# Patient Record
Sex: FEMALE | Race: WHITE | NOT HISPANIC OR LATINO | Employment: FULL TIME | URBAN - METROPOLITAN AREA
[De-identification: names, ages, dates, MRNs, and addresses within clinical notes are randomized per-mention and may not be internally consistent; named-entity substitution may affect disease eponyms.]

---

## 2017-01-26 LAB
C TRACH DNA SPEC QL PROBE+SIG AMP: NORMAL
N GONORRHOEA DNA SPEC QL PROBE+SIG AMP: NORMAL
SPECIMEN DESCRIP: NORMAL
SPECIMEN DESCRIPTION: NORMAL

## 2017-04-13 ENCOUNTER — TRANSFERRED RECORDS (OUTPATIENT)
Dept: HEALTH INFORMATION MANAGEMENT | Facility: CLINIC | Age: 22
End: 2017-04-13

## 2017-04-13 LAB — PAP SMEAR - HIM PATIENT REPORTED: NORMAL

## 2018-09-12 ENCOUNTER — OFFICE VISIT (OUTPATIENT)
Dept: FAMILY MEDICINE | Facility: CLINIC | Age: 23
End: 2018-09-12
Payer: COMMERCIAL

## 2018-09-12 VITALS
WEIGHT: 183 LBS | SYSTOLIC BLOOD PRESSURE: 112 MMHG | BODY MASS INDEX: 30.49 KG/M2 | HEIGHT: 65 IN | TEMPERATURE: 98.4 F | HEART RATE: 72 BPM | OXYGEN SATURATION: 99 % | DIASTOLIC BLOOD PRESSURE: 79 MMHG

## 2018-09-12 DIAGNOSIS — Z30.41 ENCOUNTER FOR SURVEILLANCE OF CONTRACEPTIVE PILLS: Primary | ICD-10-CM

## 2018-09-12 PROCEDURE — 99203 OFFICE O/P NEW LOW 30 MIN: CPT | Performed by: PHYSICIAN ASSISTANT

## 2018-09-12 RX ORDER — NORGESTIMATE AND ETHINYL ESTRADIOL 0.25-0.035
KIT ORAL
COMMUNITY
Start: 2018-01-10 | End: 2018-09-12

## 2018-09-12 RX ORDER — NORGESTIMATE AND ETHINYL ESTRADIOL 0.25-0.035
1 KIT ORAL DAILY
Qty: 84 TABLET | Refills: 1 | Status: SHIPPED | OUTPATIENT
Start: 2018-09-12 | End: 2018-12-11

## 2018-09-12 NOTE — PROGRESS NOTES
"  SUBJECTIVE:                                                    Kendra Clark is a 22 year old female who presents to clinic today for the following health issues:      Medication Followup of Birth Control    Taking Medication as prescribed: yes    Side Effects:  None    Medication Helping Symptoms:  yes    Patient is here for a renewal of her birth control pill.  She states that she takes sprintec and tolerates it well.  She does not have any concerns today regarding the sprintec.     Patient denies any headaches or migraines with auras.  Patient is also a non-smoker.      Problem list and histories reviewed & adjusted, as indicated.  Additional history: as documented      ROS:  Constitutional, HEENT, cardiovascular, pulmonary, GI, , musculoskeletal, neuro, skin, endocrine and psych systems are negative, except as otherwise noted.    OBJECTIVE:                                                    /79 (BP Location: Left arm, Patient Position: Chair, Cuff Size: Adult Large)  Pulse 72  Temp 98.4  F (36.9  C) (Oral)  Ht 5' 4.9\" (1.648 m)  Wt 183 lb (83 kg)  LMP 08/27/2018 (Exact Date)  SpO2 99%  Breastfeeding? No  BMI 30.55 kg/m2  Body mass index is 30.55 kg/(m^2).  GENERAL: healthy, alert and no distress  EYES: Eyes grossly normal to inspection, PERRL and conjunctivae and sclerae normal  RESP: lungs clear to auscultation - no rales, rhonchi or wheezes  BREAST: normal without masses, tenderness or nipple discharge and no palpable axillary masses or adenopathy  CV: regular rate and rhythm, normal S1 S2, no S3 or S4, no murmur, click or rub, no peripheral edema and peripheral pulses strong  MS: no gross musculoskeletal defects noted, no edema  NEURO: Normal strength and tone, mentation intact and speech normal  PSYCH: mentation appears normal, affect normal/bright    Diagnostic Test Results:  none      ASSESSMENT/PLAN:                                                      Kendra was seen today for " "establish care and recheck medication.    Diagnoses and all orders for this visit:    Encounter for surveillance of contraceptive pills  -     norgestimate-ethinyl estradiol (ORTHO-CYCLEN, SPRINTEC) 0.25-35 MG-MCG per tablet; Take 1 tablet by mouth daily    - Patient has had a PAP done at an outside facility.  She reports that it was positive for \"some kind of HPV.\"  She states that on followup PAP, the cells were abnormal, but HPV clear, and therefore she was advised of 3 year followup.   - Patient has been advised to have records faxed to us for review.   - Patient advised to followup in about 3 months for a recheck, and a PAP if needed based on review of medical chart.    - Patient to be seen sooner if needed.     Followup: Return in about 3 months (around 12/12/2018) for Re-check of symptoms, please be seen sooner if needed.    -- I have discussed the patient's diagnosis, and my plan of treatment with the patient and/or family. Patient is aware to followup if symptoms do not improve.  Patient has been advised to be seen sooner or seek more immediate care if symptoms change or worsen.  Patient agrees with and understands the plan today.     See Patient Instructions        Rosalie Chambers PA-C    [facility] [place]    "

## 2018-09-12 NOTE — MR AVS SNAPSHOT
"              After Visit Summary   2018    Kendra Clark    MRN: [id number]           Patient Information     Date Of Birth          1995        Visit Information        Provider Department      2018 9:20 AM Rosalie Chambers PA-C [facility]     Encounter for surveillance of contraceptive pills    -  1       Follow-ups after your visit        Follow-up notes from your care team     Return in about 3 months (around 2018) for Re-check of symptoms, please be seen sooner if needed.      Who to contact     If you have questions or need follow up information about today's clinic visit or your schedule please contact [facility] directly at [phone].  Normal or non-critical lab and imaging results will be communicated to you by MyChart, letter or phone within 4 business days after the clinic has received the results. If you do not hear from us within 7 days, please contact the clinic through PeopleJamhart or phone. If you have a critical or abnormal lab result, we will notify you by phone as soon as possible.  Submit refill requests through TRUSTe or call your pharmacy and they will forward the refill request to us. Please allow 3 business days for your refill to be completed.          Additional Information About Your Visit        MyChart Information     TRUSTe lets you send messages to your doctor, view your test results, renew your prescriptions, schedule appointments and more. To sign up, go to [email or web address] . Click on \"Log in\" on the left side of the screen, which will take you to the Welcome page. Then click on \"Sign up Now\" on the right side of the page.     You will be asked to enter the access code listed below, as well as some personal information. Please follow the directions to create your username and password.     Your access code is: [email or web address]  Expires: 2018 10:04 AM     Your access code will  in 90 " "days. If you need help or a new code, please call your [facility] clinic or [phone].        Care EveryWhere ID     This is your Care EveryWhere ID. This could be used by other organizations to access your [facility] medical records  [id number]        Your Vitals Were     Pulse Temperature Height Last Period Pulse Oximetry Breastfeeding?    72 98.4  F (36.9  C) (Oral) 5' 4.9\" (1.648 m) 08/27/2018 (Exact Date) 99% No    BMI (Body Mass Index)                   30.55 kg/m2            Blood Pressure from Last 3 Encounters:   09/12/18 112/79    Weight from Last 3 Encounters:   09/12/18 183 lb (83 kg)              Today, you had the following     No orders found for display         Today's Medication Changes          These changes are accurate as of 9/12/18 10:04 AM.  If you have any questions, ask your nurse or doctor.               These medicines have changed or have updated prescriptions.        Dose/Directions    norgestimate-ethinyl estradiol 0.25-35 MG-MCG per tablet   Commonly known as:  ORTHO-CYCLEN, SPRINTEC   This may have changed:    - how much to take  - when to take this   Used for:  Encounter for surveillance of contraceptive pills   Changed by:  Rosalie Chambers PA-C        Dose:  1 tablet   Take 1 tablet by mouth daily   Quantity:  84 tablet   Refills:  1            Where to get your medicines      These medications were sent to [facility]  MILENA BURKS  [address] [facility]  [address] [place] [address]     Phone:  [phone]     norgestimate-ethinyl estradiol 0.25-35 MG-MCG per tablet                Primary Care Provider Office Phone # Fax #    Rosalie Chambers PA-C [phone]       [address] [place] [address]        Equal Access to Services     [facility] BOBY AH: Irma malhotra Sokalyn, waaxda luqadaha, qaybta kaalmadewey frank, erin abbott. [facility] [phone].    ATENCIÓN: " Si habla gregg, tiene a serra disposición servicios gratuitos de asistencia lingüística. Josefina peña [phone].    We comply with applicable federal civil rights laws and [place] laws. We do not discriminate on the basis of race, color, national origin, age, disability, sex, sexual orientation, or gender identity.            Thank you!     Thank you for choosing [facility]  for your care. Our goal is always to provide you with excellent care. Hearing back from our patients is one way we can continue to improve our services. Please take a few minutes to complete the written survey that you may receive in the mail after your visit with us. Thank you!             Your Updated Medication List - Protect others around you: Learn how to safely use, store and throw away your medicines at www.disposemymeds.org.          This list is accurate as of 9/12/18 10:04 AM.  Always use your most recent med list.                   Brand Name Dispense Instructions for use Diagnosis    norgestimate-ethinyl estradiol 0.25-35 MG-MCG per tablet    ORTHO-CYCLEN, SPRINTEC    84 tablet    Take 1 tablet by mouth daily    Encounter for surveillance of contraceptive pills

## 2018-09-26 ENCOUNTER — HEALTH MAINTENANCE LETTER (OUTPATIENT)
Age: 23
End: 2018-09-26

## 2018-12-11 ENCOUNTER — OFFICE VISIT (OUTPATIENT)
Dept: FAMILY MEDICINE | Facility: CLINIC | Age: 23
End: 2018-12-11
Payer: COMMERCIAL

## 2018-12-11 VITALS
OXYGEN SATURATION: 97 % | HEART RATE: 110 BPM | SYSTOLIC BLOOD PRESSURE: 122 MMHG | WEIGHT: 186 LBS | TEMPERATURE: 99 F | DIASTOLIC BLOOD PRESSURE: 70 MMHG | BODY MASS INDEX: 30.99 KG/M2 | HEIGHT: 65 IN

## 2018-12-11 DIAGNOSIS — Z11.3 SCREENING EXAMINATION FOR VENEREAL DISEASE: ICD-10-CM

## 2018-12-11 DIAGNOSIS — Z30.41 ENCOUNTER FOR SURVEILLANCE OF CONTRACEPTIVE PILLS: Primary | ICD-10-CM

## 2018-12-11 DIAGNOSIS — Z11.51 SCREENING FOR HUMAN PAPILLOMAVIRUS: ICD-10-CM

## 2018-12-11 DIAGNOSIS — D22.9 MULTIPLE PIGMENTED NEVI: ICD-10-CM

## 2018-12-11 PROCEDURE — 99214 OFFICE O/P EST MOD 30 MIN: CPT | Performed by: PHYSICIAN ASSISTANT

## 2018-12-11 PROCEDURE — 87491 CHLMYD TRACH DNA AMP PROBE: CPT | Performed by: PHYSICIAN ASSISTANT

## 2018-12-11 PROCEDURE — 87591 N.GONORRHOEAE DNA AMP PROB: CPT | Performed by: PHYSICIAN ASSISTANT

## 2018-12-11 PROCEDURE — G0145 SCR C/V CYTO,THINLAYER,RESCR: HCPCS | Performed by: PHYSICIAN ASSISTANT

## 2018-12-11 RX ORDER — NORGESTIMATE AND ETHINYL ESTRADIOL 0.25-0.035
1 KIT ORAL DAILY
Qty: 84 TABLET | Refills: 2 | Status: SHIPPED | OUTPATIENT
Start: 2018-12-11 | End: 2019-08-25

## 2018-12-11 ASSESSMENT — ANXIETY QUESTIONNAIRES
5. BEING SO RESTLESS THAT IT IS HARD TO SIT STILL: NOT AT ALL
GAD7 TOTAL SCORE: 0
2. NOT BEING ABLE TO STOP OR CONTROL WORRYING: NOT AT ALL
6. BECOMING EASILY ANNOYED OR IRRITABLE: NOT AT ALL
3. WORRYING TOO MUCH ABOUT DIFFERENT THINGS: NOT AT ALL
1. FEELING NERVOUS, ANXIOUS, OR ON EDGE: NOT AT ALL
7. FEELING AFRAID AS IF SOMETHING AWFUL MIGHT HAPPEN: NOT AT ALL

## 2018-12-11 ASSESSMENT — MIFFLIN-ST. JEOR: SCORE: 1597.98

## 2018-12-11 ASSESSMENT — PATIENT HEALTH QUESTIONNAIRE - PHQ9
5. POOR APPETITE OR OVEREATING: NOT AT ALL
SUM OF ALL RESPONSES TO PHQ QUESTIONS 1-9: 4

## 2018-12-11 NOTE — PROGRESS NOTES
"  SUBJECTIVE:                                                    Kendra Clark is a 22 year old female who presents to clinic today for the following health issues:      Last pap 01/2016 - did not have pap done at physical done at outside clinic.   Ok with swabbing for G and C -     Patient is here today for a PAP, as she did not have one done at her prior clinic as she thought she did.    Patient does not have concerns today.  She needs a refill of the birth control.  She states that she is doing well on the birth control and denies any concerns or side effects/symptoms.     Also, she has a mole on her left shoulder that has become more itchy and it is raised.  She wants this evaluated at dermatology.  Patient is looking today for a referral.       Problem list and histories reviewed & adjusted, as indicated.  Additional history: as documented      ROS:  Constitutional, HEENT, cardiovascular, pulmonary, GI, , musculoskeletal, neuro, skin, endocrine and psych systems are negative, except as otherwise noted.    OBJECTIVE:                                                    /70 (BP Location: Right arm, Patient Position: Chair, Cuff Size: Adult Large)   Pulse 110   Temp 99  F (37.2  C) (Oral)   Ht 1.648 m (5' 4.9\")   Wt 84.4 kg (186 lb)   LMP 11/19/2018 (Exact Date)   SpO2 97%   Breastfeeding? No   BMI 31.05 kg/m    Body mass index is 31.05 kg/m .  GENERAL: healthy, alert and no distress  EYES: Eyes grossly normal to inspection, PERRL and conjunctivae and sclerae normal   (female): normal female external genitalia, normal urethral meatus, vaginal mucosa, normal cervix/adnexa/uterus without masses or discharge  MS: no gross musculoskeletal defects noted, no edema  NEURO: Normal strength and tone, mentation intact and speech normal  PSYCH: mentation appears normal, affect normal/bright  SKIN:  Multiple pigment nevi on arms bilaterally.      Diagnostic Test Results:  G/C - pending     ASSESSMENT/PLAN:   "                                                    Kendra was seen today for repeat pap smear.    Diagnoses and all orders for this visit:    Encounter for surveillance of contraceptive pills  -     norgestimate-ethinyl estradiol (ORTHO-CYCLEN/SPRINTEC) 0.25-35 MG-MCG tablet; Take 1 tablet by mouth daily    Screening for human papillomavirus  -     Pap imaged thin layer screen only - recommended age 21 - 24 years    Screening examination for venereal disease  -     Neisseria gonorrhoeae PCR  -     Chlamydia trachomatis PCR    Multiple pigmented nevi  -     SKIN CARE REFERRAL      Followup: Return in about 1 year (around 12/11/2019) for Physical Exam, Routine Visit, please be seen sooner if needed.     -- I have discussed the patient's diagnosis, and my plan of treatment with the patient and/or family. Patient is aware to followup if symptoms do not improve.  Patient has been advised to be seen sooner or seek more immediate care if symptoms change or worsen.  Patient agrees with and understands the plan today.     See Patient Instructions        Rosalie Chambers PA-C    [facility] [place]

## 2018-12-11 NOTE — LETTER
[facility]  [address] [place] [address], [place] [address]                  [phone]   December 13, 2018    Kendra Clark  [address] [place] [address] [place] [address]      Dear Kendra,    Here is a summary of your recent test results:    The results from your recent lab work are within normal limits.     -Chlamydia and gonnohrea tests are normal.       Thank you for choosing [facility] for your health care needs,     Your test results are enclosed.      Please contact me if you have any questions.    In addition, here is a list of due or overdue Health Maintenance reminders.    Health Maintenance Due   Topic Date Due     HIV SCREEN (SYSTEM ASSIGNED)  12/13/2012     Chlamydia Screening Lab - yearly  01/26/2018       Please call us at [phone] (or use Tok3n) to address the above recommendations.            Thank you very much for trusting [facility]..     Healthy regards,      Rosalie Chambers PA-C        Results for orders placed or performed in visit on 12/11/18   Neisseria gonorrhoeae PCR   Result Value Ref Range    Specimen Descrip Vagina     N Gonorrhea PCR Negative NEG^Negative   Chlamydia trachomatis PCR   Result Value Ref Range    Specimen Description Vagina     Chlamydia Trachomatis PCR Negative NEG^Negative

## 2018-12-11 NOTE — LETTER
December 19, 2018      Kendra Clark  [address] [facility] [address]    Dear ,      I am happy to inform you that your recent cervical cancer screening test (PAP smear) was normal.      Preventative screenings such as this help to ensure your health for years to come. You should repeat a pap smear in 3 years, unless otherwise directed.      You will still need to return to the clinic every year for your annual exam and other preventive tests.     If you have additional questions regarding this result, please call our registered nurse, Lori at [phone].      Sincerely,      Rosalie Chambers PA-C/ulysses

## 2018-12-12 LAB
C TRACH DNA SPEC QL NAA+PROBE: NEGATIVE
N GONORRHOEA DNA SPEC QL NAA+PROBE: NEGATIVE
SPECIMEN SOURCE: NORMAL
SPECIMEN SOURCE: NORMAL

## 2018-12-12 ASSESSMENT — ASTHMA QUESTIONNAIRES: ACT_TOTALSCORE: 21

## 2018-12-12 ASSESSMENT — ANXIETY QUESTIONNAIRES: GAD7 TOTAL SCORE: 0

## 2018-12-13 LAB
COPATH REPORT: NORMAL
PAP: NORMAL

## 2018-12-13 NOTE — RESULT ENCOUNTER NOTE
Note to staff: Please send a result letter    The results from your recent lab work are within normal limits.    -Chlamydia and gonnohrea tests are normal.      Thank you for choosing [facility] for your health care needs,      Rosalie Chambers PA-C

## 2019-08-25 DIAGNOSIS — Z30.41 ENCOUNTER FOR SURVEILLANCE OF CONTRACEPTIVE PILLS: ICD-10-CM

## 2019-08-26 NOTE — TELEPHONE ENCOUNTER
"Requested Prescriptions   Pending Prescriptions Disp Refills     ESTARYLLA 0.25-35 MG-MCG tablet [Pharmacy Med Name: ESTARYLLA TABLETS 28S]              Last Written Prescription Date:  12.11.18  Last Fill Quantity: 84 tablet,  # refills: 2   Last office visit: 12/11/2018 with prescribing provider:     Future Office Visit:               84 tablet 0     Sig: TAKE 1 TABLET BY MOUTH DAILY       Contraceptives Protocol Passed - 8/25/2019  2:00 PM        Passed - Patient is not a current smoker if age is 35 or older        Passed - Recent (12 mo) or future (30 days) visit within the authorizing provider's specialty     Patient had office visit in the last 12 months or has a visit in the next 30 days with authorizing provider or within the authorizing provider's specialty.  See \"Patient Info\" tab in inbasket, or \"Choose Columns\" in Meds & Orders section of the refill encounter.              Passed - Medication is active on med list        Passed - No active pregnancy on record        Passed - No positive pregnancy test in past 12 months        "

## 2019-08-27 RX ORDER — NORGESTIMATE AND ETHINYL ESTRADIOL 0.25-0.035
KIT ORAL
Qty: 84 TABLET | Refills: 0 | Status: SHIPPED | OUTPATIENT
Start: 2019-08-27 | End: 2019-11-15

## 2019-08-27 NOTE — TELEPHONE ENCOUNTER
Prescription approved per [facility] Refill Protocol.  GASTON GrissomN, RN  [facility]

## 2019-11-15 DIAGNOSIS — Z30.41 ENCOUNTER FOR SURVEILLANCE OF CONTRACEPTIVE PILLS: ICD-10-CM

## 2019-11-15 NOTE — TELEPHONE ENCOUNTER
"Requested Prescriptions   Pending Prescriptions Disp Refills     ESTARYLLA 0.25-35 MG-MCG tablet [Pharmacy Med Name: ESTARYLLA TABLETS 28S]          Last Written Prescription Date:  8.27.19  Last Fill Quantity: 84 tablet,  # refills: 0   Last office visit: 12/11/2018 with prescribing provider:  Navdeep Shahid MD             Future Office Visit:   Next 5 appointments (look out 90 days)    Nov 18, 2019  6:10 PM CST  PHYSICAL with ADALID Prado CNP  [facility] [address] [place] [id number] [phone]            84 tablet 0     Sig: TAKE 1 TABLET BY MOUTH DAILY       Contraceptives Protocol Passed - 11/15/2019  3:05 PM        Passed - Patient is not a current smoker if age is 35 or older        Passed - Recent (12 mo) or future (30 days) visit within the authorizing provider's specialty     Patient has had an office visit with the authorizing provider or a provider within the authorizing providers department within the previous 12 mos or has a future within next 30 days. See \"Patient Info\" tab in inbasket, or \"Choose Columns\" in Meds & Orders section of the refill encounter.              Passed - Medication is active on med list        Passed - No active pregnancy on record        Passed - No positive pregnancy test in past 12 months        "

## 2019-11-18 ENCOUNTER — OFFICE VISIT (OUTPATIENT)
Dept: FAMILY MEDICINE | Facility: CLINIC | Age: 24
End: 2019-11-18
Payer: COMMERCIAL

## 2019-11-18 VITALS
WEIGHT: 203 LBS | TEMPERATURE: 98.3 F | OXYGEN SATURATION: 97 % | BODY MASS INDEX: 33.82 KG/M2 | DIASTOLIC BLOOD PRESSURE: 74 MMHG | HEIGHT: 65 IN | SYSTOLIC BLOOD PRESSURE: 126 MMHG | HEART RATE: 106 BPM

## 2019-11-18 DIAGNOSIS — Z30.41 ENCOUNTER FOR SURVEILLANCE OF CONTRACEPTIVE PILLS: ICD-10-CM

## 2019-11-18 DIAGNOSIS — R53.83 FATIGUE, UNSPECIFIED TYPE: ICD-10-CM

## 2019-11-18 DIAGNOSIS — Z00.00 ROUTINE GENERAL MEDICAL EXAMINATION AT A HEALTH CARE FACILITY: Primary | ICD-10-CM

## 2019-11-18 DIAGNOSIS — Z13.220 SCREENING FOR HYPERLIPIDEMIA: ICD-10-CM

## 2019-11-18 PROCEDURE — 99213 OFFICE O/P EST LOW 20 MIN: CPT | Mod: 25 | Performed by: NURSE PRACTITIONER

## 2019-11-18 PROCEDURE — 99395 PREV VISIT EST AGE 18-39: CPT | Performed by: NURSE PRACTITIONER

## 2019-11-18 RX ORDER — NORGESTIMATE AND ETHINYL ESTRADIOL 0.25-0.035
1 KIT ORAL DAILY
Qty: 84 TABLET | Refills: 3 | Status: SHIPPED | OUTPATIENT
Start: 2019-11-18 | End: 2020-12-03

## 2019-11-18 RX ORDER — NORGESTIMATE AND ETHINYL ESTRADIOL 0.25-0.035
KIT ORAL
Qty: 84 TABLET | Refills: 0 | Status: SHIPPED | OUTPATIENT
Start: 2019-11-18 | End: 2019-11-18

## 2019-11-18 ASSESSMENT — MIFFLIN-ST. JEOR: SCORE: 1670.09

## 2019-11-19 NOTE — PROGRESS NOTES
SUBJECTIVE:   CC: Kendra Clark is an 23 year old woman who presents for preventive health visit.     Healthy Habits:    Do you get at least three servings of calcium containing foods daily (dairy, green leafy vegetables, etc.)? 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 no    Do you see a dentist twice per year? yes    Do you have sleep apnea, excessive snoring or daytime drowsiness?no    Reports fatigue and difficulty losing weight despite extensive exercise.     Today's PHQ-2 Score: 0-0  PHQ-2 ( 1999 [employer]) 11/18/2019 12/11/2018   Q1: Little interest or pleasure in doing things 0 1   Q2: Feeling down, depressed or hopeless 0 0   PHQ-2 Score 0 1       Abuse: Current or Past(Physical, Sexual or Emotional)- No  Do you feel safe in your environment? Yes      Social History     Tobacco Use     Smoking status: Never Smoker     Smokeless tobacco: Never Used   Substance Use Topics     Alcohol use: Yes     Comment: 0-1 drink every 3-4 months     If you drink alcohol do you typically have >3 drinks per day or >7 drinks per week? No                     Reviewed orders with patient.  Reviewed health maintenance and updated orders accordingly - Yes  Lab work is in process  Labs reviewed in EPIC  BP Readings from Last 3 Encounters:   11/18/19 126/74   12/11/18 122/70   09/12/18 112/79    Wt Readings from Last 3 Encounters:   11/18/19 92.1 kg (203 lb)   12/11/18 84.4 kg (186 lb)   09/12/18 83 kg (183 lb)             There is no problem list on file for this patient.    Past Surgical History:   Procedure  "Laterality Date     TONSILLECTOMY & ADENOIDECTOMY Bilateral 01/01/1998       Social History     Tobacco Use     Smoking status: Never Smoker     Smokeless tobacco: Never Used   Substance Use Topics     Alcohol use: Yes     Comment: 0-1 drink every 3-4 months     Family History   Problem Relation Age of Onset     Coronary Artery Disease Early Onset Father      Hypertension Father          Current Outpatient Medications   Medication Sig Dispense Refill     norgestimate-ethinyl estradiol (ESTARYLLA) 0.25-35 MG-MCG tablet Take 1 tablet by mouth daily 84 tablet 3     Allergies   Allergen Reactions     Clindamycin Hives     Mammogram not appropriate for this patient based on age.    Pertinent mammograms are reviewed under the imaging tab.  History of abnormal Pap smear: NO - age 21-29 PAP every 3 years recommended  PAP / HPV 12/11/2018   PAP NIL     Reviewed and updated as needed this visit by clinical staff  Tobacco  Allergies  Meds  Problems  Med Hx  Surg Hx  Fam Hx  Soc Hx          Reviewed and updated as needed this visit by Provider  Tobacco  Allergies  Meds  Problems  Med Hx  Surg Hx  Fam Hx          ROS:  Constitutional, HEENT, cardiovascular, pulmonary, GI, , musculoskeletal, neuro, skin, endocrine and psych systems are negative, except as otherwise noted in the HPI.      OBJECTIVE:   /74 (BP Location: Left arm, Patient Position: Chair, Cuff Size: Adult Large)   Pulse 106   Temp 98.3  F (36.8  C) (Oral)   Ht 1.648 m (5' 4.9\")   Wt 92.1 kg (203 lb)   LMP 10/28/2019 (Exact Date)   SpO2 97%   Breastfeeding No   BMI 33.89 kg/m    EXAM:  GENERAL: healthy, alert and no distress  EYES: Eyes grossly normal to inspection, PERRL and conjunctivae and sclerae normal  HENT: ear canals and TM's normal, nose and mouth without ulcers or lesions-absent tonsils bilateral.   NECK: no adenopathy, no asymmetry, masses, or scars and thyroid normal to palpation  RESP: lungs clear to auscultation - no rales, " rhonchi or wheezes  BREAST: normal without masses, tenderness or nipple discharge and no palpable axillary masses or adenopathy  CV: regular rate and rhythm, normal S1 S2, no S3 or S4, no murmur, click or rub, no peripheral edema and peripheral pulses strong  ABDOMEN: soft, nontender, no hepatosplenomegaly, no masses and bowel sounds normal   (female):declines   MS: no gross musculoskeletal defects noted, no edema  SKIN: no suspicious lesions or rashes  NEURO: Normal strength and tone, mentation intact and speech normal  PSYCH: mentation appears normal, affect normal/bright  LYMPH: no cervical, supraclavicular, axillary, or inguinal adenopathy    Diagnostic Test Results:  Labs reviewed in Epic    ASSESSMENT/PLAN:   Kendra was seen today for physical.    Diagnoses and all orders for this visit:    Routine general medical examination at a [facility] facility  Well woman exam with breast exam completed today.  Fasting labs ordered.  Will notify of lab results.  -     Lipid Profile (Chol, Trig, HDL, LDL calc); Future  -     CBC with platelets and differential; Future  -     Comprehensive metabolic panel (BMP + Alb, Alk Phos, ALT, AST, Total. Bili, TP); Future  -     TSH with free T4 reflex; Future    Fatigue, unspecified type  Will complete labs and notify patient of results.  Continue to monitor.   -     CBC with platelets and differential; Future  -     TSH with free T4 reflex; Future  -     Vitamin D Deficiency; Future  -     OFFICE/OUTPT VISIT,JACQUELINE CASTELLANO III    Encounter for surveillance of contraceptive pills  Stable on current birth control without side effect.   Continue same dose refilled for a year.  -     norgestimate-ethinyl estradiol (ESTARYLLA) 0.25-35 MG-MCG tablet; Take 1 tablet by mouth daily    Screening for hyperlipidemia  Fasting labs pending.  -     Lipid Profile (Chol, Trig, HDL, LDL calc); Future    COUNSELING:   Reviewed preventive health counseling, as reflected in patient instructions        "Regular exercise       Healthy diet/nutrition    Estimated body mass index is 33.89 kg/m  as calculated from the following:    Height as of this encounter: 1.648 m (5' 4.9\").    Weight as of this encounter: 92.1 kg (203 lb).    Weight management plan: Discussed healthy diet and exercise guidelines     reports that she has never smoked. She has never used smokeless tobacco.      Counseling Resources:  ATP IV Guidelines  Pooled Cohorts Equation Calculator  Breast Cancer Risk Calculator  FRAX Risk Assessment  ICSI Preventive Guidelines  Dietary Guidelines for Americans, 2010  USDA's MyPlate  ASA Prophylaxis  Lung CA Screening    ADALID Moreira [facility]  "

## 2019-12-01 ENCOUNTER — OFFICE VISIT (OUTPATIENT)
Dept: URGENT CARE | Facility: URGENT CARE | Age: 24
End: 2019-12-01
Payer: COMMERCIAL

## 2019-12-01 VITALS
BODY MASS INDEX: 34.42 KG/M2 | OXYGEN SATURATION: 100 % | SYSTOLIC BLOOD PRESSURE: 110 MMHG | WEIGHT: 206.2 LBS | TEMPERATURE: 99.1 F | DIASTOLIC BLOOD PRESSURE: 62 MMHG | HEART RATE: 103 BPM

## 2019-12-01 DIAGNOSIS — I88.9 LYMPHADENITIS: ICD-10-CM

## 2019-12-01 DIAGNOSIS — R50.9 FEVER AND CHILLS: ICD-10-CM

## 2019-12-01 DIAGNOSIS — R05.9 COUGH: ICD-10-CM

## 2019-12-01 DIAGNOSIS — R07.0 THROAT PAIN: Primary | ICD-10-CM

## 2019-12-01 LAB
DEPRECATED S PYO AG THROAT QL EIA: NORMAL
FLUAV+FLUBV AG SPEC QL: NEGATIVE
FLUAV+FLUBV AG SPEC QL: NEGATIVE
SPECIMEN SOURCE: NORMAL
SPECIMEN SOURCE: NORMAL

## 2019-12-01 PROCEDURE — 87081 CULTURE SCREEN ONLY: CPT | Performed by: PHYSICIAN ASSISTANT

## 2019-12-01 PROCEDURE — 87880 STREP A ASSAY W/OPTIC: CPT | Performed by: PHYSICIAN ASSISTANT

## 2019-12-01 PROCEDURE — 99214 OFFICE O/P EST MOD 30 MIN: CPT | Performed by: PHYSICIAN ASSISTANT

## 2019-12-01 PROCEDURE — 87804 INFLUENZA ASSAY W/OPTIC: CPT | Performed by: PHYSICIAN ASSISTANT

## 2019-12-01 RX ORDER — AMOXICILLIN 875 MG
875 TABLET ORAL 2 TIMES DAILY
Qty: 20 TABLET | Refills: 0 | Status: SHIPPED | OUTPATIENT
Start: 2019-12-01 | End: 2020-02-18

## 2019-12-01 RX ORDER — IBUPROFEN 600 MG/1
600 TABLET, FILM COATED ORAL EVERY 6 HOURS PRN
Qty: 30 TABLET | Refills: 0 | Status: SHIPPED | OUTPATIENT
Start: 2019-12-01 | End: 2020-02-18

## 2019-12-01 NOTE — PROGRESS NOTES
SUBJECTIVE:  Kendra Clark is a 23 year old female with a chief complaint of sore throat.  Onset of symptoms was 6 day(s) ago.    Course of illness: still present.  Severity moderate  Current and Associated symptoms: sore throat and swollen glands  Treatment measures tried include otc.  Predisposing factors include none.    PMH  Allergies  Obesity    Allergies   Allergen Reactions     Clindamycin Hives     Social History     Tobacco Use     Smoking status: Never Smoker     Smokeless tobacco: Never Used   Substance Use Topics     Alcohol use: Yes     Comment: 0-1 drink every 3-4 months     Family History   Problem Relation Age of Onset     Coronary Artery Disease Early Onset Father      Hypertension Father        ROS:  CONSTITUTIONAL:POSITIVE  for chills and fever   INTEGUMENTARY/SKIN: NEGATIVE for worrisome rashes, moles or lesions  EYES: NEGATIVE for vision changes or irritation  ENT/MOUTH: POSITIVE for throat pain and throat swelling  RESP:NEGATIVE for significant cough or SOB  CV: NEGATIVE for chest pain, palpitations or peripheral edema  GI: NEGATIVE for nausea, abdominal pain, heartburn, or change in bowel habits  : negative for and dysuria  MUSCULOSKELETAL: NEGATIVE for significant arthralgias or myalgia  NEURO: NEGATIVE for weakness, dizziness or paresthesias    OBJECTIVE:   /62   Pulse 103   Temp 99.1  F (37.3  C) (Tympanic)   Wt 206 lb 3.2 oz (93.5 kg)   SpO2 100%   BMI 34.42 kg/m    GENERAL APPEARANCE: healthy, alert and no distress  EYES: EOMI,  PERRL, conjunctiva clear  HENT: TM's normal bilaterally, tonsillar hypertrophy and tonsillar erythema  NECK: cervical adenopathy anterior  RESP: lungs clear to auscultation - no rales, rhonchi or wheezes  CV: regular rates and rhythm, normal S1 S2, no murmur noted  ABDOMEN:  soft, nontender, no HSM or masses and bowel sounds normal  SKIN: no suspicious lesions or rashes    Rapid Strep test is negative; await throat culture  results.    ASSESSMENT/PLAN      ICD-10-CM    1. Throat pain R07.0 Influenza A/B antigen     Rapid strep screen     Beta strep group A culture     ibuprofen (ADVIL/MOTRIN) 600 MG tablet     magic mouthwash (ENTER INGREDIENTS IN COMMENTS) suspension   2. Fever and chills R50.9 Influenza A/B antigen     Rapid strep screen     ibuprofen (ADVIL/MOTRIN) 600 MG tablet   3. Cough R05 Influenza A/B antigen   4. Lymphadenitis I88.9 amoxicillin (AMOXIL) 875 MG tablet        Symptomatic treat with gargles, lozenges, and OTC analgesic as needed. Follow-up with primary clinic if not improving.  Orders Placed This Encounter     ibuprofen (ADVIL/MOTRIN) 600 MG tablet     amoxicillin (AMOXIL) 875 MG tablet     magic mouthwash (ENTER INGREDIENTS IN COMMENTS) suspension     Strep culture pending  Advisement given that patient will be contagious for the next 24-48 hours after antibiotics initiated

## 2019-12-02 LAB
BACTERIA SPEC CULT: NORMAL
SPECIMEN SOURCE: NORMAL

## 2019-12-07 DIAGNOSIS — Z13.220 SCREENING FOR HYPERLIPIDEMIA: ICD-10-CM

## 2019-12-07 DIAGNOSIS — R53.83 FATIGUE, UNSPECIFIED TYPE: ICD-10-CM

## 2019-12-07 DIAGNOSIS — Z00.00 ROUTINE GENERAL MEDICAL EXAMINATION AT A HEALTH CARE FACILITY: ICD-10-CM

## 2019-12-07 LAB
ALBUMIN SERPL-MCNC: 3 G/DL (ref 3.4–5)
ALP SERPL-CCNC: 99 U/L (ref 40–150)
ALT SERPL W P-5'-P-CCNC: 30 U/L (ref 0–50)
ANION GAP SERPL CALCULATED.3IONS-SCNC: 5 MMOL/L (ref 3–14)
AST SERPL W P-5'-P-CCNC: 11 U/L (ref 0–45)
BASOPHILS # BLD AUTO: 0 10E9/L (ref 0–0.2)
BASOPHILS NFR BLD AUTO: 0.3 %
BILIRUB SERPL-MCNC: 0.5 MG/DL (ref 0.2–1.3)
BUN SERPL-MCNC: 11 MG/DL (ref 7–30)
CALCIUM SERPL-MCNC: 8.6 MG/DL (ref 8.5–10.1)
CHLORIDE SERPL-SCNC: 106 MMOL/L (ref 94–109)
CHOLEST SERPL-MCNC: 174 MG/DL
CO2 SERPL-SCNC: 26 MMOL/L (ref 20–32)
CREAT SERPL-MCNC: 0.68 MG/DL (ref 0.52–1.04)
DIFFERENTIAL METHOD BLD: NORMAL
EOSINOPHIL # BLD AUTO: 0.2 10E9/L (ref 0–0.7)
EOSINOPHIL NFR BLD AUTO: 3.1 %
ERYTHROCYTE [DISTWIDTH] IN BLOOD BY AUTOMATED COUNT: 12.1 % (ref 10–15)
GFR SERPL CREATININE-BSD FRML MDRD: >90 ML/MIN/{1.73_M2}
GLUCOSE SERPL-MCNC: 87 MG/DL (ref 70–99)
HCT VFR BLD AUTO: 41.7 % (ref 35–47)
HDLC SERPL-MCNC: 64 MG/DL
HGB BLD-MCNC: 13.8 G/DL (ref 11.7–15.7)
LDLC SERPL CALC-MCNC: 91 MG/DL
LYMPHOCYTES # BLD AUTO: 2.5 10E9/L (ref 0.8–5.3)
LYMPHOCYTES NFR BLD AUTO: 43.3 %
MCH RBC QN AUTO: 28.6 PG (ref 26.5–33)
MCHC RBC AUTO-ENTMCNC: 33.1 G/DL (ref 31.5–36.5)
MCV RBC AUTO: 87 FL (ref 78–100)
MONOCYTES # BLD AUTO: 0.5 10E9/L (ref 0–1.3)
MONOCYTES NFR BLD AUTO: 8.6 %
NEUTROPHILS # BLD AUTO: 2.6 10E9/L (ref 1.6–8.3)
NEUTROPHILS NFR BLD AUTO: 44.7 %
NONHDLC SERPL-MCNC: 110 MG/DL
PLATELET # BLD AUTO: 354 10E9/L (ref 150–450)
POTASSIUM SERPL-SCNC: 3.8 MMOL/L (ref 3.4–5.3)
PROT SERPL-MCNC: 6.8 G/DL (ref 6.8–8.8)
RBC # BLD AUTO: 4.82 10E12/L (ref 3.8–5.2)
SODIUM SERPL-SCNC: 137 MMOL/L (ref 133–144)
TRIGL SERPL-MCNC: 93 MG/DL
TSH SERPL DL<=0.005 MIU/L-ACNC: 2.25 MU/L (ref 0.4–4)
WBC # BLD AUTO: 5.8 10E9/L (ref 4–11)

## 2019-12-07 PROCEDURE — 82306 VITAMIN D 25 HYDROXY: CPT | Performed by: NURSE PRACTITIONER

## 2019-12-07 PROCEDURE — 80061 LIPID PANEL: CPT | Performed by: NURSE PRACTITIONER

## 2019-12-07 PROCEDURE — 36415 COLL VENOUS BLD VENIPUNCTURE: CPT | Performed by: NURSE PRACTITIONER

## 2019-12-07 PROCEDURE — 80050 GENERAL HEALTH PANEL: CPT | Performed by: NURSE PRACTITIONER

## 2019-12-08 LAB — DEPRECATED CALCIDIOL+CALCIFEROL SERPL-MC: 33 UG/L (ref 20–75)

## 2019-12-08 NOTE — RESULT ENCOUNTER NOTE
Dear Kendra,    Here is a summary of your recent test results:    -Normal red blood cell (hgb) levels, normal white blood cell count and normal platelet levels.  -Cholesterol levels (LDL,HDL, Triglycerides) are normal.  ADVISE: rechecking in 1 year.   -Liver and gallbladder tests are normal (ALT,AST, Alk phos, bilirubin), kidney function is normal (Cr, GFR), sodium is normal, potassium is normal, calcium is normal, glucose is normal.  -TSH (thyroid stimulating hormone) level is normal which indicates normal thyroid function.  -Vitamin D level is normal and getting 1000 IU daily in your diet or supplements is recommended.     For additional lab test information, labtestsonline.org is an excellent reference.    In addition, here is a list of due or overdue Health Maintenance reminders:    HPV Vaccine(3 - Female 3-dose series) due on 09/12/2015    Please call us at [phone] (or use [facility]) to address the above recommendations if needed.    Thank you for choosing  [facility] [place].  It was an honor and a privilege to participate in your care.       Healthy regards,    Michaelle Arita, TALON  [facility]

## 2020-02-18 ENCOUNTER — OFFICE VISIT (OUTPATIENT)
Dept: FAMILY MEDICINE | Facility: CLINIC | Age: 25
End: 2020-02-18
Payer: COMMERCIAL

## 2020-02-18 ENCOUNTER — NURSE TRIAGE (OUTPATIENT)
Dept: FAMILY MEDICINE | Facility: CLINIC | Age: 25
End: 2020-02-18

## 2020-02-18 VITALS
BODY MASS INDEX: 36.19 KG/M2 | WEIGHT: 212 LBS | SYSTOLIC BLOOD PRESSURE: 134 MMHG | DIASTOLIC BLOOD PRESSURE: 76 MMHG | HEART RATE: 103 BPM | OXYGEN SATURATION: 98 % | TEMPERATURE: 99 F | HEIGHT: 64 IN

## 2020-02-18 DIAGNOSIS — R03.0 ELEVATED BLOOD PRESSURE READING WITHOUT DIAGNOSIS OF HYPERTENSION: Primary | ICD-10-CM

## 2020-02-18 PROCEDURE — 99212 OFFICE O/P EST SF 10 MIN: CPT | Performed by: FAMILY MEDICINE

## 2020-02-18 ASSESSMENT — MIFFLIN-ST. JEOR: SCORE: 1691.63

## 2020-02-18 NOTE — PROGRESS NOTES
"Subjective     Kendra Clark is a 24 year old female who presents to clinic today for the following health issues:    HPI   Concern - high blood pressure  Onset: x one day    Description:   Blood pressure has been 156/94 is stressed and did one episode of dizziness    Intensity: mild    Progression of Symptoms:  worsening    Accompanying Signs & Symptoms:  See above    Previous history of similar problem:   no    Precipitating factors:   Worsened by:     Alleviating factors:  Improved by:     Therapies Tried and outcome:       There is no problem list on file for this patient.    Past Surgical History:   Procedure Laterality Date     TONSILLECTOMY & ADENOIDECTOMY Bilateral 01/01/1998       Social History     Tobacco Use     Smoking status: Never Smoker     Smokeless tobacco: Never Used   Substance Use Topics     Alcohol use: Yes     Comment: 0-1 drink every 3-4 months     Family History   Problem Relation Age of Onset     Coronary Artery Disease Early Onset Father      Hypertension Father          Current Outpatient Medications   Medication Sig Dispense Refill     norgestimate-ethinyl estradiol (ESTARYLLA) 0.25-35 MG-MCG tablet Take 1 tablet by mouth daily 84 tablet 3     Allergies   Allergen Reactions     Clindamycin Hives         Reviewed and updated as needed this visit by Provider  Allergies         Review of Systems   ROS COMP: CONSTITUTIONAL: NEGATIVE for fever, chills, change in weight  ENT/MOUTH: NEGATIVE for ear, mouth and throat problems  RESP: NEGATIVE for significant cough or SOB  CV: NEGATIVE for chest pain, palpitations or peripheral edema      Objective    /76   Pulse 103   Temp 99  F (37.2  C) (Tympanic)   Ht 1.626 m (5' 4\")   Wt 96.2 kg (212 lb)   LMP 02/03/2020   SpO2 98%   BMI 36.39 kg/m    Body mass index is 36.39 kg/m .  Physical Exam   GENERAL: healthy, alert and no distress  NECK: no adenopathy, no asymmetry, masses, or scars and thyroid normal to palpation  RESP: lungs clear " to auscultation - no rales, rhonchi or wheezes  CV: regular rate and rhythm, normal S1 S2, no S3 or S4, no murmur, click or rub, no peripheral edema and peripheral pulses strong  ABDOMEN: soft, nontender, no hepatosplenomegaly, no masses and bowel sounds normal  MS: no gross musculoskeletal defects noted, no edema            Assessment & Plan     1. Elevated blood pressure reading without diagnosis of hypertension  Normal blood pressure with elevated pulse noted today.  Patient is currently not symptomatic.  Recommending to monitor blood pressure regularly.  Increase hydration.  Eat a low-salt diet.  Sleep hygiene discussed.      Instructed to follow-up with blood pressure is noted to be high again or she has recurrence of symptoms of dizziness.  Patient verbalized understanding and agreement      Liban Givens MD  [facility]

## 2020-08-19 ENCOUNTER — E-VISIT (OUTPATIENT)
Dept: FAMILY MEDICINE | Facility: CLINIC | Age: 25
End: 2020-08-19
Payer: COMMERCIAL

## 2020-08-19 DIAGNOSIS — R10.2 VAGINAL PAIN: Primary | ICD-10-CM

## 2020-08-19 PROCEDURE — 99207 ZZC NON-BILLABLE SERV PER CHARTING: CPT | Performed by: NURSE PRACTITIONER

## 2020-08-20 DIAGNOSIS — R10.2 VAGINAL PAIN: ICD-10-CM

## 2020-08-20 LAB
ALBUMIN UR-MCNC: NEGATIVE MG/DL
APPEARANCE UR: ABNORMAL
BACTERIA #/AREA URNS HPF: ABNORMAL /HPF
BILIRUB UR QL STRIP: NEGATIVE
COLOR UR AUTO: YELLOW
GLUCOSE UR STRIP-MCNC: NEGATIVE MG/DL
HGB UR QL STRIP: ABNORMAL
KETONES UR STRIP-MCNC: NEGATIVE MG/DL
LEUKOCYTE ESTERASE UR QL STRIP: NEGATIVE
NITRATE UR QL: NEGATIVE
NON-SQ EPI CELLS #/AREA URNS LPF: ABNORMAL /LPF
PH UR STRIP: 6 PH (ref 5–7)
RBC #/AREA URNS AUTO: ABNORMAL /HPF
SOURCE: ABNORMAL
SP GR UR STRIP: 1.01 (ref 1–1.03)
SPECIMEN SOURCE: NORMAL
UROBILINOGEN UR STRIP-ACNC: 0.2 EU/DL (ref 0.2–1)
WBC #/AREA URNS AUTO: ABNORMAL /HPF
WET PREP SPEC: NORMAL

## 2020-08-20 PROCEDURE — 81001 URINALYSIS AUTO W/SCOPE: CPT | Performed by: NURSE PRACTITIONER

## 2020-08-20 PROCEDURE — 87210 SMEAR WET MOUNT SALINE/INK: CPT | Performed by: NURSE PRACTITIONER

## 2020-08-20 NOTE — TELEPHONE ENCOUNTER
Provider E-Visit time total (minutes): 5 minutes.      Please call and triage patient. She reports vaginal pain no specific discharge. Does she have any new sexual partners? Need GC/Chlamydia checked?    I will send an order for UACC and wet prep. She can come in and do those today if able please set up a lab only appt.     If labs normal; she will need an in office visit to assess.       Michaelle Arita, ULIP-BC

## 2020-08-20 NOTE — TELEPHONE ENCOUNTER
Called patient @ [phone] -     Patient noted she does NOT have any new sexual partners - is  x 4 years.           Nena Mccabe RN  [facility]

## 2020-08-21 NOTE — RESULT ENCOUNTER NOTE
Dear Kendra,    Here is a summary of your recent test results:    -Urine is normal.  -No signs of bacteria or yeast vaginal infections.    If symptoms continue I encourage you to be seen in clinic for exam so we can accurately diagnose what is going on. For now I would advise loose fitting underwear, ice pack to the area and ibuprofen if you are able to use this.     For additional lab test information, labtestsonline.org is an excellent reference.    In addition, here is a list of due or overdue Health Maintenance reminders:    Diptheria Tetanus Pertussis (DTAP/TDAP/TD) Vaccine(7 - Td) due on 06/11/2020    Please call us at [phone] (or use [facility]) to address the above recommendations if needed.    Thank you for choosing [facility].  It was an honor and a privilege to participate in your care.       Healthy regards,    Michaelle Arita, TALON  [facility]

## 2020-11-30 DIAGNOSIS — Z30.41 ENCOUNTER FOR SURVEILLANCE OF CONTRACEPTIVE PILLS: ICD-10-CM

## 2020-12-01 RX ORDER — NORGESTIMATE AND ETHINYL ESTRADIOL 0.25-0.035
1 KIT ORAL DAILY
Qty: 84 TABLET | Refills: 3 | Status: CANCELLED | OUTPATIENT
Start: 2020-12-01

## 2020-12-01 NOTE — TELEPHONE ENCOUNTER
Pt due for wellness, LOV11/18/2019    Attempt#1  [employer] message sent.    Rich LINDER RN   [facility]

## 2020-12-30 ENCOUNTER — MYC MEDICAL ADVICE (OUTPATIENT)
Dept: FAMILY MEDICINE | Facility: CLINIC | Age: 25
End: 2020-12-30

## 2020-12-30 NOTE — TELEPHONE ENCOUNTER
Health Maintenance Due   Topic Date Due     ANNUAL REVIEW OF HM ORDERS  1994     HEPATITIS C SCREENING  12/13/2012     DTAP/TDAP/TD IMMUNIZATION (7 - Td) 06/11/2020     INFLUENZA VACCINE (1) 09/01/2020     PREVENTIVE CARE VISIT  11/18/2020     Current Outpatient Medications   Medication     norgestimate-ethinyl estradiol (ESTARYLLA) 0.25-35 MG-MCG tablet     No current facility-administered medications for this visit.      LOV: 2/18/2020    Last PAP: 12/11/2018  Please send patient normal pap letter ([id number]). Pap due in 3 years.    Patient is UTD    Routing to PCP for further review/recommendations/orders.  Anything else you recommend?      Nena Mccabe RN  [facility]

## 2020-12-30 NOTE — TELEPHONE ENCOUNTER
Please call patient.     Routine wellness preventative care recommended yearly even if on no medications.     She can schedule this at her convenience if she would like.      Michaelle Arita, FNP-BC

## 2021-01-03 ENCOUNTER — HEALTH MAINTENANCE LETTER (OUTPATIENT)
Age: 26
End: 2021-01-03

## 2021-01-26 ENCOUNTER — PRENATAL OFFICE VISIT (OUTPATIENT)
Dept: NURSING | Facility: CLINIC | Age: 26
End: 2021-01-26
Payer: COMMERCIAL

## 2021-01-26 DIAGNOSIS — O36.80X0 PREGNANCY WITH INCONCLUSIVE FETAL VIABILITY: Primary | ICD-10-CM

## 2021-01-26 DIAGNOSIS — Z34.01 ENCOUNTER FOR SUPERVISION OF NORMAL FIRST PREGNANCY IN FIRST TRIMESTER: ICD-10-CM

## 2021-01-26 PROBLEM — Z34.00 SUPERVISION OF NORMAL IUP (INTRAUTERINE PREGNANCY) IN PRIMIGRAVIDA: Status: ACTIVE | Noted: 2021-01-26

## 2021-01-26 PROCEDURE — 99207 PR NO CHARGE NURSE ONLY: CPT

## 2021-01-26 NOTE — PROGRESS NOTES
SUBJECTIVE:     HPI:    This is a 25 year old female patient,  who presents for her first obstetrical visit.    TAHMINA: 2021, by Last Menstrual Period.  She is 8w1d weeks at time of nurse visit 21.  Her cycles are regular.  Her last menstrual period was normal.   Since her LMP, she has experienced  nausea, headache, constipation and breast tenderness).   She denies emesis, abdominal pain, fatigue, loss of appetite, vaginal discharge, dysuria, pelvic pain, urinary urgency, lightheadedness, urinary frequency, vaginal bleeding and hemorrhoids.    Additional History: -BMI 35    Have you travelled during the pregnancy?No  Have your sexual partner(s) travelled during the pregnancy?No      HISTORY:   Planned Pregnancy: Yes  Marital Status:  - Juan J  Occupation: RN at [employer]  Living in Household: Spouse and fur babies    Past History:  Her past medical history History reviewed. No pertinent past medical history..      She has a history of  First Pregnancy    Since her last LMP she denies use of alcohol, tobacco and street drugs.    Past medical, surgical, social and family history were reviewed and updated in [facility].      Current Outpatient Medications   Medication     Prenatal Vit-Fe Fumarate-FA (PRENATAL VITAMIN PO)     No current facility-administered medications for this visit.        ROS:   12 point review of systems negative other than symptoms noted below or in the HPI.  Breast: Tenderness  Gastrointestinal: Constipation and Nausea  Neurologic: Headaches    Nurse phone visit completed. Prenatal book and folder (containing standard educational hand-outs and brochures) will be given at first visit to patient. Information in folder reviewed over the phone. Questions answered. Brochure given on optional screening available to assess chromosomal anomalies. Pt will discuss at first visit NIPT. Pt advised to call the clinic if she has any questions or concerns related to her pregnancy.  Prenatal labs future ordered. New prenatal visit scheduled on 2/10/21 with MARCIA Ybarra CNM.      Lab Results   Component Value Date    PAP NIL 12/11/2018     PHQ-9 score:    PHQ 1/25/2021   PHQ-9 Total Score 1   Q9: Thoughts of better off dead/self-harm past 2 weeks Not at all       KRYS-7 SCORE 12/11/2018 1/25/2021   Total Score - 4 (minimal anxiety)   Total Score 0 4

## 2021-02-10 ENCOUNTER — ANCILLARY PROCEDURE (OUTPATIENT)
Dept: ULTRASOUND IMAGING | Facility: CLINIC | Age: 26
End: 2021-02-10
Payer: COMMERCIAL

## 2021-02-10 DIAGNOSIS — O36.80X0 PREGNANCY WITH INCONCLUSIVE FETAL VIABILITY: ICD-10-CM

## 2021-02-10 PROCEDURE — 76817 TRANSVAGINAL US OBSTETRIC: CPT | Performed by: OBSTETRICS & GYNECOLOGY

## 2021-02-11 NOTE — RESULT ENCOUNTER NOTE
Yury Gardner,    Your ultrasound results show that everything is consistent in your pregnancy. Your due date is 9/6/21! No extra follow up is needed. We can't wait to see you for your new ob appointment. Please contact us with any questions or concerns.     Thank you,  Garrett Loza, KATHY, APRN, CNM

## 2021-02-13 PROBLEM — O99.210 OBESITY IN PREGNANCY: Status: ACTIVE | Noted: 2021-02-13

## 2021-02-13 NOTE — PATIENT INSTRUCTIONS
Thank you for coming to see the Midwives at the   [facility]!      Please take prenatal vitamin with DHA and vitamin D3 4,000-5,000 international unit(s) once daily during the entire pregnancy.      We will notify you about your labs that were drawn today once we get the results back.  If you have MyChart your lab results will be posted there.      Someone from the clinic will call you personally or send you a [employer] message with your results.      If you need any refills of medications please call your pharmacy and they will contact us.      If you have a medical emergency please call 911.      If you have any concerns about today's visit, wish to schedule another appointment, or have an urgent medical concern please call our office at [phone]. You can also make appointments through [employer].      After hours you may also call the clinic number above to be connected with [facility]'s after hours triage nurse.  The nurse can page the midwife on call if needed. There is always a midwife on call 24 hours a day.    Prenatal Care Recommendations:    Before 14 weeks: Dating ultrasound, genetic testing       This ultrasound helps us determine your dates accurately. Innatal (genetic screening test) can be drawn anytime after 10 weeks of gestation.    16 weeks: Optional genetic testing single AFP       This testing helps understand your baby's risk for some genetic abnormalities.    18-22 weeks:  Screening anatomy ultrasound       This testing will look for early growth abnormalities, placenta location, and may tell the baby's gender if you wish to find out.    24-27 weeks: One hour diabetes test (GCT) and complete blood count       This test helps identify diabetes of pregnancy or gestational diabetes.  We also look   at the iron in your blood and how well your blood clots.    28 - 36 weeks: Tetanus shot (Tdap)       This shot helps protect you and your baby from whooping cough.    36 weeks and  later: Group B Strep test (GBS)       This test helps predict if you need antibiotics in labor to prevent infection for your baby.    Anytime September to April:  Flu shot       This shot helps protect you and your family from the flu.  This is especially important during pregnancy.        The typical schedule after your first visit today you can expect:     Visit 2 - 12-16 weeks  Visit 3 - 20 weeks  Visit 4 - 24 weeks  Visit 5 - 28 weeks  Visit 6 - 30 weeks  Visit 7 - 32 weeks  Visit 8 - 34 weeks  Visit 9 - 36 weeks  Weekly after 36 weeks until delivery.        Any time during or after your pregnancy you may experience increased depression and/or mood changes.    We are here to support you. Please contact us if you are:    Feeling anxious    Overwhelmed or sad     Trouble sleeping    Crying uncontrollably    Trouble caring for yourself or baby.    Any thoughts of hurting yourself, your baby, or anyone else    If anything comes up between your visits or you have concerns please don't hesitate to contact us.    Secure access to your medical record:  Use [employer] (secure email communication and access to your chart) to send your primary care provider a message or make an appointment. Ask someone on your Team how to sign up for [employer]. To log on to [employer] or for more information in [employer] please visit the website at [email or web address].       Certified Nurse Midwife (CNM) Team    BARB Palmer CNM Melissa Kitzman APRN, CNM, [facility]-BC  ADALID Condon DNP, ADALID Gonsalves DNP, BARB      Again, thank you for choosing the midwives at [facility].  We are excited to be a part of your pregnancy. Please let us know how we can best partner with you to improve your and your family's health.    Remedies for nausea and vomiting with pregnancy      Eat small frequent meals every 2-3 hours if possible.       Avoid food at extremes of temperature and  drinks with carbonation.      Eat foods that appeal to you, avoiding fats and spicy foods.      Avoid liquids with foods.  Drink liquids 30-60 minutes before or after eating and sip slowly.      Bread, pasta, crackers, potatoes, and rice tend to be tolerated the best.      Don't worry about what you eat in the first 3 months, it is more important that you can eat and keep it down.       Try flat ginger ale or ginger tea      Before rising in the morning, eat a small amount of crackers or dry toast.      Peppermint tea      Ginger is a herbal remedy for nausea and you can use it in any form.  There are ginger tablets you can purchase.  The dose 1000 mg a day in divided doses.       You may also try doxylamine (Unisom) 12.5 mg three times a day which is a sleeping medication along with Vitamin B6 25 mg three times a day.  This combination takes up to a week to work so give it some time.       Benadryl (diphenhydramine) 25-50 mg every 8 hour or Dramamine (dimenhydrinate)  mg by mouth every 4-6 hours. Both of these medication may cause some drowsiness      Other things that may help include an Accupressure band and acupuncture      If these methods fail there are many prescriptions that we can try    If you begin to vomit more than 5 or 6 times a day and feel that you are unable to keep anything down, call the [facility] at [phone]    Recommendations for total and rate of weight gain during pregnancy, by prepregnancy BMI    Total weight gain Rates of weight gain*  2nd and 3rd trimester   Prepregnancy BMI Range in kg Range in lbs Mean (range) in kg/week Mean (range) in lbs/week   Underweight (<18.5 kg/m2) 12.5-18 28-40 0.51 (0.44-0.58) 1 (1-1.3)   Normal weight (18.5-24.9 kg/m2) 11.5-16 25-35 0.42 (0.35-0.50) 1 (0.8-1)   Overweight (25.0-29.9 kg/m2) 7-11.5 15-25 0.28 (0.23-0.33) 0.6 (0.5-0.7)   Obese (?30.0 kg/m2) 5-9 11-20 0.22 (0.17-0.27) 0.5 (0.4-0.6)   BMI: body mass index.  *  Calculations assume a 0.5-2 kg (1.1-4.4 lbs) weight gain in the first trimester.  * To calculate BMI go to [email or web address]/[email or web address]/        Constipation    Constipation can be caused by many factors such as poor diet, lack of activity, and medications. Often times women experience more constipation during pregnancy due to decreased intestinal motility.    DRINK TONS OF WATER! 2.5 liters (4 pints) of water per day      Activity is very important. Increase your daily activity to at least 20-60 minutes. This increases blood flow to your gut and improves bowel function    Limit caffeine and alcohol intake    Avoid foods you've identified as constipating    Increase fiber intake: there are ways to increase you fiber through your diet but there are also OTC agents such as Metamucil or Benfiber that you can supplement your diet with    Use probiotics such as Florastor and/or prebiotics such as oligosaccharides    Consider using an Omega 3 supplement, flaxseed and melva seeds are great sources    Plan adequate time for elimination, it is most effective right after activity, and it may be beneficial to plan a consistent time daily    Food Suggestions      Eat beans, nuts, whole grain breads and cereals, oats, barley, figs, apples with skin, raisins, green leafy vegetables, fresh and dried fruits (especially grapes), prunes or prune juice    Eat popcorn nightly    Eat 2-3 salads per day    Add a pinch of cayenne pepper to food    1-2 tbsp of olive oil per day    Lemon juice and/or honey in warm water every morning before breakfast    Decrease red meat, refined white flour products like white rice, white bread and pasta    Tea infusions of: rosemary, chamomile, lemon balm, senna leaf, alfalfa, fennel seeds, lavender, cascara, dandelion, licorice root tea, psyllium seeds, marshmallow root    Other remedies      Increase Vitamin B and E (800 IU if not pregnant, 400 IU if pregnant per day) and potassium, calcium, and magnesium  supplements      If these remedies fail, medications are an option as well. Please talk with your midwife if you continue to struggle with constipation. If you are pregnant please double check with us before starting any medications!

## 2021-02-15 ENCOUNTER — PRENATAL OFFICE VISIT (OUTPATIENT)
Dept: MIDWIFE SERVICES | Facility: CLINIC | Age: 26
End: 2021-02-15
Payer: COMMERCIAL

## 2021-02-15 VITALS — WEIGHT: 214 LBS | BODY MASS INDEX: 36.73 KG/M2 | SYSTOLIC BLOOD PRESSURE: 102 MMHG | DIASTOLIC BLOOD PRESSURE: 66 MMHG

## 2021-02-15 DIAGNOSIS — O09.91 HIGH-RISK PREGNANCY IN FIRST TRIMESTER: Primary | ICD-10-CM

## 2021-02-15 DIAGNOSIS — O99.210 OBESITY IN PREGNANCY: ICD-10-CM

## 2021-02-15 DIAGNOSIS — Z3A.11 11 WEEKS GESTATION OF PREGNANCY: ICD-10-CM

## 2021-02-15 PROBLEM — O09.90 PREGNANCY, SUPERVISION, HIGH-RISK: Status: ACTIVE | Noted: 2021-01-26

## 2021-02-15 LAB
ALBUMIN UR-MCNC: NEGATIVE MG/DL
APPEARANCE UR: CLEAR
BILIRUB UR QL STRIP: NEGATIVE
COLOR UR AUTO: YELLOW
ERYTHROCYTE [DISTWIDTH] IN BLOOD BY AUTOMATED COUNT: 11.7 % (ref 10–15)
GLUCOSE UR STRIP-MCNC: NEGATIVE MG/DL
HBA1C MFR BLD: 4.7 % (ref 0–5.6)
HCT VFR BLD AUTO: 39.2 % (ref 35–47)
HGB BLD-MCNC: 13.3 G/DL (ref 11.7–15.7)
HGB UR QL STRIP: NEGATIVE
KETONES UR STRIP-MCNC: NEGATIVE MG/DL
LEUKOCYTE ESTERASE UR QL STRIP: NEGATIVE
MCH RBC QN AUTO: 30 PG (ref 26.5–33)
MCHC RBC AUTO-ENTMCNC: 33.9 G/DL (ref 31.5–36.5)
MCV RBC AUTO: 88 FL (ref 78–100)
NITRATE UR QL: NEGATIVE
PH UR STRIP: 5.5 PH (ref 5–7)
PLATELET # BLD AUTO: 288 10E9/L (ref 150–450)
RBC # BLD AUTO: 4.44 10E12/L (ref 3.8–5.2)
SOURCE: NORMAL
SP GR UR STRIP: 1.02 (ref 1–1.03)
UROBILINOGEN UR STRIP-ACNC: 0.2 EU/DL (ref 0.2–1)
WBC # BLD AUTO: 9.8 10E9/L (ref 4–11)

## 2021-02-15 PROCEDURE — 86762 RUBELLA ANTIBODY: CPT | Performed by: ADVANCED PRACTICE MIDWIFE

## 2021-02-15 PROCEDURE — 87086 URINE CULTURE/COLONY COUNT: CPT | Performed by: ADVANCED PRACTICE MIDWIFE

## 2021-02-15 PROCEDURE — 85027 COMPLETE CBC AUTOMATED: CPT | Performed by: ADVANCED PRACTICE MIDWIFE

## 2021-02-15 PROCEDURE — 86901 BLOOD TYPING SEROLOGIC RH(D): CPT | Performed by: ADVANCED PRACTICE MIDWIFE

## 2021-02-15 PROCEDURE — 83036 HEMOGLOBIN GLYCOSYLATED A1C: CPT | Performed by: ADVANCED PRACTICE MIDWIFE

## 2021-02-15 PROCEDURE — 87389 HIV-1 AG W/HIV-1&-2 AB AG IA: CPT | Performed by: ADVANCED PRACTICE MIDWIFE

## 2021-02-15 PROCEDURE — 99000 SPECIMEN HANDLING OFFICE-LAB: CPT | Performed by: ADVANCED PRACTICE MIDWIFE

## 2021-02-15 PROCEDURE — 86780 TREPONEMA PALLIDUM: CPT | Mod: 90 | Performed by: ADVANCED PRACTICE MIDWIFE

## 2021-02-15 PROCEDURE — 99207 PR FIRST OB VISIT: CPT | Performed by: ADVANCED PRACTICE MIDWIFE

## 2021-02-15 PROCEDURE — 81003 URINALYSIS AUTO W/O SCOPE: CPT | Performed by: ADVANCED PRACTICE MIDWIFE

## 2021-02-15 PROCEDURE — 86850 RBC ANTIBODY SCREEN: CPT | Performed by: ADVANCED PRACTICE MIDWIFE

## 2021-02-15 PROCEDURE — 87340 HEPATITIS B SURFACE AG IA: CPT | Performed by: ADVANCED PRACTICE MIDWIFE

## 2021-02-15 PROCEDURE — 86900 BLOOD TYPING SEROLOGIC ABO: CPT | Performed by: ADVANCED PRACTICE MIDWIFE

## 2021-02-15 PROCEDURE — 36415 COLL VENOUS BLD VENIPUNCTURE: CPT | Performed by: ADVANCED PRACTICE MIDWIFE

## 2021-02-16 ENCOUNTER — TRANSCRIBE ORDERS (OUTPATIENT)
Dept: MATERNAL FETAL MEDICINE | Facility: CLINIC | Age: 26
End: 2021-02-16

## 2021-02-16 DIAGNOSIS — O26.90 PREGNANCY RELATED CONDITION, ANTEPARTUM: Primary | ICD-10-CM

## 2021-02-16 LAB
ABO + RH BLD: NORMAL
ABO + RH BLD: NORMAL
BLD GP AB SCN SERPL QL: NORMAL
BLOOD BANK CMNT PATIENT-IMP: NORMAL
SPECIMEN EXP DATE BLD: NORMAL
T PALLIDUM AB SER QL: NONREACTIVE

## 2021-02-17 LAB
BACTERIA SPEC CULT: NORMAL
HBV SURFACE AG SERPL QL IA: NONREACTIVE
HIV 1+2 AB+HIV1 P24 AG SERPL QL IA: NONREACTIVE
Lab: NORMAL
RUBV IGG SERPL IA-ACNC: 19 IU/ML
SPECIMEN SOURCE: NORMAL

## 2021-03-10 NOTE — PROGRESS NOTES
Here with Juan J today, both anxious and excited to hear baby's heart beat since they did not get to at the last visit. Feeling well and not nauseous. Noting some R sided pain with abrupt movements or when repositioning in bed at night. Discussed that this is most likely round ligament pain and recommended heat/pressure for relief.  Fetal movement: unsure, reports round ligament pain but no clear movements, lots of fetal movement during auscultation today that Kendra was not able to feel directly.  Discussed that it is normal to feel movement between 18-22 weeks.  Denies loss of fluid/vb/contractions/pelvic pain.  Requested AFP and NIPT today, Juan J is hoping to learn sex ASAP to start thinking of names.  Declined GC/Chlamydia urine collection today.  Level II scheduled at [facility] 4/13  Return to clinic 4 weeks    JUAN Houser    [facility]    I, Isabel Ramos, am serving as a scribe; to document services personally performed by Georgette CORDOBA CNM- based on data collection and the provider's statements to me.    Provider Disclosure:  I agree with above History, Review of Systems, Physical exam and Plan. I have reviewed the content of the documentation and have edited it as needed. I have personally performed the services documented here and the documentation accurately represents those services and the decisions I have made.    ADALID Alvarez CNM

## 2021-03-15 ENCOUNTER — PRENATAL OFFICE VISIT (OUTPATIENT)
Dept: MIDWIFE SERVICES | Facility: CLINIC | Age: 26
End: 2021-03-15
Payer: COMMERCIAL

## 2021-03-15 VITALS — SYSTOLIC BLOOD PRESSURE: 132 MMHG | DIASTOLIC BLOOD PRESSURE: 76 MMHG | BODY MASS INDEX: 36.9 KG/M2 | WEIGHT: 215 LBS

## 2021-03-15 DIAGNOSIS — O09.92 SUPERVISION OF HIGH RISK PREGNANCY IN SECOND TRIMESTER: Primary | ICD-10-CM

## 2021-03-15 DIAGNOSIS — Z3A.15 15 WEEKS GESTATION OF PREGNANCY: ICD-10-CM

## 2021-03-15 DIAGNOSIS — Z13.79 GENETIC SCREENING: ICD-10-CM

## 2021-03-15 DIAGNOSIS — O99.210 OBESITY IN PREGNANCY: ICD-10-CM

## 2021-03-15 PROCEDURE — 99207 PR PRENATAL VISIT: CPT | Performed by: ADVANCED PRACTICE MIDWIFE

## 2021-03-15 PROCEDURE — 99000 SPECIMEN HANDLING OFFICE-LAB: CPT | Performed by: ADVANCED PRACTICE MIDWIFE

## 2021-03-15 PROCEDURE — 99N1100 PR STATISTIC VERIFI PRENATAL TRISOMY 21,18,13: Mod: 90 | Performed by: ADVANCED PRACTICE MIDWIFE

## 2021-03-15 PROCEDURE — 82105 ALPHA-FETOPROTEIN SERUM: CPT | Mod: 90 | Performed by: ADVANCED PRACTICE MIDWIFE

## 2021-03-15 PROCEDURE — 36415 COLL VENOUS BLD VENIPUNCTURE: CPT | Performed by: ADVANCED PRACTICE MIDWIFE

## 2021-03-18 LAB
# FETUSES US: NORMAL
# FETUSES: 1
AFP ADJ MOM AMN: 1.47
AFP SERPL-MCNC: 32 NG/ML
AGE - REPORTED: 26.7 YR
CURRENT SMOKER: NO
CURRENT SMOKER: NO
DIABETES STATUS PATIENT: NO
EGG DONOR AGE: NORMAL
FAMILY MEMBER DISEASES HX: NO
FAMILY MEMBER DISEASES HX: NO
GA METHOD: NORMAL
GA METHOD: NORMAL
GA: NORMAL WK
IDDM PATIENT QL: NO
INTEGRATED SCN PATIENT-IMP: NORMAL
IVF PREGNANCY: NO
LMP START DATE: NORMAL
MONOCHORIONIC TWINS: NO
SERVICE CMNT-IMP: NO
SPECIMEN DRAWN SERPL: NORMAL
VALPROIC/CARBAMAZEPINE STATUS: NO
WEIGHT UNITS: NORMAL

## 2021-03-22 ENCOUNTER — TELEPHONE (OUTPATIENT)
Dept: MIDWIFE SERVICES | Facility: CLINIC | Age: 26
End: 2021-03-22

## 2021-03-22 LAB — LAB SCANNED RESULT: NORMAL

## 2021-03-22 NOTE — TELEPHONE ENCOUNTER
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    Testing done:  Innatal Prenatal Screen    Action:  Spoke to patient and gave NORMAL results and routed to provider.     Gender:  Gender NOT revealed per patient's request.     Pt verbalized understanding, in agreement with plan, and voiced no further questions.    Mely Cazares RN

## 2021-04-06 ENCOUNTER — PRE VISIT (OUTPATIENT)
Dept: MATERNAL FETAL MEDICINE | Facility: CLINIC | Age: 26
End: 2021-04-06

## 2021-04-12 NOTE — PATIENT INSTRUCTIONS
Round Ligament Pain    Pregnancy can entail many normal discomforts. One of those discomforts may be round ligament pain. Round ligament pain occurs as your uterus and your baby grow and the muscles begin to stretch more in your abdomen. Round ligament pain is normal and not dangerous but can be very uncomfortable      Round ligament pain is typically described as an unpleasant sensation that ranges from a sharp knifelike pain to dull intermittent pain in the lower abdominal/suprapubic area of a pregnant woman      Virtually all pregnant women will experience this pain at some point during their pregnancies      It typically manifests between 16 and 20 weeks of pregnancy and can be incredibly bothersome especially for women who remain very active during their pregnancies       Please discuss with your midwife if you are having this type of pain; sometimes the pain can be associated with other medical conditions so it is important for us to assess you just to make sure    Comfort measures    There are certain things you can do to cope with round ligament pain and ease the discomfort you are having      Pregnancy support belt      Tylenol      Hot/ice packs      Baths       Exercise such as yoga and swimming that help stretch muscles      Prenatal massage      Reflexology to waist and pelvic joints       Positioning such as side-lying and hands and knees, make sure your abdomen is  well supported with pillows while doing different positions      Calcium Rich Foods    All premenopausal or women of child-bearing age need to get at least 1000 mg of calcium per day from diet and/or supplementation. Post menopausal women should get at least 1200 mg from diet and/or supplementation.    Food     Amount   Calcium (mg)  Dairy  Yogurt     1 cup   400   Ice Cream/Frozen yogurt 1/2 cup  100  Milk 1% or 2%   1 cup   300  Cheese   1 oz    195-335   Cottage cheese 2%  1 cup   155  Fruits  Orange   1 medium  60  Pear    1  medium  19  Raisins    1/4 cup  18  Vegetables-fresh and/or cooked  Broccoli   1/2 cup  36  Bok Kalpana   1/2 cup  79  Rojas greens   1/2 cup  15  Carrots    1 medium  19  Iceberg lettuce  4 leaves  16  Nuts, Beans, Seeds  Canned baked beans   1/2 cup  100  Canned red kidney beans 1/2 cup  25-80  Canned navy beans  1/2 cup  64  Tofu with calcium sulfate  1/2 cup  150  Soybeans   1 cup   175  Soy milk    1 cup   10  Almonds    1/4 cup  74  Protein  [place]   3 oz   181  Tuna, canned   3 oz   10  Turkey breast   3.5 oz   10  Egg (large)   1 egg   27  Peanut Butter   2 tbsp   11  Beef     3 oz   9  Chicken   1 leg   15  Grain  Amaranth (uncooked)  1 cup   298  Corn tortilla    1 medium  42  Rice (cooked)   1/2 cup  20  Waffle (frozen ~7in)  1   179  Bagel    1   23  Calcium fortified foods/drinks  Orange juice   1 cup   300  Cereal + milk   3/4 cup + 1/2 cup 400  Rice milk   1 cup   300  Lactaid milk    1 cup         GESTATIONAL DIABETES SCREENING    All pregnant women are screened at least once for diabetes as part of their prenatal care. A woman has gestational diabetes if she has high blood sugars for the first time during pregnancy.      Diabetes can harm your health and the health of your baby.  But if we find the diabetes early in pregnancy we will watch your health closely and prevent further problems.       We will check for gestational diabetes during your visit between 24-28 weeks visit. Please note you can not do this prior to 24 weeks of gestation.      Plan to spend about an hour at the clinic.  When you check in let us know that you will be having your diabetes screening that day.       We will give you a 50 gram glucose drink that you have 5 minutes to consume.  Exactly one hour later you will have draw blood from your arm to check your blood sugar level.      We will call you to let you know if your results are normal.  If the results are normal no more testing will be needed.  If your results are not  normal we will discuss follow up testing with you.        You may eat prior to the testing but it is not recommended to eat or drink very sweet things such as pop, juice, candy or dessert type foods.  Eat a high protein, low carb meals prior to testing.    If you have any questions please call:    [facility]    [phone]

## 2021-04-12 NOTE — PROGRESS NOTES
"Feels well overall  Fetal movement: positive \"sometimes\"   Denies loss of fluid/vb/contractions  Anatomy ultrasound at [facility] today.  Baby found to have a heart arrhythmia. Will have a F/U US next week.   FHR 140s with a \"skipped beat.\"  Was told to discontinue caffeine for the week.    GCT visit between 24-28 weeks, handout provided, reminded of longer appointment  Round ligament pain and comfort measures reviewed    Return to clinic 5 weeks    Sita CORDOBA CNM    "

## 2021-04-13 ENCOUNTER — HOSPITAL ENCOUNTER (OUTPATIENT)
Dept: ULTRASOUND IMAGING | Facility: CLINIC | Age: 26
End: 2021-04-13
Attending: ADVANCED PRACTICE MIDWIFE
Payer: COMMERCIAL

## 2021-04-13 ENCOUNTER — OFFICE VISIT (OUTPATIENT)
Dept: MATERNAL FETAL MEDICINE | Facility: CLINIC | Age: 26
End: 2021-04-13
Attending: ADVANCED PRACTICE MIDWIFE
Payer: COMMERCIAL

## 2021-04-13 ENCOUNTER — PRENATAL OFFICE VISIT (OUTPATIENT)
Dept: MIDWIFE SERVICES | Facility: CLINIC | Age: 26
End: 2021-04-13
Payer: COMMERCIAL

## 2021-04-13 VITALS — WEIGHT: 219 LBS | DIASTOLIC BLOOD PRESSURE: 80 MMHG | BODY MASS INDEX: 37.59 KG/M2 | SYSTOLIC BLOOD PRESSURE: 136 MMHG

## 2021-04-13 DIAGNOSIS — O09.92 SUPERVISION OF HIGH RISK PREGNANCY IN SECOND TRIMESTER: Primary | ICD-10-CM

## 2021-04-13 DIAGNOSIS — O36.8390 FETAL HEART RATE/RHYTHM ABNORMALITY AFFECTING MANAGEMENT OF MOTHER: Primary | ICD-10-CM

## 2021-04-13 DIAGNOSIS — O26.90 PREGNANCY RELATED CONDITION, ANTEPARTUM: ICD-10-CM

## 2021-04-13 DIAGNOSIS — Z3A.20 20 WEEKS GESTATION OF PREGNANCY: ICD-10-CM

## 2021-04-13 PROCEDURE — 76811 OB US DETAILED SNGL FETUS: CPT | Mod: 26 | Performed by: OBSTETRICS & GYNECOLOGY

## 2021-04-13 PROCEDURE — 99207 PR PRENATAL VISIT: CPT | Performed by: ADVANCED PRACTICE MIDWIFE

## 2021-04-13 PROCEDURE — 76811 OB US DETAILED SNGL FETUS: CPT

## 2021-04-13 NOTE — PROGRESS NOTES
Anesthesia Transfer of Care Note    Patient: Jordan Altman    Procedure(s) Performed: Procedure(s) (LRB):  TRANSPLANT, LIVER (N/A)    Patient location: ICU    Anesthesia Type: general    Transport from OR: Transported from OR intubated on 100% O2 by AMBU with adequate controlled ventilation. Upon arrival to PACU/ICU, patient attached to ventilator and auscultated to confirm bilateral breath sounds and adequate TV. Continuous ECG monitoring in transport. Continuous SpO2 monitoring in transport. Continuos invasive BP monitoring in transport    Post pain: adequate analgesia    Post assessment: no apparent anesthetic complications    Post vital signs: stable    Level of consciousness: sedated    Nausea/Vomiting: no nausea/vomiting    Complications: none    Transfer of care protocol was followed      Last vitals:   Visit Vitals  /49   Pulse 89   Temp 37   Resp , RR 16, FiO2 60%, PEEP 5, PS 10   Ht 6' (1.829 m)   Wt 114.6 kg (252 lb 10.4 oz)   SpO2 100%   BMI 34.27 kg/m²      Please see ultrasound report under Imaging tab for details of today's ultrasound.    Kailyn Romero MD  Maternal-Fetal Medicine

## 2021-04-20 ENCOUNTER — HOSPITAL ENCOUNTER (OUTPATIENT)
Dept: ULTRASOUND IMAGING | Facility: CLINIC | Age: 26
End: 2021-04-20
Attending: OBSTETRICS & GYNECOLOGY
Payer: COMMERCIAL

## 2021-04-20 ENCOUNTER — OFFICE VISIT (OUTPATIENT)
Dept: MATERNAL FETAL MEDICINE | Facility: CLINIC | Age: 26
End: 2021-04-20
Attending: OBSTETRICS & GYNECOLOGY
Payer: COMMERCIAL

## 2021-04-20 DIAGNOSIS — O36.8390 FETAL HEART RATE/RHYTHM ABNORMALITY AFFECTING MANAGEMENT OF MOTHER: ICD-10-CM

## 2021-04-20 DIAGNOSIS — O36.8390 FETAL HEART RATE/RHYTHM ABNORMALITY AFFECTING MANAGEMENT OF MOTHER: Primary | ICD-10-CM

## 2021-04-20 PROCEDURE — 76815 OB US LIMITED FETUS(S): CPT | Mod: 26 | Performed by: OBSTETRICS & GYNECOLOGY

## 2021-04-20 PROCEDURE — 76815 OB US LIMITED FETUS(S): CPT

## 2021-04-20 NOTE — PROGRESS NOTES
The patient was seen for an ultrasound in the Maternal[facility]Fetal Medicine Center at the [facility] today.  For a detailed report of the ultrasound examination, please see the ultrasound report which can be found under the imaging tab.    Aurea Campbell MD  , OB/GYN  Maternal-Fetal Medicine  [phone] (Pager)

## 2021-05-10 DIAGNOSIS — Z36.9 ENCOUNTER FOR ANTENATAL SCREENING OF MOTHER: Primary | ICD-10-CM

## 2021-05-13 ENCOUNTER — OFFICE VISIT (OUTPATIENT)
Dept: MATERNAL FETAL MEDICINE | Facility: CLINIC | Age: 26
End: 2021-05-13
Attending: OBSTETRICS & GYNECOLOGY
Payer: COMMERCIAL

## 2021-05-13 ENCOUNTER — HOSPITAL ENCOUNTER (OUTPATIENT)
Dept: ULTRASOUND IMAGING | Facility: CLINIC | Age: 26
End: 2021-05-13
Attending: OBSTETRICS & GYNECOLOGY
Payer: COMMERCIAL

## 2021-05-13 PROCEDURE — 76816 OB US FOLLOW-UP PER FETUS: CPT

## 2021-05-13 PROCEDURE — 76816 OB US FOLLOW-UP PER FETUS: CPT | Mod: 26 | Performed by: OBSTETRICS & GYNECOLOGY

## 2021-05-13 NOTE — PROGRESS NOTES
"Please see \"Imaging\" tab under \"Chart Review\" for details of today's US.    Ivania Caro, DO    "

## 2021-05-18 ENCOUNTER — PRENATAL OFFICE VISIT (OUTPATIENT)
Dept: MIDWIFE SERVICES | Facility: CLINIC | Age: 26
End: 2021-05-18
Payer: COMMERCIAL

## 2021-05-18 VITALS — WEIGHT: 229 LBS | BODY MASS INDEX: 39.31 KG/M2 | SYSTOLIC BLOOD PRESSURE: 124 MMHG | DIASTOLIC BLOOD PRESSURE: 70 MMHG

## 2021-05-18 DIAGNOSIS — O09.92 SUPERVISION OF HIGH RISK PREGNANCY IN SECOND TRIMESTER: ICD-10-CM

## 2021-05-18 DIAGNOSIS — Z23 NEED FOR TDAP VACCINATION: ICD-10-CM

## 2021-05-18 DIAGNOSIS — Z3A.24 24 WEEKS GESTATION OF PREGNANCY: Primary | ICD-10-CM

## 2021-05-18 DIAGNOSIS — Z36.9 ENCOUNTER FOR ANTENATAL SCREENING OF MOTHER: ICD-10-CM

## 2021-05-18 DIAGNOSIS — Z23 NEED FOR TDAP VACCINATION: Primary | ICD-10-CM

## 2021-05-18 LAB
ERYTHROCYTE [DISTWIDTH] IN BLOOD BY AUTOMATED COUNT: 11.9 % (ref 10–15)
GLUCOSE 1H P 50 G GLC PO SERPL-MCNC: 110 MG/DL (ref 60–129)
HCT VFR BLD AUTO: 35.7 % (ref 35–47)
HGB BLD-MCNC: 12 G/DL (ref 11.7–15.7)
MCH RBC QN AUTO: 30.6 PG (ref 26.5–33)
MCHC RBC AUTO-ENTMCNC: 33.6 G/DL (ref 31.5–36.5)
MCV RBC AUTO: 91 FL (ref 78–100)
PLATELET # BLD AUTO: 270 10E9/L (ref 150–450)
RBC # BLD AUTO: 3.92 10E12/L (ref 3.8–5.2)
WBC # BLD AUTO: 12.8 10E9/L (ref 4–11)

## 2021-05-18 PROCEDURE — 90471 IMMUNIZATION ADMIN: CPT

## 2021-05-18 PROCEDURE — 86780 TREPONEMA PALLIDUM: CPT | Mod: 90 | Performed by: ADVANCED PRACTICE MIDWIFE

## 2021-05-18 PROCEDURE — 82950 GLUCOSE TEST: CPT | Performed by: ADVANCED PRACTICE MIDWIFE

## 2021-05-18 PROCEDURE — 85027 COMPLETE CBC AUTOMATED: CPT | Performed by: ADVANCED PRACTICE MIDWIFE

## 2021-05-18 PROCEDURE — 99207 PR PRENATAL VISIT: CPT | Performed by: ADVANCED PRACTICE MIDWIFE

## 2021-05-18 PROCEDURE — 36415 COLL VENOUS BLD VENIPUNCTURE: CPT | Performed by: ADVANCED PRACTICE MIDWIFE

## 2021-05-18 PROCEDURE — 90715 TDAP VACCINE 7 YRS/> IM: CPT

## 2021-05-18 PROCEDURE — 99000 SPECIMEN HANDLING OFFICE-LAB: CPT | Performed by: ADVANCED PRACTICE MIDWIFE

## 2021-05-18 NOTE — PROGRESS NOTES
"Feels well  Fetal movement: positive.  got to feel the first kick on Friday. \"That was exciting.\"  Denies loss of fluid/vb/contractions  GCT & CBC today, handout provided, reminded of longer appointment  Tdap today; reviewed CDC recommendations and partner/family vaccination recommended as well  Syphilis screening today  Water birth discussed, patient is not interested  Need for Rhogam? No, O+    Return to clinic 4 weeks    Sita CORDOBA CNM            "

## 2021-05-18 NOTE — PROGRESS NOTES
Prior to immunization administration, verified patients identity using patient s name and date of birth. Please see Immunization Activity for additional information.     Screening Questionnaire for Adult Immunization    Are you sick today?   No   Do you have allergies to medications, food, a vaccine component or latex?   No   Have you ever had a serious reaction after receiving a vaccination?   No   Do you have a long-term health problem with heart, lung, kidney, or metabolic disease (e.g., diabetes), asthma, a blood disorder, no spleen, complement component deficiency, a cochlear implant, or a spinal fluid leak?  Are you on long-term aspirin therapy?   No   Do you have cancer, leukemia, HIV/AIDS, or any other immune system problem?   No   Do you have a parent, brother, or sister with an immune system problem?   No   In the past 3 months, have you taken medications that affect  your immune system, such as prednisone, other steroids, or anticancer drugs; drugs for the treatment of rheumatoid arthritis, Crohn s disease, or psoriasis; or have you had radiation treatments?   No   Have you had a seizure, or a brain or other nervous system problem?   No   During the past year, have you received a transfusion of blood or blood    products, or been given immune (gamma) globulin or antiviral drug?   No   For women: Are you pregnant or is there a chance you could become       pregnant during the next month?   Yes   Have you received any vaccinations in the past 4 weeks?   No     Immunization questionnaire was positive for at least one answer.  Notified GEOVANNA CESPEDES.        Per orders of GEOVANNA CESPEDES, injection of TDAP given by Meena Carlos CMA. Patient instructed to remain in clinic for 15 minutes afterwards, and to report any adverse reaction to me immediately.       Screening performed by Meena Carlos CMA on 5/18/2021 at 3:31 PM.

## 2021-05-19 LAB — T PALLIDUM AB SER QL: NONREACTIVE

## 2021-06-13 NOTE — PATIENT INSTRUCTIONS
SIGNS OF  LABOR    Labor is  if it happens more than three weeks before your due date.    It can be hard to know if you are in labor, since the symptoms can be like the normal feelings of pregnancy.  Often, the only difference is the symptoms increase or they don't go away.     Signs of  labor can include:      Contractions which can feel like period cramps or gas pain.  You may feel it in the lower part of your abdomen, in your back, or as a pressure feeling in your bottom.  It is often regular, coming every 5 or 10 minutes, and  lasting about 30-60 seconds. Some contractions are normal during pregnancy (Boyd kenyon contractions) but if you are feeling more than 5-6 in one hour that is NOT normal    If this occurs empty your bladder, then drink 2-3 glasses of water, eat a snack, and lay down on your left side. Put your hand on your abdomen to count the contractions.  If after one hour of resting you have still had 5-6 contractions call your clinic right away.      If you feel a pop, gush, or trickle of fluid it may mean that your bag of water has broken and you should contact the clinic       You may also experience loose stools and/or rectal pressure       Listen to your body, if something doesn't seem right please call us at the clinic    Risk Factors      Previous  delivery    Bacterial Vaginosis- if you notice a fishy smell to your discharge or experience vaginal itching/discomfort you should be evaluated for infection    Smoking    Drug abuse    Adolescent (teen) pregnancy or advanced maternal age (AMA) age 35 and over    Dehydration (this may not cause  labor but it can cause contractions)    If you think you are in  labor we may do some lab testing in the clinic or send you to the hospital for evaluation    Please call us if you are concerned you are in  labor.    [facility]  [phone]      PREECLAMPSIA SIGNS AND  "SYMPTOMS    Preeclampsia is a dangerous condition that some women develop in the second half of pregnancy. It can also begin after the baby is born.  Preeclampsia causes high blood pressure and can cause problems with many organ systems in your body.  It can also affect the growth of your baby. The exact cause of preeclampsia is unknown, however, there are signs and symptoms to watch for:    -A bad headache that doesn't improve with Tylenol  -Visual changes such as spots, flashes of light, blurry vision  -Pain in the upper right part of your abdomen, especially under the ribs that doesn't go away  -Nausea and/or vomiting  -Feeling extremely tired  -Yellowing of the skin and/or eyes  -Feeling \"not quite right\" or that something is wrong  -An extreme amount of swelling (some swelling in pregnancy is very normal)    If your midwife feels that you are developing preeclampsia, you will have lab tests drawn and will be monitored very closely.     If you are experiencing anyof these symptoms, call the [facility] immediately at [phone].    "

## 2021-06-13 NOTE — PROGRESS NOTES
Feels great!   Fetal movement: positive, denies loss of fluid/vb/contractions  GCT, CBC, tdap already done.    Rhogam: No, O+  Not interested in waterbirth  Reviewed PTL precautions and S&S of PIH, patient verbalizes understanding and what to report  Hospital Registration reminder-online  Measuring S>D, TWG 30# thus far. Will continue to monitor fundal height, discussed considering growth US at 32 weeks if continuing to measure large for dates.   Return to clinic 2 weeks    ADALID Alvarez, CNM

## 2021-06-15 ENCOUNTER — PRENATAL OFFICE VISIT (OUTPATIENT)
Dept: MIDWIFE SERVICES | Facility: CLINIC | Age: 26
End: 2021-06-15
Payer: COMMERCIAL

## 2021-06-15 VITALS — DIASTOLIC BLOOD PRESSURE: 62 MMHG | SYSTOLIC BLOOD PRESSURE: 110 MMHG | WEIGHT: 235 LBS | BODY MASS INDEX: 40.34 KG/M2

## 2021-06-15 DIAGNOSIS — O99.210 OBESITY IN PREGNANCY: Primary | ICD-10-CM

## 2021-06-15 DIAGNOSIS — Z3A.28 28 WEEKS GESTATION OF PREGNANCY: ICD-10-CM

## 2021-06-15 DIAGNOSIS — O09.93 SUPERVISION OF HIGH RISK PREGNANCY IN THIRD TRIMESTER: ICD-10-CM

## 2021-06-15 PROCEDURE — 99207 PR PRENATAL VISIT: CPT | Performed by: ADVANCED PRACTICE MIDWIFE

## 2021-06-28 ENCOUNTER — PRENATAL OFFICE VISIT (OUTPATIENT)
Dept: MIDWIFE SERVICES | Facility: CLINIC | Age: 26
End: 2021-06-28
Payer: COMMERCIAL

## 2021-06-28 VITALS — DIASTOLIC BLOOD PRESSURE: 72 MMHG | SYSTOLIC BLOOD PRESSURE: 102 MMHG | WEIGHT: 239 LBS | BODY MASS INDEX: 41.02 KG/M2

## 2021-06-28 DIAGNOSIS — O26.843 UTERINE SIZE-DATE DISCREPANCY IN THIRD TRIMESTER: ICD-10-CM

## 2021-06-28 DIAGNOSIS — O09.93 SUPERVISION OF HIGH RISK PREGNANCY IN THIRD TRIMESTER: Primary | ICD-10-CM

## 2021-06-28 PROCEDURE — 99207 PR PRENATAL VISIT: CPT | Performed by: ADVANCED PRACTICE MIDWIFE

## 2021-06-28 NOTE — PROGRESS NOTES
Feels well no complaints  Fetal Movement: positive, denies loss of fluid/vb, no contractions  Fetal kick counts reviewed and handout given  Reviewed PTL precautions and s/sx of preeclampsia; denies any S&S and aware of what to report    Growth U/S ordered yes    Return to clinic in 2 weeks    Brandi Blount, APRN, CNM

## 2021-06-28 NOTE — PATIENT INSTRUCTIONS
Fetal Kick Counts    It is important to know when your baby's movements occur. We often get busy with work and life and do not pay close attention to their movements.        Women typically begin feeling movement between 18-22 weeks of gestation, sometimes it can be earlier or later depending on where your placenta is       Movements usually begin feeling like popping or fluttering and as the baby grows they become more pronounced    Toward the end of pregnancy as the baby gets larger they may not move as much or make as big of movements. Babies have maturing sleep cycles as well as not as much room to move and flip. If you are ever concerned about your baby's movements or have not felt the baby move for a while, we recommend you do a fetal kick count. Prior to starting your count drink a glass of water or juice and eat a snack. Then lay down on your side and begin to count movements.     How to do a Fetal Kick Counts    There are many different ways to monitor your baby's movements. Movements can range from large jabs to small kicks, or wiggles.  Hiccups count!      Count 10 movements in 2 hours when resting and focusing    Count 10 movements in 12 hours when doing normal activity    We recommend that if movements occur but seem decreased that you should be seen in the clinic or hospital for evaluation within 12 hours. If fetal movement is absent or fetal kick counts are low please contact us right away.    If you ever have any concerns about your baby's movements DO NOT HESITATE to call us, we are here for you!    [facility]  [phone]    PREECLAMPSIA SIGNS AND SYMPTOMS    Preeclampsia is a dangerous condition that some women develop in the second half of pregnancy. It can also begin after the baby is born.  Preeclampsia causes high blood pressure and can cause problems with many organ systems in your body.  It can also affect the growth of your baby. The exact cause of preeclampsia is  "unknown, however, there are signs and symptoms to watch for:    -A bad headache that doesn't improve with Tylenol  -Visual changes such as spots, flashes of light, blurry vision  -Pain in the upper right part of your abdomen, especially under the ribs that doesn't go away  -Nausea and/or vomiting  -Feeling extremely tired  -Yellowing of the skin and/or eyes  -Feeling \"not quite right\" or that something is wrong  -An extreme amount of swelling (some swelling in pregnancy is very normal)    If your midwife feels that you are developing preeclampsia, you will have lab tests drawn and will be monitored very closely.     If you are experiencing anyof these symptoms, call the [facility] immediately at [phone].SIGNS OF  LABOR    Labor is  if it happens more than three weeks before your due date.    It can be hard to know if you are in labor, since the symptoms can be like the normal feelings of pregnancy.  Often, the only difference is the symptoms increase or they don't go away.     Signs of  labor can include:    Change in your vaginal discharge:  You will have more vaginal discharge when you are pregnant and it should be creamy white.  Call the clinic right away if your discharge has a foul odor, pink or bloody,  or if it becomes watery or is more than is normal for you during your pregnancy.    More than 5-6 contractions or tightenings per hour.  Contractions can feel like period cramps or bowel (gas or diarrhea) pain.  You will feel it in the lower part of your abdomen, in your back or as a pressure feeling in your bottom.  It is often regular, coming for 30 seconds or a minute and then going away, only to come back 5 or 10 minutes later. Some contractions are normal during pregnancy, but if you are feeling more than 5-6 in one hour, empty your bladder, then drink 16-24 ounces of water, eat a snack and lay down on your left side. Put your hand on your abdomen to count " the contractions.  If after one hour of resting you have still had 5-6 contractions call your clinic.

## 2021-07-15 NOTE — PROGRESS NOTES
Feels well other than having a lower constant back ache after getting out of the shower.   Unsure if she stretched weird when sleeping or changed positions too rapidly.  Back ache worse when trying to bend over. Achy, tried warm blankets, lumbar support behind chair and took Tylenol, none of which helped much.  Comforts measures discussed.   Growth US today for pre-pregnancy BMI >35. Will release report to [facility] after MD reviews  Fetal Movement: positive, denies loss of fluid/vb, no contractions  Fetal kick counts/movement reviewed  Reviewed PTL precautions and s/sx of preeclampsia; denies any S&S and aware of what to report     Peds chosen: [facility]. Does not have a specific provider in mind.   Pediatrician: Hep B, Vit K, Erythromycin and circumcision; advised to check insurance for hospital vs clinic    Plans to breastfeed Yes  Breastfeeding class planned: Hasn't looked into anything yet. Discussed LC in the hospital, peds offices and suggested looking into RIK. [employer] handout provided. Will give rx for pump at 34 or 36 week visit    Return to clinic 2 weeks      ADALID Hart CNM

## 2021-07-16 ENCOUNTER — PRENATAL OFFICE VISIT (OUTPATIENT)
Dept: MIDWIFE SERVICES | Facility: CLINIC | Age: 26
End: 2021-07-16
Attending: ADVANCED PRACTICE MIDWIFE
Payer: COMMERCIAL

## 2021-07-16 ENCOUNTER — ANCILLARY PROCEDURE (OUTPATIENT)
Dept: ULTRASOUND IMAGING | Facility: CLINIC | Age: 26
End: 2021-07-16
Attending: ADVANCED PRACTICE MIDWIFE
Payer: COMMERCIAL

## 2021-07-16 VITALS — SYSTOLIC BLOOD PRESSURE: 110 MMHG | BODY MASS INDEX: 40.72 KG/M2 | WEIGHT: 237.2 LBS | DIASTOLIC BLOOD PRESSURE: 60 MMHG

## 2021-07-16 DIAGNOSIS — Z34.03 ENCOUNTER FOR SUPERVISION OF NORMAL FIRST PREGNANCY IN THIRD TRIMESTER: Primary | ICD-10-CM

## 2021-07-16 DIAGNOSIS — O09.93 SUPERVISION OF HIGH RISK PREGNANCY IN THIRD TRIMESTER: ICD-10-CM

## 2021-07-16 DIAGNOSIS — Z3A.32 32 WEEKS GESTATION OF PREGNANCY: ICD-10-CM

## 2021-07-16 DIAGNOSIS — O26.843 UTERINE SIZE-DATE DISCREPANCY IN THIRD TRIMESTER: ICD-10-CM

## 2021-07-16 DIAGNOSIS — O99.210 OBESITY IN PREGNANCY: ICD-10-CM

## 2021-07-16 PROCEDURE — 76816 OB US FOLLOW-UP PER FETUS: CPT | Performed by: OBSTETRICS & GYNECOLOGY

## 2021-07-16 PROCEDURE — 99207 PR PRENATAL VISIT: CPT | Performed by: ADVANCED PRACTICE MIDWIFE

## 2021-07-16 NOTE — PATIENT INSTRUCTIONS

## 2021-07-29 NOTE — PROGRESS NOTES
Kendra states that overall she is doing OK.  Has had some constipation.    Fetal Movement: positive, denies loss of fluid/vb, no  contractions  Fetal kick counts/movement reviewed    Reviewed PTL precautions and s/sx of preeclampsia; denies any S&S and aware of what to report  Have car seat Yes  Discussed GBS/cx check at next visit  Reviewed OTC measures for constipation, tried fiber in the past and didn't like the taste.  Will likely try colace PRN.   Return to clinic 2 weeks    Aylin CORDOBA, BARB, NP-BC  [phone]

## 2021-07-30 ENCOUNTER — PRENATAL OFFICE VISIT (OUTPATIENT)
Dept: MIDWIFE SERVICES | Facility: CLINIC | Age: 26
End: 2021-07-30
Payer: COMMERCIAL

## 2021-07-30 VITALS — SYSTOLIC BLOOD PRESSURE: 122 MMHG | BODY MASS INDEX: 41.32 KG/M2 | DIASTOLIC BLOOD PRESSURE: 60 MMHG | WEIGHT: 240.7 LBS

## 2021-07-30 DIAGNOSIS — O99.210 OBESITY IN PREGNANCY: ICD-10-CM

## 2021-07-30 DIAGNOSIS — O09.93 SUPERVISION OF HIGH RISK PREGNANCY IN THIRD TRIMESTER: Primary | ICD-10-CM

## 2021-07-30 DIAGNOSIS — Z3A.34 34 WEEKS GESTATION OF PREGNANCY: ICD-10-CM

## 2021-07-30 PROCEDURE — 99207 PR PRENATAL VISIT: CPT | Performed by: NURSE PRACTITIONER

## 2021-07-30 NOTE — PATIENT INSTRUCTIONS
"PREECLAMPSIA SIGNS AND SYMPTOMS    Preeclampsia is a dangerous condition that some women develop in the second half of pregnancy. It can also begin after the baby is born.  Preeclampsia causes high blood pressure and can cause problems with many organ systems in your body.  It can also affect the growth of your baby. The exact cause of preeclampsia is unknown, however, there are signs and symptoms to watch for:    -A bad headache that doesn't improve with Tylenol  -Visual changes such as spots, flashes of light, blurry vision  -Pain in the upper right part of your abdomen, especially under the ribs that doesn't go away  -Nausea and/or vomiting  -Feeling extremely tired  -Yellowing of the skin and/or eyes  -Feeling \"not quite right\" or that something is wrong  -An extreme amount of swelling (some swelling in pregnancy is very normal)    If your midwife feels that you are developing preeclampsia, you will have lab tests drawn and will be monitored very closely.     If you are experiencing anyof these symptoms, call the [facility] immediately at [phone].    SIGNS OF  LABOR    Labor is  if it happens more than three weeks before your due date.    It can be hard to know if you are in labor, since the symptoms can be like the normal feelings of pregnancy.  Often, the only difference is the symptoms increase or they don't go away.     Signs of  labor can include:      Contractions which can feel like period cramps or gas pain.  You may feel it in the lower part of your abdomen, in your back, or as a pressure feeling in your bottom.  It is often regular, coming every 5 or 10 minutes, and  lasting about 30-60 seconds. Some contractions are normal during pregnancy ([place] kenyon contractions) but if you are feeling more than 5-6 in one hour that is NOT normal    If this occurs empty your bladder, then drink 2-3 glasses of water, eat a snack, and lay down on your left side. Put " your hand on your abdomen to count the contractions.  If after one hour of resting you have still had 5-6 contractions call your clinic right away.      If you feel a pop, gush, or trickle of fluid it may mean that your bag of water has broken and you should contact the clinic       You may also experience loose stools and/or rectal pressure       Listen to your body, if something doesn't seem right please call us at the clinic    Risk Factors      Previous  delivery    Bacterial Vaginosis- if you notice a fishy smell to your discharge or experience vaginal itching/discomfort you should be evaluated for infection    Smoking    Drug abuse    Adolescent (teen) pregnancy or advanced maternal age (AMA) age 35 and over    Dehydration (this may not cause  labor but it can cause contractions)    If you think you are in  labor we may do some lab testing in the clinic or send you to the hospital for evaluation    Please call us if you are concerned you are in  labor.    [facility]  [phone]    Fetal Kick Counts    It is important to know when your baby's movements occur. We often get busy with work and life and do not pay close attention to their movements.        Women typically begin feeling movement between 18-22 weeks of gestation, sometimes it can be earlier or later depending on where your placenta is       Movements usually begin feeling like popping or fluttering and as the baby grows they become more pronounced    Toward the end of pregnancy as the baby gets larger they may not move as much or make as big of movements. Babies have maturing sleep cycles as well as not as much room to move and flip. If you are ever concerned about your baby's movements or have not felt the baby move for a while, we recommend you do a fetal kick count. Prior to starting your count drink a glass of water or juice and eat a snack. Then lay down on your side and begin to count  movements.     How to do a Fetal Kick Counts    There are many different ways to monitor your baby's movements. Movements can range from large jabs to small kicks, or wiggles.  Hiccups count!      Count 10 movements in 2 hours when resting and focusing    Count 10 movements in 12 hours when doing normal activity    We recommend that if movements occur but seem decreased that you should be seen in the clinic or hospital for evaluation within 12 hours. If fetal movement is absent or fetal kick counts are low please contact us right away.    If you ever have any concerns about your baby's movements DO NOT HESITATE to call us, we are here for you!    [facility] for [facility]  [phone]        GROUP B STREP    Group B Strep (GBS) is a common bacteria that is sometimes found in the vagina, urinary tract or rectum.  It is not harmful typically to adults but can cause serious illness in newborns.  It occasionally is passed from mother to baby during birth.   It is important to test in pregnancy.  When a woman is found to be positive for GBS, either at the first prenatal visit or by taking a culture at 36 weeks, treatment will be offered to reduce the chance of spreading the bacteria to the baby.       Treatment consists of either oral antibiotics early in pregnancy or antibiotics given by IV during labor if testing is positive at 36 weeks.      Even without treatment the baby rarely (1-2% of the time) gets infected.  With treatment the baby almost never gets infected.       There really isn't anything you can do to keep from getting or being positive for GBS.  It isn't sexually transmitted and there are no symptoms if you are positive.       Your midwife will discuss your results with you and make recommendations for treatment.    Constipation    Constipation can be caused by many factors such as poor diet, lack of activity, and medications. Often times women experience more constipation during pregnancy due to  decreased intestinal motility.    DRINK TONS OF WATER! 2.5 liters (4 pints) of water per day      Activity is very important. Increase your daily activity to at least 20-60 minutes. This increases blood flow to your gut and improves bowel function    Limit caffeine and alcohol intake    Avoid foods you've identified as constipating    Increase fiber intake: there are ways to increase you fiber through your diet but there are also OTC agents such as Metamucil or Benfiber that you can supplement your diet with    Use probiotics such as Florastor and/or prebiotics such as oligosaccharides    Consider using an Omega 3 supplement, flaxseed and melva seeds are great sources    Plan adequate time for elimination, it is most effective right after activity, and it may be beneficial to plan a consistent time daily    Food Suggestions      Eat beans, nuts, whole grain breads and cereals, oats, barley, figs, apples with skin, raisins, green leafy vegetables, fresh and dried fruits (especially grapes), prunes or prune juice    Eat popcorn nightly    Eat 2-3 salads per day    Add a pinch of cayenne pepper to food    1-2 tbsp of olive oil per day    Lemon juice and/or honey in warm water every morning before breakfast    Decrease red meat, refined white flour products like white rice, white bread and pasta    Tea infusions of: rosemary, chamomile, lemon balm, senna leaf, alfalfa, fennel seeds, lavender, cascara, dandelion, licorice root tea, psyllium seeds, marshmallow root    Other remedies      Increase Vitamin B and E (800 IU if not pregnant, 400 IU if pregnant per day) and potassium, calcium, and magnesium supplements      If these remedies fail, medications are an option as well. Please talk with your midwife if you continue to struggle with constipation. If you are pregnant please double check with us before starting any medications!

## 2021-08-02 ENCOUNTER — TELEPHONE (OUTPATIENT)
Dept: OBGYN | Facility: CLINIC | Age: 26
End: 2021-08-02

## 2021-08-02 NOTE — TELEPHONE ENCOUNTER
Called pt with response from the midwife below. Pt has felt 6 movements in 25-30 minutes so far. Instructed to monitor - 10 kicks in 2 hours.  Pt will continue to monitor and call with ANY Concerns.    Pt verbalized understanding, in agreement with plan, and voiced no further questions.  Mely Cazares RN on 8/2/2021 at 4:11 PM

## 2021-08-02 NOTE — TELEPHONE ENCOUNTER
Agree, spotting can be common in last weeks of pregnancy (justine if recent intercourse, straining with bowel movement). If continues to have bright red spotting/bleeding she should call back. If decreased FM will need to present to [facility] for assessment.     ADALID Alvarez, CNM

## 2021-08-02 NOTE — TELEPHONE ENCOUNTER
Came home from work and found a pea size blood on pad  No cramping  Last felt baby move around 1200 today  Feels good otherwise  No additional discharge, no fluid leaking.    No s/s of preeclampsia (headache)    Reassured pt that may be r/t cervical changes late in pregnancy.  Recommended pt do kick counts over next 2 hours for reassurance.  If pt does not get required 20 movements in 2 hours to call clinic and ask for provider (will be after 5).    Will route to Midwife for additional recommendations or to call pt.    Galilea Brooks RN on 8/2/2021 at 3:50 PM

## 2021-08-09 ENCOUNTER — PRENATAL OFFICE VISIT (OUTPATIENT)
Dept: MIDWIFE SERVICES | Facility: CLINIC | Age: 26
End: 2021-08-09
Payer: COMMERCIAL

## 2021-08-09 VITALS — DIASTOLIC BLOOD PRESSURE: 60 MMHG | WEIGHT: 241.7 LBS | SYSTOLIC BLOOD PRESSURE: 124 MMHG | BODY MASS INDEX: 41.49 KG/M2

## 2021-08-09 DIAGNOSIS — O09.93 SUPERVISION OF HIGH RISK PREGNANCY IN THIRD TRIMESTER: ICD-10-CM

## 2021-08-09 DIAGNOSIS — Z36.85 SCREENING, ANTENATAL, FOR STREPTOCOCCUS B: Primary | ICD-10-CM

## 2021-08-09 PROCEDURE — 87653 STREP B DNA AMP PROBE: CPT | Performed by: ADVANCED PRACTICE MIDWIFE

## 2021-08-09 PROCEDURE — 99207 PR PRENATAL VISIT: CPT | Performed by: ADVANCED PRACTICE MIDWIFE

## 2021-08-09 NOTE — PATIENT INSTRUCTIONS
"PREECLAMPSIA SIGNS AND SYMPTOMS    Preeclampsia is a dangerous condition that some women develop in the second half of pregnancy. It can also begin after the baby is born.  Preeclampsia causes high blood pressure and can cause problems with many organ systems in your body.  It can also affect the growth of your baby. The exact cause of preeclampsia is unknown, however, there are signs and symptoms to watch for:    -A bad headache that doesn't improve with Tylenol  -Visual changes such as spots, flashes of light, blurry vision  -Pain in the upper right part of your abdomen, especially under the ribs that doesn't go away  -Nausea and/or vomiting  -Feeling extremely tired  -Yellowing of the skin and/or eyes  -Feeling \"not quite right\" or that something is wrong  -An extreme amount of swelling (some swelling in pregnancy is very normal)    If your midwife feels that you are developing preeclampsia, you will have lab tests drawn and will be monitored very closely.     If you are experiencing anyof these symptoms, call the [facility] immediately at [phone].  Fetal Kick Counts    It is important to know when your baby's movements occur. We often get busy with work and life and do not pay close attention to their movements.        Women typically begin feeling movement between 18-22 weeks of gestation, sometimes it can be earlier or later depending on where your placenta is       Movements usually begin feeling like popping or fluttering and as the baby grows they become more pronounced    Toward the end of pregnancy as the baby gets larger they may not move as much or make as big of movements. Babies have maturing sleep cycles as well as not as much room to move and flip. If you are ever concerned about your baby's movements or have not felt the baby move for a while, we recommend you do a fetal kick count. Prior to starting your count drink a glass of water or juice and eat a snack. Then lay " "down on your side and begin to count movements.     How to do a Fetal Kick Counts    There are many different ways to monitor your baby's movements. Movements can range from large jabs to small kicks, or wiggles.  Hiccups count!      Count 10 movements in 2 hours when resting and focusing    Count 10 movements in 12 hours when doing normal activity    We recommend that if movements occur but seem decreased that you should be seen in the clinic or hospital for evaluation within 12 hours. If fetal movement is absent or fetal kick counts are low please contact us right away.    If you ever have any concerns about your baby's movements DO NOT HESITATE to call us, we are here for you!    [facility]  [phone]      Labor Instructions for Midwife Patients    When to call:  Both during and after office hours call  [phone]. There is a triage RN to take your calls and answer your questions 24 hours a day.  If they cannot answer your question they will page the midwife on call for you.    When to call:  Call anytime you have important concerns about you or your baby.     Call if:    You are having contractions at regular intervals about 5-6 minutes apart lasting 30-60 seconds and becoming increasingly more intense     You have an uncontrollable gush of fluid from your vagina or feel a pop and gush like your water has broken    You have HEAVY bleeding, like heavy period, blood running down your legs, or  soaking a pad.     Some bleeding after a pelvic exam, after intercourse, or in labor when your cervix is dilating is normal and is referred to as \"bloody show\"    You have severe, continuous back or abdominal pain    You feel it is time to go to the hospital    If this is your first labor, call when contractions are very intense and have been about every 3-4 minutes for about an hour    If it is your second labor or more, call when contractions are strong and about every 3-5 minutes or sooner " depending on your level of discomfort.     Keep in mind we are always here for you! If you have questions, concerns please don't hesitate to call us.     What to eat/drink in labor: Drink plenty of fluid (water most importantly, juice, soda or tea without caffeine). Eat rice, pasta, soup, cereal, bread/toast, and fruit. Avoid dairy and greasy food as they are difficult to digest and you may experience some nausea during labor.    Comfort measures:    Baths and showers (ok even with ruptured membranes, it may temporarily slow contractions if you are still in the early stage of labor)    Warm/hot packs for back pain or discomfort    Back, belly, or thigh massages    Standing, rocking, walking, leaning over bed or tables, side-lying and sleeping    Miscellaneous:     Contractions are timed from the beginning of one to the beginning of the next    Try hard to sleep during the early stage of labor when you are not that uncomfortable. Timing of contractions at this point is not important    Even if you cannot sleep, resting in bed or on the couch can help you maintain your energy for labor    When you arrive at the hospital the nurse will check your baby's heartbeat, check your cervix, and will call us. The midwife on call will come in and be with you when you are in active labor    After hours you need to enter the hospital through the emergency room

## 2021-08-09 NOTE — PROGRESS NOTES
Feels tired of being pregnant.  Looking forward to being done.   She feels the baby will be large like 9 lbs  Fetal movement: positive  Denies lof, no contractions.   Had a couple spots of blood about a week ago.  None since.  Nothing that she can think of was related to it like intercourse or BM, though she is constipated.   GBS swab done today  Vaginal exam done, confirmed vertex      Labor instructions given   Unhappy that she received a bill for her last ultrasound.    Discussed follow up .     Warning signs reviewed  Patient denies S&S of pre-eclampsia and aware of what to report   Return to clinic 1 week    ADALID Nguyen, JOLENEM

## 2021-08-10 LAB
GP B STREP DNA SPEC QL NAA+PROBE: NEGATIVE
PATIENT PENICILLIN, AMOXICILLIN, CEPHALOSPORINS ALLERGY: NO

## 2021-08-15 NOTE — PATIENT INSTRUCTIONS
"Labor Instructions for Midwife Patients    When to call:  Both during and after office hours call  [phone]. There is a triage RN to take your calls and answer your questions 24 hours a day.  If she cannot answer your question she will page the midwife on call for you.    When to call:  Call anytime you have important concerns about you or your baby.     Call if:    You are having contractions at regular intervals about 5-6 minutes apart lasting 30-60 seconds and becoming increasingly more intense     You have an uncontrollable gush of fluid from your vagina or feel a pop and gush like your water has broken    You have HEAVY bleeding, like heavy period, blood running down your legs, or  soaking a pad.     Some bleeding after a pelvic exam, after intercourse, or in labor when your cervix is dilating is normal and is referred to as \"bloody show\"    You have severe, continuous back or abdominal pain    You feel it is time to go to the hospital    If this is your first labor, call when contractions are very intense and have been about every 3-4 minutes for about an hour    If it is your second labor or more, call when contractions are strong and about every 3-5 minutes or sooner depending on your level of discomfort.     Keep in mind we are always here for you! If you have questions, concerns please don't hesitate to call us.     What to eat/drink in labor: Drink plenty of fluid (water most importantly, juice, soda or tea without caffeine). Eat rice, pasta, soup, cereal, bread/toast, and fruit. Avoid dairy and greasy food as they are difficult to digest and you may experience some nausea during labor.    Comfort measures:    Baths and showers (ok even with ruptured membranes, it may temporarily slow contractions if you are still in the early stage of labor)    Warm/hot packs for back pain or discomfort    Back, belly, or thigh massages    Standing, rocking, walking, leaning over bed or tables, side-lying and " "sleeping    Miscellaneous:     Contractions are timed from the beginning of one to the beginning of the next    Try hard to sleep during the early stage of labor when you are not that uncomfortable. Timing of contractions at this point is not important    Even if you cannot sleep, resting in bed or on the couch can help you maintain your energy for labor    When you arrive at the hospital the nurse will check your baby's heartbeat, check your cervix, and will call us. The midwife on call will come in and be with you when you are in active labor    After hours you need to enter the hospital through the emergency room          PREECLAMPSIA SIGNS AND SYMPTOMS    Preeclampsia is a dangerous condition that some women develop in the second half of pregnancy. It can also begin after the baby is born.  Preeclampsia causes high blood pressure and can cause problems with many organ systems in your body.  It can also affect the growth of your baby. The exact cause of preeclampsia is unknown, however, there are signs and symptoms to watch for:    -A bad headache that doesn't improve with Tylenol  -Visual changes such as spots, flashes of light, blurry vision  -Pain in the upper right part of your abdomen, especially under the ribs that doesn't go away  -Nausea and/or vomiting  -Feeling extremely tired  -Yellowing of the skin and/or eyes  -Feeling \"not quite right\" or that something is wrong  -An extreme amount of swelling (some swelling in pregnancy is very normal)    If your midwife feels that you are developing preeclampsia, you will have lab tests drawn and will be monitored very closely.     If you are experiencing anyof these symptoms, call the [facility] immediately at [phone].              NATURAL LABOR PREPARATION    So, you're getting closer and closer to your due date and wondering what you can do to help get your body ready for labor.   Here are some natural methods you can try:    NOTE: DO NOT " begin any of the following prior to 36 completed weeks of pregnancy!    Evening Primrose Oil: Take 1000mg by mouth three times per day. This encourages the cervix to ripen. Cervical ripening is when your cervix becomes more soft and stretchy to get ready for dilation and effacement.     Red Raspberry Leaf Tea: Red raspberry tea (get it at a Co[employer]op or in the grocery store). Drink three cups per day (hot or cold). This can help to prepare the uterine muscles for labor.              Fetal Kick Counts    It is important to know when your baby's movements occur. We often get busy with work and life and do not pay close attention to their movements.        Women typically begin feeling movement between 18-22 weeks of gestation, sometimes it can be earlier or later depending on where your placenta is       Movements usually begin feeling like popping or fluttering and as the baby grows they become more pronounced    Toward the end of pregnancy as the baby gets larger they may not move as much or make as big of movements. Babies have maturing sleep cycles as well as not as much room to move and flip. If you are ever concerned about your baby's movements or have not felt the baby move for a while, we recommend you do a fetal kick count. Prior to starting your count drink a glass of water or juice and eat a snack. Then lay down on your side and begin to count movements.     How to do a Fetal Kick Counts    There are many different ways to monitor your baby's movements. Movements can range from large jabs to small kicks, or wiggles.  Hiccups count!      Count 10 movements in 2 hours when resting and focusing    Count 10 movements in 12 hours when doing normal activity    We recommend that if movements occur but seem decreased that you should be seen in the clinic or hospital for evaluation within 12 hours. If fetal movement is absent or fetal kick counts are low please contact us right away.    If you ever have any concerns  about your baby's movements DO NOT HESITATE to call us, we are here for you!    [facility] for [facility]  [phone]

## 2021-08-15 NOTE — PROGRESS NOTES
Feels well overall. Here alone today. Desperately wants to be done.   Fetal movement: positive  Denies vaginal bleeding/lof, random contractions  Discussed that IOL can be offered at 39 weeks due to BMI of 35. This is a low priority IOL and she may get differed if other higher priority clients need an IOL. Discussed +/- of IOL (especially long labors for 39w IOLs). She is considering it and will talk about it at her next visit.   Warning signs reviewed  Labor signs and symptoms discussed, aware of numbers to call  Denies s/sx of preeclampsia and aware of what to report  Discussed MyChart check-in, 2-week and 6-week postpartum visits  Plans for birth control after baby: Slynd POP  Return to clinic 1 week    Garrett Loza, DNP, APRN, CNM

## 2021-08-18 ENCOUNTER — PRENATAL OFFICE VISIT (OUTPATIENT)
Dept: MIDWIFE SERVICES | Facility: CLINIC | Age: 26
End: 2021-08-18
Payer: COMMERCIAL

## 2021-08-18 VITALS — BODY MASS INDEX: 41.88 KG/M2 | SYSTOLIC BLOOD PRESSURE: 110 MMHG | DIASTOLIC BLOOD PRESSURE: 74 MMHG | WEIGHT: 244 LBS

## 2021-08-18 DIAGNOSIS — O36.8390 FETAL ARRHYTHMIA AFFECTING PREGNANCY, ANTEPARTUM: ICD-10-CM

## 2021-08-18 DIAGNOSIS — Z3A.37 37 WEEKS GESTATION OF PREGNANCY: ICD-10-CM

## 2021-08-18 DIAGNOSIS — O09.93 SUPERVISION OF HIGH RISK PREGNANCY IN THIRD TRIMESTER: Primary | ICD-10-CM

## 2021-08-18 PROCEDURE — 99207 PR PRENATAL VISIT: CPT | Performed by: ADVANCED PRACTICE MIDWIFE

## 2021-08-23 ENCOUNTER — PRENATAL OFFICE VISIT (OUTPATIENT)
Dept: MIDWIFE SERVICES | Facility: CLINIC | Age: 26
End: 2021-08-23
Payer: COMMERCIAL

## 2021-08-23 VITALS — WEIGHT: 243.7 LBS | BODY MASS INDEX: 41.83 KG/M2 | DIASTOLIC BLOOD PRESSURE: 66 MMHG | SYSTOLIC BLOOD PRESSURE: 112 MMHG

## 2021-08-23 DIAGNOSIS — O09.93 SUPERVISION OF HIGH RISK PREGNANCY IN THIRD TRIMESTER: ICD-10-CM

## 2021-08-23 PROCEDURE — 99207 PR PRENATAL VISIT: CPT | Performed by: ADVANCED PRACTICE MIDWIFE

## 2021-08-23 NOTE — PROGRESS NOTES
Feels well.  No complaints.   Fetal movement: positive  Denies vaginal bleeding/lof, some  Contractions on Saturday.   Warning signs reviewed   Labor signs and symptoms discussed, aware of numbers to call  Denies s/sx of pre-eclampsia and aware of what to report  Return to clinic 1 week    Brandi Blount, ADALID, CNM

## 2021-09-01 ENCOUNTER — PRENATAL OFFICE VISIT (OUTPATIENT)
Dept: MIDWIFE SERVICES | Facility: CLINIC | Age: 26
End: 2021-09-01
Payer: COMMERCIAL

## 2021-09-01 VITALS — BODY MASS INDEX: 42.4 KG/M2 | SYSTOLIC BLOOD PRESSURE: 118 MMHG | WEIGHT: 247 LBS | DIASTOLIC BLOOD PRESSURE: 66 MMHG

## 2021-09-01 DIAGNOSIS — O09.93 SUPERVISION OF HIGH RISK PREGNANCY IN THIRD TRIMESTER: Primary | ICD-10-CM

## 2021-09-01 DIAGNOSIS — O26.03 EXCESSIVE WEIGHT GAIN DURING PREGNANCY IN THIRD TRIMESTER: ICD-10-CM

## 2021-09-01 PROCEDURE — 99207 PR PRENATAL VISIT: CPT | Performed by: ADVANCED PRACTICE MIDWIFE

## 2021-09-01 NOTE — PROGRESS NOTES
Kendra Haynes presents to clinic alone at 39w2d for a routine prenatal appointment. She reports she is feeling well and has no concerns. Normal fetal movement. Denies bleeding, LOF, or regular, painful contractions. Denies headache / vision changes / RUQ pain.    BMI 35: accepts recommendation for repeat growth ultrasound + BPP early next week. Worried about cost. Encouraged to check with her insurance in advance.    Excessive weight gain: total weight gain of 19.1 kg (42 lb). Significantly above range for expected total weight gain of 5 kg (11 lb)-9 kg (19 lb). Growth ultrasound planned for next week.    COVID-19 Mitigation: Declines vaccination in pregnancy. Reviewed updated [employer] policies, COVID vaccine recommended for all pregnant people. Strongly encouraged Kendra to consider vaccination now; if not now, then revisit postpartum.  VE for 1/50/-2/post/soft, aguirre 4. Vertex confirmed by BSUS. Pregnancy checklist updated. Warning signs reviewed. RTC in 1 week, sooner if problems.

## 2021-09-06 ENCOUNTER — NURSE TRIAGE (OUTPATIENT)
Dept: NURSING | Facility: CLINIC | Age: 26
End: 2021-09-06

## 2021-09-06 ENCOUNTER — HOSPITAL ENCOUNTER (INPATIENT)
Facility: CLINIC | Age: 26
LOS: 3 days | Discharge: HOME | End: 2021-09-09
Attending: ADVANCED PRACTICE MIDWIFE | Admitting: ADVANCED PRACTICE MIDWIFE
Payer: COMMERCIAL

## 2021-09-06 ENCOUNTER — TELEPHONE (OUTPATIENT)
Dept: MIDWIFE SERVICES | Facility: CLINIC | Age: 26
End: 2021-09-06

## 2021-09-06 ENCOUNTER — ANESTHESIA (OUTPATIENT)
Dept: OBGYN | Facility: CLINIC | Age: 26
End: 2021-09-06
Payer: COMMERCIAL

## 2021-09-06 ENCOUNTER — ANESTHESIA EVENT (OUTPATIENT)
Dept: OBGYN | Facility: CLINIC | Age: 26
End: 2021-09-06
Payer: COMMERCIAL

## 2021-09-06 DIAGNOSIS — Z98.891 S/P CESAREAN SECTION: Primary | ICD-10-CM

## 2021-09-06 LAB
ABO/RH(D): NORMAL
ALBUMIN SERPL-MCNC: 2.7 G/DL (ref 3.4–5)
ALP SERPL-CCNC: 203 U/L (ref 40–150)
ALT SERPL W P-5'-P-CCNC: 18 U/L (ref 0–50)
ANION GAP SERPL CALCULATED.3IONS-SCNC: 6 MMOL/L (ref 3–14)
ANTIBODY SCREEN: NEGATIVE
AST SERPL W P-5'-P-CCNC: 15 U/L (ref 0–45)
BASOPHILS # BLD AUTO: 0 10E3/UL (ref 0–0.2)
BASOPHILS NFR BLD AUTO: 0 %
BILIRUB SERPL-MCNC: 0.3 MG/DL (ref 0.2–1.3)
BUN SERPL-MCNC: 8 MG/DL (ref 7–30)
CALCIUM SERPL-MCNC: 9 MG/DL (ref 8.5–10.1)
CHLORIDE BLD-SCNC: 109 MMOL/L (ref 94–109)
CO2 SERPL-SCNC: 23 MMOL/L (ref 20–32)
CREAT SERPL-MCNC: 0.66 MG/DL (ref 0.52–1.04)
EOSINOPHIL # BLD AUTO: 0.1 10E3/UL (ref 0–0.7)
EOSINOPHIL NFR BLD AUTO: 1 %
ERYTHROCYTE [DISTWIDTH] IN BLOOD BY AUTOMATED COUNT: 12.7 % (ref 10–15)
GFR SERPL CREATININE-BSD FRML MDRD: >90 ML/MIN/1.73M2
GLUCOSE BLD-MCNC: 77 MG/DL (ref 70–99)
HCT VFR BLD AUTO: 36.2 % (ref 35–47)
HGB BLD-MCNC: 12 G/DL (ref 11.7–15.7)
IMM GRANULOCYTES # BLD: 0.1 10E3/UL
IMM GRANULOCYTES NFR BLD: 1 %
LYMPHOCYTES # BLD AUTO: 2.1 10E3/UL (ref 0.8–5.3)
LYMPHOCYTES NFR BLD AUTO: 17 %
MCH RBC QN AUTO: 29 PG (ref 26.5–33)
MCHC RBC AUTO-ENTMCNC: 33.1 G/DL (ref 31.5–36.5)
MCV RBC AUTO: 87 FL (ref 78–100)
MONOCYTES # BLD AUTO: 0.7 10E3/UL (ref 0–1.3)
MONOCYTES NFR BLD AUTO: 6 %
NEUTROPHILS # BLD AUTO: 9.3 10E3/UL (ref 1.6–8.3)
NEUTROPHILS NFR BLD AUTO: 75 %
NRBC # BLD AUTO: 0 10E3/UL
NRBC BLD AUTO-RTO: 0 /100
PLATELET # BLD AUTO: 277 10E3/UL (ref 150–450)
POTASSIUM BLD-SCNC: 3.7 MMOL/L (ref 3.4–5.3)
PROT SERPL-MCNC: 6.5 G/DL (ref 6.8–8.8)
RBC # BLD AUTO: 4.14 10E6/UL (ref 3.8–5.2)
RUPTURE OF FETAL MEMBRANES BY ROM PLUS: POSITIVE
SARS-COV-2 RNA RESP QL NAA+PROBE: NEGATIVE
SODIUM SERPL-SCNC: 138 MMOL/L (ref 133–144)
SPECIMEN EXPIRATION DATE: NORMAL
URATE SERPL-MCNC: 4.2 MG/DL (ref 2.6–6)
WBC # BLD AUTO: 12.4 10E3/UL (ref 4–11)

## 2021-09-06 PROCEDURE — 258N000003 HC RX IP 258 OP 636: Performed by: ADVANCED PRACTICE MIDWIFE

## 2021-09-06 PROCEDURE — 84112 EVAL AMNIOTIC FLUID PROTEIN: CPT | Performed by: ADVANCED PRACTICE MIDWIFE

## 2021-09-06 PROCEDURE — 82040 ASSAY OF SERUM ALBUMIN: CPT | Performed by: ADVANCED PRACTICE MIDWIFE

## 2021-09-06 PROCEDURE — 250N000011 HC RX IP 250 OP 636: Performed by: ANESTHESIOLOGY

## 2021-09-06 PROCEDURE — 86780 TREPONEMA PALLIDUM: CPT | Performed by: ADVANCED PRACTICE MIDWIFE

## 2021-09-06 PROCEDURE — 250N000011 HC RX IP 250 OP 636: Performed by: ADVANCED PRACTICE MIDWIFE

## 2021-09-06 PROCEDURE — 250N000009 HC RX 250: Performed by: ADVANCED PRACTICE MIDWIFE

## 2021-09-06 PROCEDURE — 85025 COMPLETE CBC W/AUTO DIFF WBC: CPT | Performed by: ADVANCED PRACTICE MIDWIFE

## 2021-09-06 PROCEDURE — 84550 ASSAY OF BLOOD/URIC ACID: CPT | Performed by: ADVANCED PRACTICE MIDWIFE

## 2021-09-06 PROCEDURE — 86900 BLOOD TYPING SEROLOGIC ABO: CPT | Performed by: ADVANCED PRACTICE MIDWIFE

## 2021-09-06 PROCEDURE — 82947 ASSAY GLUCOSE BLOOD QUANT: CPT | Performed by: ADVANCED PRACTICE MIDWIFE

## 2021-09-06 PROCEDURE — 59025 FETAL NON-STRESS TEST: CPT

## 2021-09-06 PROCEDURE — G0463 HOSPITAL OUTPT CLINIC VISIT: HCPCS | Mod: 25

## 2021-09-06 PROCEDURE — 87635 SARS-COV-2 COVID-19 AMP PRB: CPT | Performed by: ADVANCED PRACTICE MIDWIFE

## 2021-09-06 PROCEDURE — 120N000001 HC R&B MED SURG/OB

## 2021-09-06 PROCEDURE — 36415 COLL VENOUS BLD VENIPUNCTURE: CPT | Performed by: ADVANCED PRACTICE MIDWIFE

## 2021-09-06 RX ORDER — NALOXONE HYDROCHLORIDE 0.4 MG/ML
0.2 INJECTION, SOLUTION INTRAMUSCULAR; INTRAVENOUS; SUBCUTANEOUS
Status: DISCONTINUED | OUTPATIENT
Start: 2021-09-06 | End: 2021-09-07 | Stop reason: HOSPADM

## 2021-09-06 RX ORDER — ACETAMINOPHEN 325 MG/1
650 TABLET ORAL EVERY 4 HOURS PRN
Status: DISCONTINUED | OUTPATIENT
Start: 2021-09-06 | End: 2021-09-07 | Stop reason: HOSPADM

## 2021-09-06 RX ORDER — LIDOCAINE 40 MG/G
CREAM TOPICAL
Status: DISCONTINUED | OUTPATIENT
Start: 2021-09-06 | End: 2021-09-07 | Stop reason: HOSPADM

## 2021-09-06 RX ORDER — PROCHLORPERAZINE 25 MG
25 SUPPOSITORY, RECTAL RECTAL EVERY 12 HOURS PRN
Status: DISCONTINUED | OUTPATIENT
Start: 2021-09-06 | End: 2021-09-07 | Stop reason: HOSPADM

## 2021-09-06 RX ORDER — OXYTOCIN 10 [USP'U]/ML
10 INJECTION, SOLUTION INTRAMUSCULAR; INTRAVENOUS
Status: DISCONTINUED | OUTPATIENT
Start: 2021-09-06 | End: 2021-09-07 | Stop reason: HOSPADM

## 2021-09-06 RX ORDER — METOCLOPRAMIDE HYDROCHLORIDE 5 MG/ML
10 INJECTION INTRAMUSCULAR; INTRAVENOUS EVERY 6 HOURS PRN
Status: DISCONTINUED | OUTPATIENT
Start: 2021-09-06 | End: 2021-09-06 | Stop reason: HOSPADM

## 2021-09-06 RX ORDER — ROPIVACAINE HYDROCHLORIDE 2 MG/ML
10 INJECTION, SOLUTION EPIDURAL; INFILTRATION; PERINEURAL ONCE
Status: DISCONTINUED | OUTPATIENT
Start: 2021-09-06 | End: 2021-09-07 | Stop reason: HOSPADM

## 2021-09-06 RX ORDER — IBUPROFEN 600 MG/1
600 TABLET, FILM COATED ORAL
Status: DISCONTINUED | OUTPATIENT
Start: 2021-09-06 | End: 2021-09-07 | Stop reason: ALTCHOICE

## 2021-09-06 RX ORDER — OXYTOCIN/0.9 % SODIUM CHLORIDE 30/500 ML
340 PLASTIC BAG, INJECTION (ML) INTRAVENOUS CONTINUOUS PRN
Status: DISCONTINUED | OUTPATIENT
Start: 2021-09-06 | End: 2021-09-07 | Stop reason: HOSPADM

## 2021-09-06 RX ORDER — ONDANSETRON 4 MG/1
4 TABLET, ORALLY DISINTEGRATING ORAL EVERY 6 HOURS PRN
Status: DISCONTINUED | OUTPATIENT
Start: 2021-09-06 | End: 2021-09-07 | Stop reason: HOSPADM

## 2021-09-06 RX ORDER — KETOROLAC TROMETHAMINE 30 MG/ML
30 INJECTION, SOLUTION INTRAMUSCULAR; INTRAVENOUS
Status: DISCONTINUED | OUTPATIENT
Start: 2021-09-06 | End: 2021-09-07 | Stop reason: ALTCHOICE

## 2021-09-06 RX ORDER — TRANEXAMIC ACID 10 MG/ML
1 INJECTION, SOLUTION INTRAVENOUS EVERY 30 MIN PRN
Status: DISCONTINUED | OUTPATIENT
Start: 2021-09-06 | End: 2021-09-07 | Stop reason: HOSPADM

## 2021-09-06 RX ORDER — ONDANSETRON 2 MG/ML
4 INJECTION INTRAMUSCULAR; INTRAVENOUS EVERY 6 HOURS PRN
Status: DISCONTINUED | OUTPATIENT
Start: 2021-09-06 | End: 2021-09-06 | Stop reason: HOSPADM

## 2021-09-06 RX ORDER — MISOPROSTOL 200 UG/1
800 TABLET ORAL
Status: DISCONTINUED | OUTPATIENT
Start: 2021-09-06 | End: 2021-09-07 | Stop reason: HOSPADM

## 2021-09-06 RX ORDER — PROCHLORPERAZINE 25 MG
25 SUPPOSITORY, RECTAL RECTAL EVERY 12 HOURS PRN
Status: DISCONTINUED | OUTPATIENT
Start: 2021-09-06 | End: 2021-09-06 | Stop reason: HOSPADM

## 2021-09-06 RX ORDER — NALOXONE HYDROCHLORIDE 0.4 MG/ML
0.4 INJECTION, SOLUTION INTRAMUSCULAR; INTRAVENOUS; SUBCUTANEOUS
Status: DISCONTINUED | OUTPATIENT
Start: 2021-09-06 | End: 2021-09-07 | Stop reason: HOSPADM

## 2021-09-06 RX ORDER — PROCHLORPERAZINE MALEATE 5 MG
10 TABLET ORAL EVERY 6 HOURS PRN
Status: DISCONTINUED | OUTPATIENT
Start: 2021-09-06 | End: 2021-09-06 | Stop reason: HOSPADM

## 2021-09-06 RX ORDER — TERBUTALINE SULFATE 1 MG/ML
0.25 INJECTION, SOLUTION SUBCUTANEOUS
Status: DISCONTINUED | OUTPATIENT
Start: 2021-09-06 | End: 2021-09-07 | Stop reason: HOSPADM

## 2021-09-06 RX ORDER — FENTANYL CITRATE 50 UG/ML
50-100 INJECTION, SOLUTION INTRAMUSCULAR; INTRAVENOUS
Status: DISCONTINUED | OUTPATIENT
Start: 2021-09-06 | End: 2021-09-07 | Stop reason: HOSPADM

## 2021-09-06 RX ORDER — ONDANSETRON 2 MG/ML
4 INJECTION INTRAMUSCULAR; INTRAVENOUS EVERY 6 HOURS PRN
Status: DISCONTINUED | OUTPATIENT
Start: 2021-09-06 | End: 2021-09-07 | Stop reason: HOSPADM

## 2021-09-06 RX ORDER — CARBOPROST TROMETHAMINE 250 UG/ML
250 INJECTION, SOLUTION INTRAMUSCULAR
Status: DISCONTINUED | OUTPATIENT
Start: 2021-09-06 | End: 2021-09-07 | Stop reason: HOSPADM

## 2021-09-06 RX ORDER — METOCLOPRAMIDE HYDROCHLORIDE 5 MG/ML
10 INJECTION INTRAMUSCULAR; INTRAVENOUS EVERY 6 HOURS PRN
Status: DISCONTINUED | OUTPATIENT
Start: 2021-09-06 | End: 2021-09-07 | Stop reason: HOSPADM

## 2021-09-06 RX ORDER — FENTANYL CITRATE-0.9 % NACL/PF 10 MCG/ML
100 PLASTIC BAG, INJECTION (ML) INTRAVENOUS EVERY 5 MIN PRN
Status: DISCONTINUED | OUTPATIENT
Start: 2021-09-06 | End: 2021-09-07 | Stop reason: HOSPADM

## 2021-09-06 RX ORDER — METOCLOPRAMIDE 10 MG/1
10 TABLET ORAL EVERY 6 HOURS PRN
Status: DISCONTINUED | OUTPATIENT
Start: 2021-09-06 | End: 2021-09-06 | Stop reason: HOSPADM

## 2021-09-06 RX ORDER — ROPIVACAINE HYDROCHLORIDE 2 MG/ML
INJECTION, SOLUTION EPIDURAL; INFILTRATION; PERINEURAL
Status: DISCONTINUED
Start: 2021-09-06 | End: 2021-09-07 | Stop reason: HOSPADM

## 2021-09-06 RX ORDER — OXYTOCIN/0.9 % SODIUM CHLORIDE 30/500 ML
100-340 PLASTIC BAG, INJECTION (ML) INTRAVENOUS CONTINUOUS PRN
Status: DISCONTINUED | OUTPATIENT
Start: 2021-09-06 | End: 2021-09-09 | Stop reason: HOSPADM

## 2021-09-06 RX ORDER — ONDANSETRON 4 MG/1
4 TABLET, ORALLY DISINTEGRATING ORAL EVERY 6 HOURS PRN
Status: DISCONTINUED | OUTPATIENT
Start: 2021-09-06 | End: 2021-09-06 | Stop reason: HOSPADM

## 2021-09-06 RX ORDER — OXYTOCIN/0.9 % SODIUM CHLORIDE 30/500 ML
1-24 PLASTIC BAG, INJECTION (ML) INTRAVENOUS CONTINUOUS
Status: DISCONTINUED | OUTPATIENT
Start: 2021-09-06 | End: 2021-09-07 | Stop reason: HOSPADM

## 2021-09-06 RX ORDER — PROCHLORPERAZINE MALEATE 5 MG
10 TABLET ORAL EVERY 6 HOURS PRN
Status: DISCONTINUED | OUTPATIENT
Start: 2021-09-06 | End: 2021-09-07 | Stop reason: HOSPADM

## 2021-09-06 RX ORDER — METHYLERGONOVINE MALEATE 0.2 MG/ML
200 INJECTION INTRAVENOUS
Status: DISCONTINUED | OUTPATIENT
Start: 2021-09-06 | End: 2021-09-07 | Stop reason: HOSPADM

## 2021-09-06 RX ORDER — MISOPROSTOL 200 UG/1
400 TABLET ORAL
Status: DISCONTINUED | OUTPATIENT
Start: 2021-09-06 | End: 2021-09-07 | Stop reason: HOSPADM

## 2021-09-06 RX ORDER — ROPIVACAINE HYDROCHLORIDE 2 MG/ML
INJECTION, SOLUTION EPIDURAL; INFILTRATION; PERINEURAL
Status: COMPLETED | OUTPATIENT
Start: 2021-09-06 | End: 2021-09-06

## 2021-09-06 RX ORDER — OXYTOCIN 10 [USP'U]/ML
10 INJECTION, SOLUTION INTRAMUSCULAR; INTRAVENOUS
Status: DISCONTINUED | OUTPATIENT
Start: 2021-09-06 | End: 2021-09-09 | Stop reason: HOSPADM

## 2021-09-06 RX ORDER — NALBUPHINE HYDROCHLORIDE 10 MG/ML
2.5-5 INJECTION, SOLUTION INTRAMUSCULAR; INTRAVENOUS; SUBCUTANEOUS EVERY 6 HOURS PRN
Status: DISCONTINUED | OUTPATIENT
Start: 2021-09-06 | End: 2021-09-09 | Stop reason: HOSPADM

## 2021-09-06 RX ORDER — METOCLOPRAMIDE 10 MG/1
10 TABLET ORAL EVERY 6 HOURS PRN
Status: DISCONTINUED | OUTPATIENT
Start: 2021-09-06 | End: 2021-09-07 | Stop reason: HOSPADM

## 2021-09-06 RX ADMIN — Medication 12 ML/HR: at 19:21

## 2021-09-06 RX ADMIN — FENTANYL CITRATE 100 MCG: 50 INJECTION, SOLUTION INTRAMUSCULAR; INTRAVENOUS at 18:24

## 2021-09-06 RX ADMIN — SODIUM CHLORIDE, POTASSIUM CHLORIDE, SODIUM LACTATE AND CALCIUM CHLORIDE 1000 ML: 600; 310; 30; 20 INJECTION, SOLUTION INTRAVENOUS at 17:59

## 2021-09-06 RX ADMIN — Medication 2 MILLI-UNITS/MIN: at 22:47

## 2021-09-06 RX ADMIN — ROPIVACAINE HYDROCHLORIDE 10 ML: 2 INJECTION, SOLUTION EPIDURAL; INFILTRATION at 19:25

## 2021-09-06 NOTE — PLAN OF CARE
Covid swab done. Sent to lab    Patient walked to room 214. Report given to Kacie MARINA RN   Benefits, risks, and possible complications of procedure explained to patient/caregiver who verbalized understanding and gave verbal consent.

## 2021-09-06 NOTE — PROVIDER NOTIFICATION
09/06/21 1456   Provider Notification   Provider Name/Title Georgette Grimes CNM   Method of Notification Phone   Request Evaluate in Person   Notification Reason SVE;Status Update;Uterine Activity;Labor Status;Other (Comment)  (ROM+ positive)   Telephone call to Georgette Grimes CNM, Updated on positive ROM+, SVE, elevated BP's and uterine activity. Orders to admit to labor and delivery. Cornelio DAY will put in orders and come assess patient. Plan to scan patient when she gets here, as writer was unable to determine if cephalic presentation.

## 2021-09-06 NOTE — PLAN OF CARE
Kendra admitted into 214 for labor, accompanied by spouse Juan J. Oriented to room and surroundings. Leaking clear fluid. External fetal monitors applied after pt gave verbal consent. FHTs with baseline , moderate variability, +accels, -decels. Domingo every 3-3.5 minutes. AVSS. Denies vaginal bleeding. Active fetal movement. IV placed into Lf hand. Pt hoping to have a natural labor with nitrous oxide. Reviewed all pain medication options and positioning. Planning on breastfeeding.

## 2021-09-06 NOTE — TELEPHONE ENCOUNTER
"OB Triage Call  Patient phone     Is patient's OB/Midwife with the [facility] or [facility]? [facility]- Proceed with triage   Small cramping.   Reason for call: \"My water just broke.\"    Assessment: Patient reporting \"gush of clear watery fluid.\" Symptoms occurring in past few minutes.  Mild abdominal cramping. Patient reporting fetal movement positive 1 hour ago. Stating she had been cleaning over past hour and was not paying attention to movement.   Plan: Paged on call Midwife through [facility]     Patient plans to deliver at [facility]    Patient's primary OB Provider is Midwives through [facility].      Is patient's primary OB from [place]? No- Patient's primary OB provider is a Midwife. Is patient in labor or is Midwife consult needed? Yes- Paged on-call midwife for patient's primary OB clinic (refer to where patient is seen as midwives may go to multiple locations) [place] to call FNA back at [phone].  Call returned at 1202 and advised on triage assessment. Does midwife recommend L&D evaluation? No- Per midwife on-call Midwife will call patient directly.         40w0d    Estimated Date of Delivery: Sep 6, 2021        OB History    Para Term  AB Living   1 0 0 0 0 0   SAB TAB Ectopic Multiple Live Births   0 0 0 0 0      # Outcome Date GA Lbr Jakub/2nd Weight Sex Delivery Anes PTL Lv   1 Current                No results found for: GBS       Bela Sommer RN 21 11:54 AM  [facility] Nurse Advisor    Reason for Disposition    Leakage of fluid from vagina    Additional Information    Negative: [1] SEVERE abdominal pain (e.g., excruciating) AND [2] constant AND [3] present > 1 hour    Negative: Severe bleeding (e.g., continuous red blood from vagina, or large blood clots)    Negative: Umbilical cord hanging out of the vagina (shiny, white, curled appearance, \"like telephone cord\")    Negative: Uncontrollable urge to push (i.e., feels like " baby is coming out now)    Negative: Can see baby    Negative: Sounds like a life-threatening emergency to the triager    Negative: < 20 weeks pregnant    Negative: Vaginal bleeding    Protocols used: PREGNANCY - RUPTURE OF [id number]

## 2021-09-06 NOTE — PLAN OF CARE
"[place] [facility], per services of Georgette Rodas CNM. Patient reports that she was cleaning and felt a \"pop\" then had fluid running down her legs. Today is patient's due date.     Verbal permission given to apply external monitors. History and vital signs taken. ROM+ collected and sent. SVE 1/50/-3. Pt has started having painful contractions since the leaking started.   "

## 2021-09-06 NOTE — H&P
[facility] Labor Admission History & Physical    Kendra Haynes is a 25 year old  with an IUP at 40w0d  ;   Partner/support Person: Juan J  Language Barrier: English  [facility]  Provider: CN's    Kendra Haynes is admitted to the [facility] at [facility] on 2021 at 4:03 PM       History of present inllness/Chief Complaint:  Kendra called FNA at 1145 with SROM at 1130. I spoke with Kendra at 1200, she opted for expectant management at home, however she started having painful contractions shortly after we spoke and presented to [facility] for evaluation     Here with: spontaneous onset of labor and spontaneous rupture of membranes  Patient reports contractions are Regular           Baby active Yes  Membranes are grossly ruptured since 1130.  Bloody show No   Any changes with medical history since last prenatal visit No      Obstetrical history  Estimated Date of Delivery: Sep 6, 2021 determined by LMP  Patient's last menstrual period was 2020.   Dating U/S: 02/10/21    Fetal anatomic survey: Abnormal: EIF, suboptimal heart/face views, and fetal PACs. PACs resolved on follow-up US at 20 weeks. Remainder of fetal anatomy well visualized and WNL at 23 weeks  Placenta: anterior    PRENATAL COURSE  Prenatal care began at 11 wks gestation for a total of 12 prenatal visits.  Total wt gain 42; Body mass index is 42.4 kg/m .  Prenatal Blood Pressure: WNL  Prenatal course was complicated by obesity (pre-pregnancy BMI 35), fetal PACs (resolved at 20 weeks), excessive weight gain   Tdap: 21    Patient Active Problem List   Diagnosis     Pregnancy, supervision, high-risk     Obesity in pregnancy     BMI 35.0-35.9,adult     Fetal arrhythmia affecting pregnancy, antepartum     Excessive weight gain during pregnancy in third trimester     Indication for care in labor or delivery       HISTORY  Allergies   Allergen Reactions     Clindamycin Hives     History reviewed. No  pertinent past medical history.  Past Surgical History:   Procedure Laterality Date     TONSILLECTOMY & ADENOIDECTOMY Bilateral 1998     Family History   Problem Relation Age of Onset     Coronary Artery Disease Early Onset Father      Hypertension Father      Diabetes Paternal Grandfather      Coronary Artery Disease Early Onset Paternal Grandfather      Cancer Paternal Grandmother      Social History     Tobacco Use     Smoking status: Never Smoker     Smokeless tobacco: Never Used   Substance Use Topics     Alcohol use: Not Currently     Comment: 0-1 drink every 3-4 months     OB History    Para Term  AB Living   1 0 0 0 0 0   SAB TAB Ectopic Multiple Live Births   0 0 0 0 0      # Outcome Date GA Lbr Jakub/2nd Weight Sex Delivery Anes PTL Lv   1 Current                LABS:  Lab Results   Component Value Date    ABO O 02/15/2021    RH Pos 02/15/2021    AS Neg 02/15/2021    HGB 12.0 2021    HEPBANG Nonreactive 02/15/2021    CHPCRT Negative 2018    GCPCRT Negative 2018       GBS Status: neg  Rubella: Immune    HIV: Non-Reactive   Platelets:  277  1hr GCT:  110    ROS   Pt is alert and oriented  Pt denies significant constitutional symptoms including fever and/or malaise.    Pt denies significant respiratory, cardiovacular, GI, or muscular/skeletal complaints.    Neuro: Denies HA and visual changes  Muscoloskeletal: Denies except for discomforts r/t pregnancy     PHYSICAL EXAM:  /81   Resp 18   Wt 112 kg (247 lb)   LMP 2020   BMI 42.40 kg/m    General appearance:  healthy, alert, active, uncomfortable with contractions    Heart: RRR  Lungs: CTA bilaterally, normal respiratory effort  Abdomen: gravid, single vertex fetus, non-tender, EFW 8-8.5 lbs.   Legs:  Trace edema     Contractions: Pt is jeffrey every 2-4 minutes, lasting 60-70 seconds and palpates mild    Fetal heart tones: Baseline 135   Variability: moderate   Accelerations: present  Decelerations:  absent    NST: reactive    Cervix: 1/ 50%/ Posterior/-3 per RN exam in MAC. Confirmed vertex via BSUS by CNM at 1545   Bloody show: no  Membranes:  Ruptured x4 hrs, clear fluid     ASSESSMENT:  25 year old  with morrissey IUP 40w0d in early labor  Membranes ruptured   NST reactive  GBS negative  Elevated BP x1 in MAC   Asymptomatic for preeclampsia   Preeclampsia labs in process  COVID pending   Obesity     PLAN:  Routine CNM care  Labs ordered: CBC, syphilis screening, COVID test, and type and cross  Teaching done r/t comfort measures, pain management options, and labor processes. Discussed expectations for first and second stage of labor   Admit - see IP orders  Ok for intermittent monitoring given reactive NST upon admission   Expectant management. Consider pitocin augmentation if contractions space and/or lack of cervical change   Pain medication - would like to avoid epidural. Nitrous and IV fentanyl discussed in detail   Recommended upright positioning, birth ball and frequent position changes for comfort and to facilitate fetal descent and rotation   Preeclampsia labs drawn as a precaution, has not had further elevated BP since the isolated elevated BP upon arrive to [facility]  Anticipate   Report given to oncoming CNSHAWN Licea who is assuming care at 1700    ADALID Alvarez CNM

## 2021-09-06 NOTE — TELEPHONE ENCOUNTER
"Received page from [facility] at 5671, returned call the [facility] nurse who states Kendra called with possible SROM. Called and spoke with Kendra she states that about 30 minutes ago she felt a gush of fluid she could not control and believes her water is broken. Reports fluid is clear in color, no foul odor. Is having some cramping but is overall comfortable. Reports positive fetal movement, although movements haven't been \"as big\" as they have been previously. Reports still getting necessary movements per kick counts.     Discussed srom vs urinary leakage vs vaginal discharge. She reports that as she sits on the toilet she has continued leaking, discussed likely her water is broken. Reviewed options for expectant management at home vs hospital evaluation. Kendra states that they still need to pack her bags and she would like to shower, get ready etc so likely she will present to the hospital around 1500. Discussed warning signs and when to present sooner (change in fluid color, vaginal bleeding, concerns for fetal movement, s/sx infection). L&D called and pt information given to charge LAMINE Grimes, APRN, CNM    "

## 2021-09-07 PROBLEM — O42.90 PROLONGED RUPTURE OF MEMBRANES: Status: ACTIVE | Noted: 2021-09-07

## 2021-09-07 LAB — T PALLIDUM AB SER QL: NONREACTIVE

## 2021-09-07 PROCEDURE — 258N000003 HC RX IP 258 OP 636: Performed by: NURSE ANESTHETIST, CERTIFIED REGISTERED

## 2021-09-07 PROCEDURE — 250N000011 HC RX IP 250 OP 636: Performed by: OBSTETRICS & GYNECOLOGY

## 2021-09-07 PROCEDURE — 250N000009 HC RX 250: Performed by: ADVANCED PRACTICE MIDWIFE

## 2021-09-07 PROCEDURE — 370N000017 HC ANESTHESIA TECHNICAL FEE, PER MIN: Performed by: OBSTETRICS & GYNECOLOGY

## 2021-09-07 PROCEDURE — 710N000010 HC RECOVERY PHASE 1, LEVEL 2, PER MIN: Performed by: OBSTETRICS & GYNECOLOGY

## 2021-09-07 PROCEDURE — 250N000011 HC RX IP 250 OP 636: Performed by: NURSE ANESTHETIST, CERTIFIED REGISTERED

## 2021-09-07 PROCEDURE — 360N000076 HC SURGERY LEVEL 3, PER MIN: Performed by: OBSTETRICS & GYNECOLOGY

## 2021-09-07 PROCEDURE — 59514 CESAREAN DELIVERY ONLY: CPT | Mod: AS | Performed by: ADVANCED PRACTICE MIDWIFE

## 2021-09-07 PROCEDURE — 250N000011 HC RX IP 250 OP 636: Performed by: ANESTHESIOLOGY

## 2021-09-07 PROCEDURE — 272N000001 HC OR GENERAL SUPPLY STERILE: Performed by: OBSTETRICS & GYNECOLOGY

## 2021-09-07 PROCEDURE — 250N000013 HC RX MED GY IP 250 OP 250 PS 637

## 2021-09-07 PROCEDURE — 250N000011 HC RX IP 250 OP 636: Performed by: ADVANCED PRACTICE MIDWIFE

## 2021-09-07 PROCEDURE — 59510 CESAREAN DELIVERY: CPT | Performed by: OBSTETRICS & GYNECOLOGY

## 2021-09-07 PROCEDURE — 120N000012 HC R&B POSTPARTUM

## 2021-09-07 PROCEDURE — 250N000009 HC RX 250: Performed by: NURSE ANESTHETIST, CERTIFIED REGISTERED

## 2021-09-07 PROCEDURE — 250N000013 HC RX MED GY IP 250 OP 250 PS 637: Performed by: OBSTETRICS & GYNECOLOGY

## 2021-09-07 RX ORDER — SODIUM CHLORIDE, SODIUM LACTATE, POTASSIUM CHLORIDE, CALCIUM CHLORIDE 600; 310; 30; 20 MG/100ML; MG/100ML; MG/100ML; MG/100ML
INJECTION, SOLUTION INTRAVENOUS CONTINUOUS
Status: DISCONTINUED | OUTPATIENT
Start: 2021-09-07 | End: 2021-09-07 | Stop reason: HOSPADM

## 2021-09-07 RX ORDER — AMOXICILLIN 250 MG
2 CAPSULE ORAL 2 TIMES DAILY
Status: DISCONTINUED | OUTPATIENT
Start: 2021-09-07 | End: 2021-09-09 | Stop reason: HOSPADM

## 2021-09-07 RX ORDER — DIPHENHYDRAMINE HYDROCHLORIDE 50 MG/ML
12.5 INJECTION INTRAMUSCULAR; INTRAVENOUS EVERY 6 HOURS PRN
Status: DISCONTINUED | OUTPATIENT
Start: 2021-09-07 | End: 2021-09-09 | Stop reason: HOSPADM

## 2021-09-07 RX ORDER — AMOXICILLIN 250 MG
1 CAPSULE ORAL 2 TIMES DAILY
Status: DISCONTINUED | OUTPATIENT
Start: 2021-09-07 | End: 2021-09-09 | Stop reason: HOSPADM

## 2021-09-07 RX ORDER — ONDANSETRON 4 MG/1
4 TABLET, ORALLY DISINTEGRATING ORAL EVERY 6 HOURS PRN
Status: DISCONTINUED | OUTPATIENT
Start: 2021-09-07 | End: 2021-09-09 | Stop reason: HOSPADM

## 2021-09-07 RX ORDER — NALOXONE HYDROCHLORIDE 0.4 MG/ML
0.4 INJECTION, SOLUTION INTRAMUSCULAR; INTRAVENOUS; SUBCUTANEOUS
Status: DISCONTINUED | OUTPATIENT
Start: 2021-09-07 | End: 2021-09-09 | Stop reason: HOSPADM

## 2021-09-07 RX ORDER — DIPHENHYDRAMINE HCL 25 MG
25 CAPSULE ORAL EVERY 6 HOURS PRN
Status: DISCONTINUED | OUTPATIENT
Start: 2021-09-07 | End: 2021-09-09 | Stop reason: HOSPADM

## 2021-09-07 RX ORDER — DIPHENHYDRAMINE HYDROCHLORIDE 50 MG/ML
25 INJECTION INTRAMUSCULAR; INTRAVENOUS EVERY 6 HOURS PRN
Status: DISCONTINUED | OUTPATIENT
Start: 2021-09-07 | End: 2021-09-09 | Stop reason: HOSPADM

## 2021-09-07 RX ORDER — OXYTOCIN 10 [USP'U]/ML
10 INJECTION, SOLUTION INTRAMUSCULAR; INTRAVENOUS
Status: DISCONTINUED | OUTPATIENT
Start: 2021-09-07 | End: 2021-09-09 | Stop reason: HOSPADM

## 2021-09-07 RX ORDER — BISACODYL 10 MG
10 SUPPOSITORY, RECTAL RECTAL DAILY PRN
Status: DISCONTINUED | OUTPATIENT
Start: 2021-09-09 | End: 2021-09-09 | Stop reason: HOSPADM

## 2021-09-07 RX ORDER — MORPHINE SULFATE 1 MG/ML
INJECTION, SOLUTION EPIDURAL; INTRATHECAL; INTRAVENOUS PRN
Status: DISCONTINUED | OUTPATIENT
Start: 2021-09-07 | End: 2021-09-07

## 2021-09-07 RX ORDER — AZITHROMYCIN 500 MG/1
500 INJECTION, POWDER, LYOPHILIZED, FOR SOLUTION INTRAVENOUS
Status: COMPLETED | OUTPATIENT
Start: 2021-09-07 | End: 2021-09-07

## 2021-09-07 RX ORDER — ACETAMINOPHEN 325 MG/1
975 TABLET ORAL ONCE
Status: COMPLETED | OUTPATIENT
Start: 2021-09-07 | End: 2021-09-07

## 2021-09-07 RX ORDER — AZITHROMYCIN 500 MG/1
INJECTION, POWDER, LYOPHILIZED, FOR SOLUTION INTRAVENOUS
Status: DISCONTINUED
Start: 2021-09-07 | End: 2021-09-07 | Stop reason: HOSPADM

## 2021-09-07 RX ORDER — METOCLOPRAMIDE HYDROCHLORIDE 5 MG/ML
10 INJECTION INTRAMUSCULAR; INTRAVENOUS EVERY 6 HOURS PRN
Status: DISCONTINUED | OUTPATIENT
Start: 2021-09-07 | End: 2021-09-09 | Stop reason: HOSPADM

## 2021-09-07 RX ORDER — ONDANSETRON 2 MG/ML
INJECTION INTRAMUSCULAR; INTRAVENOUS PRN
Status: DISCONTINUED | OUTPATIENT
Start: 2021-09-07 | End: 2021-09-07

## 2021-09-07 RX ORDER — MISOPROSTOL 200 UG/1
400 TABLET ORAL
Status: DISCONTINUED | OUTPATIENT
Start: 2021-09-07 | End: 2021-09-09 | Stop reason: HOSPADM

## 2021-09-07 RX ORDER — PROCHLORPERAZINE 25 MG
25 SUPPOSITORY, RECTAL RECTAL EVERY 12 HOURS PRN
Status: DISCONTINUED | OUTPATIENT
Start: 2021-09-07 | End: 2021-09-09 | Stop reason: HOSPADM

## 2021-09-07 RX ORDER — FENTANYL CITRATE 50 UG/ML
INJECTION, SOLUTION INTRAMUSCULAR; INTRAVENOUS PRN
Status: DISCONTINUED | OUTPATIENT
Start: 2021-09-07 | End: 2021-09-07

## 2021-09-07 RX ORDER — DEXTROSE, SODIUM CHLORIDE, SODIUM LACTATE, POTASSIUM CHLORIDE, AND CALCIUM CHLORIDE 5; .6; .31; .03; .02 G/100ML; G/100ML; G/100ML; G/100ML; G/100ML
INJECTION, SOLUTION INTRAVENOUS CONTINUOUS
Status: DISCONTINUED | OUTPATIENT
Start: 2021-09-07 | End: 2021-09-09 | Stop reason: HOSPADM

## 2021-09-07 RX ORDER — MISOPROSTOL 200 UG/1
800 TABLET ORAL
Status: DISCONTINUED | OUTPATIENT
Start: 2021-09-07 | End: 2021-09-09 | Stop reason: HOSPADM

## 2021-09-07 RX ORDER — HYDROMORPHONE HYDROCHLORIDE 1 MG/ML
.3-.5 INJECTION, SOLUTION INTRAMUSCULAR; INTRAVENOUS; SUBCUTANEOUS EVERY 30 MIN PRN
Status: DISCONTINUED | OUTPATIENT
Start: 2021-09-07 | End: 2021-09-09 | Stop reason: HOSPADM

## 2021-09-07 RX ORDER — LIDOCAINE 40 MG/G
CREAM TOPICAL
Status: DISCONTINUED | OUTPATIENT
Start: 2021-09-07 | End: 2021-09-07 | Stop reason: HOSPADM

## 2021-09-07 RX ORDER — SIMETHICONE 80 MG
80 TABLET,CHEWABLE ORAL 4 TIMES DAILY PRN
Status: DISCONTINUED | OUTPATIENT
Start: 2021-09-07 | End: 2021-09-09 | Stop reason: HOSPADM

## 2021-09-07 RX ORDER — CARBOPROST TROMETHAMINE 250 UG/ML
250 INJECTION, SOLUTION INTRAMUSCULAR
Status: DISCONTINUED | OUTPATIENT
Start: 2021-09-07 | End: 2021-09-07 | Stop reason: HOSPADM

## 2021-09-07 RX ORDER — ACETAMINOPHEN 325 MG/1
650 TABLET ORAL EVERY 4 HOURS PRN
Status: DISCONTINUED | OUTPATIENT
Start: 2021-09-10 | End: 2021-09-09 | Stop reason: HOSPADM

## 2021-09-07 RX ORDER — OXYTOCIN 10 [USP'U]/ML
10 INJECTION, SOLUTION INTRAMUSCULAR; INTRAVENOUS
Status: DISCONTINUED | OUTPATIENT
Start: 2021-09-07 | End: 2021-09-07 | Stop reason: HOSPADM

## 2021-09-07 RX ORDER — ONDANSETRON 2 MG/ML
4 INJECTION INTRAMUSCULAR; INTRAVENOUS EVERY 4 HOURS PRN
Status: DISCONTINUED | OUTPATIENT
Start: 2021-09-07 | End: 2021-09-09 | Stop reason: HOSPADM

## 2021-09-07 RX ORDER — CEFAZOLIN SODIUM 2 G/100ML
2 INJECTION, SOLUTION INTRAVENOUS SEE ADMIN INSTRUCTIONS
Status: DISCONTINUED | OUTPATIENT
Start: 2021-09-07 | End: 2021-09-07 | Stop reason: HOSPADM

## 2021-09-07 RX ORDER — CITRIC ACID/SODIUM CITRATE 334-500MG
SOLUTION, ORAL ORAL
Status: COMPLETED
Start: 2021-09-07 | End: 2021-09-07

## 2021-09-07 RX ORDER — CITRIC ACID/SODIUM CITRATE 334-500MG
30 SOLUTION, ORAL ORAL
Status: COMPLETED | OUTPATIENT
Start: 2021-09-07 | End: 2021-09-07

## 2021-09-07 RX ORDER — MISOPROSTOL 200 UG/1
800 TABLET ORAL
Status: DISCONTINUED | OUTPATIENT
Start: 2021-09-07 | End: 2021-09-07 | Stop reason: HOSPADM

## 2021-09-07 RX ORDER — SODIUM CHLORIDE, SODIUM LACTATE, POTASSIUM CHLORIDE, CALCIUM CHLORIDE 600; 310; 30; 20 MG/100ML; MG/100ML; MG/100ML; MG/100ML
INJECTION, SOLUTION INTRAVENOUS CONTINUOUS PRN
Status: DISCONTINUED | OUTPATIENT
Start: 2021-09-07 | End: 2021-09-07

## 2021-09-07 RX ORDER — ONDANSETRON 2 MG/ML
4 INJECTION INTRAMUSCULAR; INTRAVENOUS EVERY 6 HOURS PRN
Status: DISCONTINUED | OUTPATIENT
Start: 2021-09-07 | End: 2021-09-09 | Stop reason: HOSPADM

## 2021-09-07 RX ORDER — ACETAMINOPHEN 325 MG/1
975 TABLET ORAL EVERY 6 HOURS
Status: DISCONTINUED | OUTPATIENT
Start: 2021-09-07 | End: 2021-09-09 | Stop reason: HOSPADM

## 2021-09-07 RX ORDER — TRANEXAMIC ACID 10 MG/ML
1 INJECTION, SOLUTION INTRAVENOUS EVERY 30 MIN PRN
Status: DISCONTINUED | OUTPATIENT
Start: 2021-09-07 | End: 2021-09-09 | Stop reason: HOSPADM

## 2021-09-07 RX ORDER — IBUPROFEN 400 MG/1
800 TABLET, FILM COATED ORAL EVERY 6 HOURS PRN
Status: DISCONTINUED | OUTPATIENT
Start: 2021-09-07 | End: 2021-09-09 | Stop reason: HOSPADM

## 2021-09-07 RX ORDER — CARBOPROST TROMETHAMINE 250 UG/ML
250 INJECTION, SOLUTION INTRAMUSCULAR
Status: DISCONTINUED | OUTPATIENT
Start: 2021-09-07 | End: 2021-09-09 | Stop reason: HOSPADM

## 2021-09-07 RX ORDER — PROCHLORPERAZINE MALEATE 10 MG
10 TABLET ORAL EVERY 6 HOURS PRN
Status: DISCONTINUED | OUTPATIENT
Start: 2021-09-07 | End: 2021-09-09 | Stop reason: HOSPADM

## 2021-09-07 RX ORDER — ACETAMINOPHEN 325 MG/1
TABLET ORAL
Status: COMPLETED
Start: 2021-09-07 | End: 2021-09-07

## 2021-09-07 RX ORDER — NALOXONE HYDROCHLORIDE 0.4 MG/ML
0.2 INJECTION, SOLUTION INTRAMUSCULAR; INTRAVENOUS; SUBCUTANEOUS
Status: DISCONTINUED | OUTPATIENT
Start: 2021-09-07 | End: 2021-09-09 | Stop reason: HOSPADM

## 2021-09-07 RX ORDER — OXYTOCIN/0.9 % SODIUM CHLORIDE 30/500 ML
100-340 PLASTIC BAG, INJECTION (ML) INTRAVENOUS CONTINUOUS PRN
Status: DISCONTINUED | OUTPATIENT
Start: 2021-09-07 | End: 2021-09-09 | Stop reason: HOSPADM

## 2021-09-07 RX ORDER — MISOPROSTOL 200 UG/1
400 TABLET ORAL
Status: DISCONTINUED | OUTPATIENT
Start: 2021-09-07 | End: 2021-09-07 | Stop reason: HOSPADM

## 2021-09-07 RX ORDER — METOCLOPRAMIDE 10 MG/1
10 TABLET ORAL EVERY 6 HOURS PRN
Status: DISCONTINUED | OUTPATIENT
Start: 2021-09-07 | End: 2021-09-09 | Stop reason: HOSPADM

## 2021-09-07 RX ORDER — CEFAZOLIN SODIUM 2 G/100ML
2 INJECTION, SOLUTION INTRAVENOUS
Status: DISCONTINUED | OUTPATIENT
Start: 2021-09-07 | End: 2021-09-07 | Stop reason: HOSPADM

## 2021-09-07 RX ORDER — SCOLOPAMINE TRANSDERMAL SYSTEM 1 MG/1
1 PATCH, EXTENDED RELEASE TRANSDERMAL ONCE
Status: DISCONTINUED | OUTPATIENT
Start: 2021-09-07 | End: 2021-09-09 | Stop reason: HOSPADM

## 2021-09-07 RX ORDER — OXYTOCIN/0.9 % SODIUM CHLORIDE 30/500 ML
340 PLASTIC BAG, INJECTION (ML) INTRAVENOUS CONTINUOUS PRN
Status: DISCONTINUED | OUTPATIENT
Start: 2021-09-07 | End: 2021-09-09 | Stop reason: HOSPADM

## 2021-09-07 RX ORDER — KETOROLAC TROMETHAMINE 30 MG/ML
30 INJECTION, SOLUTION INTRAMUSCULAR; INTRAVENOUS EVERY 6 HOURS
Status: DISCONTINUED | OUTPATIENT
Start: 2021-09-07 | End: 2021-09-07 | Stop reason: ALTCHOICE

## 2021-09-07 RX ORDER — METHYLERGONOVINE MALEATE 0.2 MG/ML
200 INJECTION INTRAVENOUS
Status: DISCONTINUED | OUTPATIENT
Start: 2021-09-07 | End: 2021-09-09 | Stop reason: HOSPADM

## 2021-09-07 RX ORDER — MODIFIED LANOLIN
OINTMENT (GRAM) TOPICAL
Status: DISCONTINUED | OUTPATIENT
Start: 2021-09-07 | End: 2021-09-09 | Stop reason: HOSPADM

## 2021-09-07 RX ORDER — HYDROCORTISONE 2.5 %
CREAM (GRAM) TOPICAL 3 TIMES DAILY PRN
Status: DISCONTINUED | OUTPATIENT
Start: 2021-09-07 | End: 2021-09-09 | Stop reason: HOSPADM

## 2021-09-07 RX ORDER — OXYTOCIN/0.9 % SODIUM CHLORIDE 30/500 ML
340 PLASTIC BAG, INJECTION (ML) INTRAVENOUS CONTINUOUS PRN
Status: DISCONTINUED | OUTPATIENT
Start: 2021-09-07 | End: 2021-09-07 | Stop reason: HOSPADM

## 2021-09-07 RX ORDER — LIDOCAINE 40 MG/G
CREAM TOPICAL
Status: DISCONTINUED | OUTPATIENT
Start: 2021-09-07 | End: 2021-09-09 | Stop reason: HOSPADM

## 2021-09-07 RX ORDER — METHYLERGONOVINE MALEATE 0.2 MG/ML
200 INJECTION INTRAVENOUS
Status: DISCONTINUED | OUTPATIENT
Start: 2021-09-07 | End: 2021-09-07 | Stop reason: HOSPADM

## 2021-09-07 RX ORDER — PROPOFOL 10 MG/ML
INJECTION, EMULSION INTRAVENOUS PRN
Status: DISCONTINUED | OUTPATIENT
Start: 2021-09-07 | End: 2021-09-07

## 2021-09-07 RX ORDER — IBUPROFEN 400 MG/1
800 TABLET, FILM COATED ORAL EVERY 6 HOURS PRN
Status: DISCONTINUED | OUTPATIENT
Start: 2021-09-08 | End: 2021-09-07

## 2021-09-07 RX ORDER — OXYCODONE HYDROCHLORIDE 5 MG/1
5 TABLET ORAL EVERY 4 HOURS PRN
Status: DISCONTINUED | OUTPATIENT
Start: 2021-09-07 | End: 2021-09-08

## 2021-09-07 RX ORDER — CEFAZOLIN SODIUM 2 G/100ML
INJECTION, SOLUTION INTRAVENOUS
Status: DISCONTINUED
Start: 2021-09-07 | End: 2021-09-07 | Stop reason: HOSPADM

## 2021-09-07 RX ORDER — TRANEXAMIC ACID 10 MG/ML
1 INJECTION, SOLUTION INTRAVENOUS EVERY 30 MIN PRN
Status: DISCONTINUED | OUTPATIENT
Start: 2021-09-07 | End: 2021-09-07 | Stop reason: HOSPADM

## 2021-09-07 RX ORDER — LIDOCAINE HCL/EPINEPHRINE/PF 2%-1:200K
VIAL (ML) INJECTION PRN
Status: DISCONTINUED | OUTPATIENT
Start: 2021-09-07 | End: 2021-09-07

## 2021-09-07 RX ADMIN — MORPHINE SULFATE 1 MG: 1 INJECTION, SOLUTION EPIDURAL; INTRATHECAL; INTRAVENOUS at 06:43

## 2021-09-07 RX ADMIN — PROPOFOL 10 MG: 10 INJECTION, EMULSION INTRAVENOUS at 06:51

## 2021-09-07 RX ADMIN — CEFAZOLIN SODIUM 2 G: 2 INJECTION, SOLUTION INTRAVENOUS at 06:13

## 2021-09-07 RX ADMIN — SODIUM CHLORIDE, SODIUM LACTATE, POTASSIUM CHLORIDE, CALCIUM CHLORIDE AND DEXTROSE MONOHYDRATE: 5; 600; 310; 30; 20 INJECTION, SOLUTION INTRAVENOUS at 10:33

## 2021-09-07 RX ADMIN — ACETAMINOPHEN 975 MG: 325 TABLET, FILM COATED ORAL at 12:16

## 2021-09-07 RX ADMIN — Medication 12 ML/HR: at 02:13

## 2021-09-07 RX ADMIN — SODIUM CHLORIDE, POTASSIUM CHLORIDE, SODIUM LACTATE AND CALCIUM CHLORIDE: 600; 310; 30; 20 INJECTION, SOLUTION INTRAVENOUS at 06:13

## 2021-09-07 RX ADMIN — ONDANSETRON 4 MG: 2 INJECTION INTRAMUSCULAR; INTRAVENOUS at 06:21

## 2021-09-07 RX ADMIN — LIDOCAINE HYDROCHLORIDE,EPINEPHRINE BITARTRATE 3 ML: 20; .005 INJECTION, SOLUTION EPIDURAL; INFILTRATION; INTRACAUDAL; PERINEURAL at 06:43

## 2021-09-07 RX ADMIN — Medication 340 ML/HR: at 06:38

## 2021-09-07 RX ADMIN — ACETAMINOPHEN 975 MG: 325 TABLET, FILM COATED ORAL at 05:55

## 2021-09-07 RX ADMIN — ACETAMINOPHEN 975 MG: 325 TABLET, FILM COATED ORAL at 19:26

## 2021-09-07 RX ADMIN — KETOROLAC TROMETHAMINE 30 MG: 30 INJECTION, SOLUTION INTRAMUSCULAR; INTRAVENOUS at 13:15

## 2021-09-07 RX ADMIN — Medication 30 ML: at 05:55

## 2021-09-07 RX ADMIN — SODIUM CITRATE AND CITRIC ACID MONOHYDRATE 30 ML: 500; 334 SOLUTION ORAL at 05:55

## 2021-09-07 RX ADMIN — FENTANYL CITRATE 100 MCG: 50 INJECTION, SOLUTION INTRAMUSCULAR; INTRAVENOUS at 06:49

## 2021-09-07 RX ADMIN — LIDOCAINE HYDROCHLORIDE,EPINEPHRINE BITARTRATE 14 ML: 20; .005 INJECTION, SOLUTION EPIDURAL; INFILTRATION; INTRACAUDAL; PERINEURAL at 06:20

## 2021-09-07 RX ADMIN — IBUPROFEN 800 MG: 400 TABLET ORAL at 19:26

## 2021-09-07 RX ADMIN — ONDANSETRON 4 MG: 2 INJECTION INTRAMUSCULAR; INTRAVENOUS at 06:40

## 2021-09-07 RX ADMIN — ACETAMINOPHEN 975 MG: 325 TABLET ORAL at 05:55

## 2021-09-07 RX ADMIN — PHENYLEPHRINE HYDROCHLORIDE 100 MCG: 10 INJECTION INTRAVENOUS at 06:50

## 2021-09-07 RX ADMIN — KETOROLAC TROMETHAMINE 30 MG: 30 INJECTION, SOLUTION INTRAMUSCULAR at 07:03

## 2021-09-07 RX ADMIN — AZITHROMYCIN MONOHYDRATE 500 MG: 500 INJECTION, POWDER, LYOPHILIZED, FOR SOLUTION INTRAVENOUS at 06:22

## 2021-09-07 NOTE — PLAN OF CARE
Data:Patient is a . Prenatal record reviewed.   OB History    Para Term  AB Living   1 0 0 0 0 0   SAB TAB Ectopic Multiple Live Births   0 0 0 0 0      # Outcome Date GA Lbr Jakub/2nd Weight Sex Delivery Anes PTL Lv   1 Current            .  Medical History: History reviewed. No pertinent past medical history..  Gestational age 40w0d. Vital signs per doc flowsheet. Fetal movement present. Patient reports Rule out rupture of membranes   as reason for admission. Support persons Juan J present.  Action: Report from JANIE Hester obtained at 1920. Care of patient assumed at 1920. Verbal consent for EFM, external fetal monitors applied. Admission assessment completed. Patient and support persons educated on labor process. Patient instructed to report change in fetal movement, contractions, vaginal leaking of fluid or bleeding, abdominal pain, or any concerns related to the pregnancy to her nurse/physician. Patient oriented to room, call light in reach.   Response: Flor Licea informed of labor. Plan per provider is get epidural per pts wishes and recheck SVE. Patient verbalized understanding of education and verbalized agreement with plan. Patient coping with labor and getting epidural when RN assumed care.       21 194   Vaginal Exam   Method sterile exam per CNM   Cervical Dilation (cm) 5   Provider Notification   Provider Name/Title L Vinayak    Method of Notification At Bedside   Request Evaluate in Person   Notification Reason SVE;Status Update        21   Vaginal Exam   Method sterile exam per RN  (pt feeling a lot of pressure )   Cervical Dilation (cm) 5-6        21 2147   Provider Notification   Provider Name/Title Gombola   Method of Notification At Bedside   Request Evaluate in Person   Notification Reason Pain  (epidural bolus )        21 2228   Provider Notification   Provider Name/Title WOODY Licea    Method of Notification At Bedside   Request Evaluate in Person    Notification Reason Status Update  (no cervical change, start pit, recheck sve in 2hrs, if no change IUPC)      09/07/21 0040   Vaginal Exam   Method sterile exam per RN   Cervical Dilation (cm) 7-8   Cervical Effacement %   Fetal Station -1      09/07/21 0138   Vaginal Exam   Method sterile exam per RN   Cervical Dilation (cm) 10   Cervical Effacement 100%   Fetal Station 0   Provider Notification   Provider Name/Title WOODY Licea   Method of Notification Electronic Page   Request Evaluate - Remote   Notification Reason SVE  (complete, will start pushing and call for delivery )        09/07/21 0148   Second Stage of Labor   Provider Location review of fetal monitoring strip   Second Stage of Labor Assessments 2-3 pushing efforts per contraction   Second Stage Of Labor Maternal Position semi-Wang's      09/07/21 0305   Provider Notification   Provider Name/Title WOODY Licea    Method of Notification At Bedside   Request Evaluate in Person   Notification Reason Status Update  (assisting with pushing )        09/07/21 0509   Provider Notification   Provider Name/Title Gato   Method of Notification Phone   Request Attend Delivery  (WOODY Licea calling to evaluate)      09/07/21 0527   Provider Notification   Provider Name/Title Gato    Method of Notification At Bedside   Request Evaluate in Person        09/07/21 0535   Comments   Comments consent for c/s signed         09/07/21 0556   Provider Notification   Provider Name/Title Kenny    Method of Notification At Bedside   Request Evaluate in Person     Viable baby girl born at 0614 via c/s, see delivery note.     Handoff to Elena TAVERAS, at 0715 while pt arrived in PACU

## 2021-09-07 NOTE — PROGRESS NOTES
CNM PROGRESS NOTE    SUBJECTIVE: Was feeling uncomfortable and needed epidural bolus, now comfortable again. Hoping to get some sleep. Was having pain in her back again.     OBJECTIVE:  /54   Temp 97.3  F (36.3  C) (Temporal)   Resp 18   Wt 112 kg (247 lb)   LMP 11/30/2020   BMI 42.40 kg/m      Fetal heart tones: Baseline 125  Variability: moderate  Accelerations: present  Decelerations: absent    Contractions: Pt is jeffrey every 2-5 minutes, lasting  seconds and palpates moderate  Some coupling and tripling    Cervix: 5.5/ 90% / -1, Vtx unchanged from last check per RN  ROM: clear fluid    ASSESSMENT:  IUP @ 40w0d active labor and minimal/no progress   GBS- negative  Covid neg  SROM x  11 hours  Epidural anesthesia  Suspect asynclitic or OP position    PLAN:   Start pitocin augmentation per protocol  Consider IUPC placement if no change in 2 hrs with low dose pitocin, discussed IUPC as tool to measure if adequate labor, no change since 1945, not necessary at this point but if lack of progress continues would recommend  Discussed position, continue side lying release to facilitate descent and rotation  Labor augmentation with Pitocin  reevaluate in 2-4 hours/PRN    ADALID Daily CNM

## 2021-09-07 NOTE — ANESTHESIA CARE TRANSFER NOTE
Patient: Kendra Haynes    Procedure(s):   SECTION    Diagnosis: 40 weeks gestation of pregnancy [Z3A.40]  Diagnosis Additional Information: No value filed.    Anesthesia Type:   Epidural     Note:    Oropharynx: oropharynx clear of all foreign objects  Level of Consciousness: awake  Oxygen Supplementation: room air    Independent Airway: airway patency satisfactory and stable  Dentition: dentition unchanged  Vital Signs Stable: post-procedure vital signs reviewed and stable  Report to RN Given: handoff report given  Patient transferred to: PACU    Handoff Report: Identifed the Patient, Identified the Reponsible Provider, Reviewed the pertinent medical history, Discussed the surgical course, Reviewed Intra-OP anesthesia mangement and issues during anesthesia, Set expectations for post-procedure period and Allowed opportunity for questions and acknowledgement of understanding      Vitals:  Vitals Value Taken Time   BP     Temp     Pulse     Resp     SpO2         Electronically Signed By: ADALID Quarles CRNA  2021  7:20 AM

## 2021-09-07 NOTE — PLAN OF CARE
VSS and O2 sats 98%.  Tolerating regular diet.  Incisional dressing has 2 very small marked spots drainage on dressing.  Fundus is firm and midline.  Matthews discontinued at 1230 with 500 ml urine noted.  Ambulated to bathroom and did well with SBA of 1.  Pneumo boots on.   baby with minimal assist to latch baby.

## 2021-09-07 NOTE — BRIEF OP NOTE
[facility]    Brief Operative Note    Pre-operative diagnosis: 40 weeks gestation of pregnancy [Z3A.40]. Arrest of Descent.  Post-operative diagnosis Same as pre-operative diagnosis    Procedure: Procedure(s):   SECTION--Primary   Surgeon: Surgeon(s) and Role:     * Patricia Hoskins MD - Primary     * Ebonie Licea APRN CNM - Assisting  Anesthesia: Epidural   Estimated blood loss: 750 ml  Drains: None  Specimens:   ID Type Source Tests Collected by Time Destination   A :  Cord blood Umbilical Cord OR DOCUMENTATION ONLY Patricia Hoskins MD 2021  6:49 AM    B :  Tissue Umbilical Cord SEE PROVIDERS ORDERS Patricia Hoskins MD 2021  6:49 AM      Findings:   Female infant in LOT presentation. APGARs 8, 9 Weight 8lbs 3 ounces. Normal uterus and bilateral fallopian tubes and ovaries.  Complications: None.  Dictation Reference Number: [id number]

## 2021-09-07 NOTE — PROGRESS NOTES
CNM PROGRESS NOTE    SUBJECTIVE: I was feeling a lot of pressure and felt like I needed to poop.    OBJECTIVE:  /75   Temp 99.1  F (37.3  C) (Temporal)   Resp 16   Wt 112 kg (247 lb)   LMP 2020   SpO2 97%   BMI 42.40 kg/m      Fetal heart tones: Baseline 140  Variability: moderate  Accelerations: present  Decelerations: absent    Contractions: Pt is jeffrey every 2-4 minutes, lasting 60-90 seconds and palpates moderate    Cervix: 10/ 100% / 0/+1, Vtx, caput present  ROM: clear fluid    Pitocin- 6 mu/min.    ASSESSMENT:  IUP @ 40w1d second stage labor   GBS- negative  COVID neg  SROM x 16 hours     PLAN:   Discussed pushing position and average pushing time, pushing on sides, semi fowlers, lithotomy, tug of war  Began active pushing at 0148  Labor augmentation with Pitocin-continue per protocol to achieve adequate labor  Planning skin to skin, delayed cord clamping, and Juan J to cut cord.  Anticipate     ADALID Daily CNM

## 2021-09-07 NOTE — PLAN OF CARE
Data: Kendra Haynes transferred to room 432 via wheelchair at 0915. Baby transferred via parent's arms.  Action: Receiving unit notified of transfer: Yes. Patient and family notified of room change. Report given to JANIE Tan at 0915. Belongings sent to receiving unit. Accompanied by Registered Nurse. Oriented patient to surroundings. Call light within reach. ID bands double-checked with receiving RN.  Response: Patient tolerated transfer and is stable.

## 2021-09-07 NOTE — LACTATION NOTE
Routine Lactation visit with Kendra & baby girl Jennifer. Kendra shared baby Jennifer was initially too sleepy to latch, but has since fed well. At time of visit, baby due to try to feed. LC assisted to place her at left breast in semi-reclined position, she attempted to latch, and with LC assisting to hand express some drops of colostrum, she started a nutritive suck pattern, lips flanged, feeding well through remainder of visit.    Reviewed milk supply and engorgement. General questions answered regarding pumping, when it's helpful and necessary, general recommendation to wait to start pumping until breastfeeding is well established. Discussed introducing a bottle and recommendation to wait for bottle introduction for 3-4 weeks unless baby needs to supplement for medical reasons. Breastfeeding section reviewed in Your Guide to [facility]. Discussed typical  feeding patterns, cluster feeding, and ways to wake a sleepy baby for feedings.    Feeding plan: Recommend unlimited, frequent breast feedings: At least 8 - 12 times every 24 hours. Avoid pacifiers and supplementation with formula unless medically indicated. Encouraged use of feeding log and to record feedings, and void/stool patterns. Kendra has a pump for home use.  Encouraged to call with needs, will revisit as needed. Kendra very appreciative of visit.    Twila Vázquez, RN-C, IBCLC, MNN, PHN, BSN

## 2021-09-07 NOTE — PLAN OF CARE
Patient requested epidural for labor pain. Pt and spouse educated on medication indication, procedure and risks vs benefits. In agreement to proceed. 1L bolus given. Dr Cox paged for orders. Ropivacaine handed off to [facility]. Pt tolerated procedure well. Report to Matias TAVERAS RN.

## 2021-09-07 NOTE — PROGRESS NOTES
CNM PROGRESS NOTE    SUBJECTIVE:  Kendra is breathing hard through contractions, in good control.  Juan J supportive at bedside. Had fentanyl at 1825 with good relief. Still having pain in her back, wondering if baby is still face up.    OBJECTIVE:  /74   Temp 97.3  F (36.3  C) (Temporal)   Resp 18   Wt 112 kg (247 lb)   LMP 11/30/2020   BMI 42.40 kg/m      Fetal heart tones: Baseline 125  Variability: moderate  Accelerations: present  Decelerations: absent    Contractions: Pt is jeffrey every 1.5-4 minutes, lasting  seconds and palpates moderate    Cervix: 4/ 80-90/ -2, Vtx  ROM: clear fluid    ASSESSMENT:  IUP @ 40w0d active labor, good progress  GBS- negative  COVID neg  SROM x  7 hrs 45 mins    PLAN:   Epidural in process Anesthesia in room at 1913  comfort measures prn  Discussed side lying release, use of peanut ball once comfortable for suspect OP position  Discussed allred versus straight cath  reevaluate in 2-4 hours/PRN or once she is comfortable    Continuous labor support [phone] prior to epidural placement     ADALID Daily CNM

## 2021-09-07 NOTE — PROGRESS NOTES
CNM PROGRESS NOTE    SUBJECTIVE:  I am feeling it in my back now, I am getting tired. I really do not want a vacuum or c section. I want to keep pushing.    OBJECTIVE:  /66   Temp 99  F (37.2  C) (Temporal)   Resp 16   Wt 112 kg (247 lb)   LMP 2020   SpO2 97%   BMI 42.40 kg/m      Fetal heart tones: Baseline 140  Variability: moderate  Accelerations: present  Decelerations: absent    Contractions: Pt is jeffrey every 2-3 minutes    Cervix: 10/ 100% / +1, Vtx  ROM: clear fluid    Pitocin- 8 mu/min.    ASSESSMENT:  IUP @ 40w1d   Prolonged second stage  GBS- negative  COVID neg  SROM x  17 hours  Epidural in place  Pitocin augmentation    PLAN:   Discussed vacuum assessment at this point would be appropriate and would require assessment by MD, if unable to do vacuum would proceed with  if recommended; patient is declining operative delivery at this time, we reviewed if maternal or fetal well being changes I would recommend proceeding with operative delivery  Continue pitocin per protocol to maintain adequate contraction pattern  Will update MD if operative delivery assessment is needed or per patient request  Anticipate ADALID Howell CNM

## 2021-09-07 NOTE — PROGRESS NOTES
CNM PROGRESS NOTE    At 03:10 of pushing patient is feeling uncomfortable and exhausted. Okay with proceeding toward operative delivery.  Paged on call MD at 0506 to request assessment for operative delivery. Discussed with patient again, currently with fetal head at 0/+1 and caput present may be counseled that safest option is  delivery not vacuum.  0577 Dr. Hoskins at bedside, counseled that  would be the safest option.    Transfer of care to MD management  CNM to assist    ADALID Daily CNM

## 2021-09-07 NOTE — ANESTHESIA POSTPROCEDURE EVALUATION
Patient: Kendra Haynes    Procedure(s):   SECTION    Diagnosis:40 weeks gestation of pregnancy [Z3A.40]  Diagnosis Additional Information: No value filed.    Anesthesia Type:  Epidural    Note:     Postop Pain Control: Uneventful            Sign Out: Well controlled pain   PONV: No   Neuro/Psych: Uneventful            Sign Out: Acceptable/Baseline neuro status   Airway/Respiratory: Uneventful            Sign Out: Acceptable/Baseline resp. status   CV/Hemodynamics: Uneventful            Sign Out: Acceptable CV status; No obvious hypovolemia; No obvious fluid overload   Other NRE: NONE   DID A NON-ROUTINE EVENT OCCUR? No           Last vitals:  Vitals Value Taken Time   /67 21 0730   Temp     Pulse 86 21 0738   Resp 15 21 0738   SpO2 97 % 2138   Vitals shown include unvalidated device data.    Electronically Signed By: Yaniv Cox MD  2021  7:38 AM

## 2021-09-07 NOTE — PROGRESS NOTES
MD Progress Note    Kendra was seen on the request of BARB Licea for an assessment for an assisted delivery. Kendra was pushing for 3 hours and 10 minutes with minimal progression. She presented in active labor but was augmented with oxytocin. On my assessment the fetal station is at the zero station with caput at +1. No fetal head descent with pushing. Kendra was counseled that she is not a candidate for a vacuum assisted vaginal delivery. I counseled her that she needs to be delivered via a  section. The risks of the procedure were discussed. She is willing to accept blood if medically necessary.    On call to the OR    Patricia Hoskins MD

## 2021-09-07 NOTE — ANESTHESIA PROCEDURE NOTES
Epidural catheter Procedure Note  Pre-Procedure   Staff -        Anesthesiologist:  Yaniv Cox MD       Performed By: Anesthesiologist       Location: OB       Pre-Anesthestic Checklist: patient identified, risks and benefits discussed, informed consent, monitors and equipment checked, pre-op evaluation and at physician/surgeon's request  Timeout:       Correct Patient: Yes        Correct Procedure: Yes        Correct Site: Yes        Correct Position: Yes   Procedure Documentation  Procedure: epidural catheter       Patient Position: sitting       Skin prep: Chloraprep      Local skin infiltrated with mL of 1% lidocaine.        Insertion Site: L3-4. (midline approach).       Technique: LORT saline        ANDRAE at 9 cm.       Needle Type: ToCardiovascular Simulationy needle       Needle Gauge: 17.        Needle Length (Inches): 5        Catheter: 18 G.         Catheter threaded easily.         Threaded 14 cm at skin.        # of attempts: 1 and  # of redirects:     Assessment/Narrative         Paresthesias: No.     Test dose of 3 mL at.         Test dose negative, 3 minutes after injection, for signs of intravascular, subdural, or intrathecal injection.       Insertion/Infusion Method: LORT saline       Aspiration negative for Heme or CSF via Epidural Catheter.    Medication(s) Administered   0.2% Ropivacaine (Epidural), 10 mL  Medication Administration Time: 9/6/2021 7:25 PM    Comments:  Risks, benefits, alternatives of epidural analgesia discussed with the patient who agrees to proceed.  After a procedural timeout confirming the correct patient and details of the procedure, 1% lidocaine was injected superficially over the skin for topical anesthesia.  A 17 G Tuohy needle was advanced at the above lumbar interspace until contact was felt with ligamentum flavum.  Loss of resistance to saline was encountered at above noted depth.  3 ml of saline was injected to expand the epidural space.  The epidural catheter was then easily  threaded to the depth noted above.  Paresthesias were as noted above.Ropivacaine 10 ml bolus via epidural catheter administered at end of procedure.  Patient tolerated well.

## 2021-09-07 NOTE — ANESTHESIA PREPROCEDURE EVALUATION
Anesthesia Pre-Procedure Evaluation    Patient: Kendra Haynes   MRN: [id number] : 1995        Preoperative Diagnosis: * No pre-op diagnosis entered *   Procedure : * No procedures listed *     History reviewed. No pertinent past medical history.   Past Surgical History:   Procedure Laterality Date     TONSILLECTOMY & ADENOIDECTOMY Bilateral 1998      Allergies   Allergen Reactions     Clindamycin Hives      Social History     Tobacco Use     Smoking status: Never Smoker     Smokeless tobacco: Never Used   Substance Use Topics     Alcohol use: Not Currently     Comment: 0-1 drink every 3-4 months      Wt Readings from Last 1 Encounters:   21 112 kg (247 lb)        Anesthesia Evaluation            ROS/MED HX  ENT/Pulmonary:    (-) asthma   Neurologic:  - neg neurologic ROS     Cardiovascular:    (-) PIH   METS/Exercise Tolerance:     Hematologic:       Musculoskeletal:       GI/Hepatic:     (+) GERD,     Renal/Genitourinary:       Endo:       Psychiatric/Substance Use:       Infectious Disease:       Malignancy:       Other:            Physical Exam    Airway        Mallampati: II   TM distance: > 3 FB   Neck ROM: full     Respiratory Devices and Support         Dental  no notable dental history         Cardiovascular   cardiovascular exam normal          Pulmonary   pulmonary exam normal                OUTSIDE LABS:  CBC:   Lab Results   Component Value Date    WBC 12.4 (H) 2021    WBC 12.8 (H) 2021    HGB 12.0 2021    HGB 12.0 2021    HCT 36.2 2021    HCT 35.7 2021     2021     2021     BMP:   Lab Results   Component Value Date     2021     2019    POTASSIUM 3.7 2021    POTASSIUM 3.8 2019    CHLORIDE 109 2021    CHLORIDE 106 2019    CO2 23 2021    CO2 26 2019    BUN 8 2021    BUN 11 2019    CR 0.66 2021    CR 0.68 2019    GLC 77 2021    GLC 87  12/07/2019     COAGS: No results found for: PTT, INR, FIBR  POC: No results found for: BGM, HCG, HCGS  HEPATIC:   Lab Results   Component Value Date    ALBUMIN 2.7 (L) 09/06/2021    PROTTOTAL 6.5 (L) 09/06/2021    ALT 18 09/06/2021    AST 15 09/06/2021    ALKPHOS 203 (H) 09/06/2021    BILITOTAL 0.3 09/06/2021     OTHER:   Lab Results   Component Value Date    A1C 4.7 02/15/2021    FREDDY 9.0 09/06/2021    TSH 2.25 12/07/2019       Anesthesia Plan    ASA Status:  2      Anesthesia Type: Epidural.              Consents    Anesthesia Plan(s) and associated risks, benefits, and realistic alternatives discussed. Questions answered and patient/representative(s) expressed understanding.     - Discussed with:  Patient         Postoperative Care            Comments:    Orders to manage the epidural infusion have been entered, and through coordination with the nurse, we will continute to manage and monitor the patient's labor epidural.  We will continuously be available to adjust as needed thruout the entire L&D process.             Yaniv Cox MD

## 2021-09-08 LAB — HGB BLD-MCNC: 9.8 G/DL (ref 11.7–15.7)

## 2021-09-08 PROCEDURE — 250N000013 HC RX MED GY IP 250 OP 250 PS 637: Performed by: OBSTETRICS & GYNECOLOGY

## 2021-09-08 PROCEDURE — 85018 HEMOGLOBIN: CPT | Performed by: OBSTETRICS & GYNECOLOGY

## 2021-09-08 PROCEDURE — 250N000011 HC RX IP 250 OP 636: Performed by: OBSTETRICS & GYNECOLOGY

## 2021-09-08 PROCEDURE — 36415 COLL VENOUS BLD VENIPUNCTURE: CPT | Performed by: OBSTETRICS & GYNECOLOGY

## 2021-09-08 PROCEDURE — 120N000012 HC R&B POSTPARTUM

## 2021-09-08 RX ORDER — OXYCODONE HYDROCHLORIDE 5 MG/1
5-10 TABLET ORAL
Status: DISCONTINUED | OUTPATIENT
Start: 2021-09-08 | End: 2021-09-09 | Stop reason: HOSPADM

## 2021-09-08 RX ADMIN — ACETAMINOPHEN 975 MG: 325 TABLET, FILM COATED ORAL at 12:40

## 2021-09-08 RX ADMIN — SENNOSIDES AND DOCUSATE SODIUM 2 TABLET: 8.6; 5 TABLET ORAL at 19:59

## 2021-09-08 RX ADMIN — OXYCODONE HYDROCHLORIDE 5 MG: 5 TABLET ORAL at 07:32

## 2021-09-08 RX ADMIN — OXYCODONE HYDROCHLORIDE 5 MG: 5 TABLET ORAL at 19:59

## 2021-09-08 RX ADMIN — OXYCODONE HYDROCHLORIDE 5 MG: 5 TABLET ORAL at 23:19

## 2021-09-08 RX ADMIN — IBUPROFEN 800 MG: 400 TABLET ORAL at 01:11

## 2021-09-08 RX ADMIN — IBUPROFEN 800 MG: 400 TABLET ORAL at 12:40

## 2021-09-08 RX ADMIN — SENNOSIDES AND DOCUSATE SODIUM 1 TABLET: 8.6; 5 TABLET ORAL at 07:31

## 2021-09-08 RX ADMIN — OXYCODONE HYDROCHLORIDE 5 MG: 5 TABLET ORAL at 11:49

## 2021-09-08 RX ADMIN — ENOXAPARIN SODIUM 40 MG: 40 INJECTION SUBCUTANEOUS at 11:49

## 2021-09-08 RX ADMIN — OXYCODONE HYDROCHLORIDE 5 MG: 5 TABLET ORAL at 15:57

## 2021-09-08 RX ADMIN — IBUPROFEN 800 MG: 400 TABLET ORAL at 18:48

## 2021-09-08 RX ADMIN — IBUPROFEN 800 MG: 400 TABLET ORAL at 06:31

## 2021-09-08 RX ADMIN — ACETAMINOPHEN 975 MG: 325 TABLET, FILM COATED ORAL at 06:31

## 2021-09-08 RX ADMIN — ACETAMINOPHEN 975 MG: 325 TABLET, FILM COATED ORAL at 18:51

## 2021-09-08 RX ADMIN — ACETAMINOPHEN 975 MG: 325 TABLET, FILM COATED ORAL at 01:11

## 2021-09-08 NOTE — PROGRESS NOTES
BARB Courtesy Round PPD #1    Doing well overall. Having more incisional pain today than yesterday. Has had to take Oxycodone, Tylenol and Ibuprofen. Ambulating in room okay. Gets up to the chair to breastfeed, stating easier to position baby sitting in the chair vs in bed.    Breastfeeding is going well. Lactation has rounded and the nurses have been helpful.     Feels okay with having had a C/S, not what she wanted but pushed over 4 hours.   Discussed TOLAC may be an option for next pregnancy.     Anticipate discharge on PPD#3.     Sita CORDOBA CNM

## 2021-09-08 NOTE — PROVIDER NOTIFICATION
Spoke with Dr. Infante about loss of IV access. Ok to discontinue IV. Switch to ibuprofen per orders. TORB.

## 2021-09-08 NOTE — PLAN OF CARE
Vital signs stable. Postpartum assessment WDL. Dried drainage to incision- plans to shower this morning and then remove dressing. Pain controlled with tylenol and ibuprofen. Patient ambulating independently. Breastfeeding on cue with minimal assist. Patient and infant bonding well. Will continue with current plan of care.

## 2021-09-08 NOTE — OP NOTE
Procedure Date: 2021    PREOPERATIVE DIAGNOSES:     1.  Term intrauterine pregnancy at 40+1 weeks' gestational age.  2.  Arrest of descent after 3+ hours of pushing.  3.  Cephalopelvic disproportion.    POSTOPERATIVE DIAGNOSES:    1.  Term intrauterine pregnancy at 40+1 weeks' gestational age.  2.  Arrest of descent after 3+ hours of pushing.  3.  Cephalopelvic disproportion.    PROCEDURE PERFORMED:  Primary low transverse  section via Pfannenstiel skin incision.    PRIMARY SURGEON:  Patricia Hoskins MD    ASSISTANT SURGEON:  Ebonie Chew [facility].  Ms. Chew was asked to assist due to the complicated nature of the procedure and need for an advanced assistant.    ANESTHESIA:  Received epidural.    ESTIMATED BLOOD LOSS:  750 mL    DRAINS:  None.    SPECIMENS:  Umbilical cord blood and tissue.    OPERATIVE FINDINGS:  Female in left occiput transverse vertex presentation, Apgars 8 and 9, weight 8 pounds 3 ounces.  Normal uterus and bilateral fallopian tubes and ovaries.    COMPLICATIONS:  None apparent at time of procedure.      INDICATIONS:  The patient is a 25-year-old  1, now para 1-0-0-1, who was admitted with spontaneous rupture of membranes and eventually augmented with oxytocin by the nurse midwifery service.  She progressed to complete dilation and pushed for 3 hours and 10 minutes with minimal fetal head descent and development of fetal caput.  I was consulted to evaluate to see if she would be a candidate for an operative vaginal delivery or if she needed a primary  section.  Upon my assessment of the patient her bladder had been emptied and upon palpation the fetal head was palpable at the 0 station with caput noted at +1 station.  Upon maximal maternal efforts with contractions, the fetal head itself did not move, but the caput did extend.  Given the high fetal station and the lack of movement after 3 hours of pushing, she was not a candidate for an operative vaginal  delivery and she was consented for a primary  section.    DESCRIPTION OF PROCEDURE:  The patient received Ancef and azithromycin for infection prophylaxis.  She was then dosed to a surgical level with her epidural.  She was placed in dorsal supine position with left lateral tilt.  Fetal heart tones were auscultated.  Matthews catheter was placed to gravity.  She was then prepped and draped in the usual sterile fashion.  After anesthetic was found to be adequate a final timeout was completed.  A Pfannenstiel skin incision was made in the lower abdomen, carried down to the level of the fascia.  The fascia was incised in the midline and extended superiorly and inferiorly.  The rectus muscles were dissected off of the fascia.  The peritoneum was entered into sharply.  The Rocael O retractor was placed for retraction.  The bladder blade was placed against the lower uterine segment.  A low transverse incision was made on the uterus.  The infant was noted to be in cephalic presentation, left occiput transverse.  She was delivered without difficulty.  She was immediately vigorous.  Delayed cord clamping was done and she was handed off to waiting  nurse.  Placenta was delivered via external massage of the uterus.  The uterus was cleaned of all clots and debris and was reapproximated in 2 layers using 0 Vicryl suture.  Next, the bilateral tubes and ovaries were examined and noted to be of normal anatomy.  The Rocael O retractor was removed and the fascia was reapproximated using 0 Vicryl suture.  Subcutaneous tissue was irrigated with saline and the skin was reapproximated using a 3-0 Monocryl in a subcuticular fashion.  At the end of the procedure, the patient was taken to the recovery room and tolerated the procedure well without complication.    Patricia Hoskins MD        D: 2021   T: 2021   MT: VICTOR HUGO    Name:     SCOT NARVAEZ  MRN:      [id number]        Account:        [id number]   :       1995           Procedure Date: 09/07/2021     Document: [id number]

## 2021-09-08 NOTE — PLAN OF CARE
Patient up ad julien and tolerating activity well.  Fundus is firm and midline.  Patient voiding without difficulty.  Taking Tylenol and Ibuprofen and oxycodone for discomfort.  Doing well with breastfeeding and infant cares.

## 2021-09-08 NOTE — PROGRESS NOTES
[facility] Obstetrics Post-Op / Progress Note         Assessment and Plan:    Assessment:   Post-operative day #1  Low transverse primary  section  L&D complications: Arrest of descent      Doing well.  Acute blood loss anemia from surgery Hgb 9.8 Stable.  Clean wound without signs of infection.  Pain well-controlled.      Plan:   Continue post op cares  Anticipate discharge to home on POD#3           Interval History:   Pain is increased today compared to yesterday. Not passing flatus as of yet. Ambulating, voiding.          Significant Problems:    None          Review of Systems:    The Review of Systems is negative other than noted in the HPI          Medications:     Current Facility-Administered Medications   Medication     [START ON 9/10/2021] acetaminophen (TYLENOL) tablet 650 mg     acetaminophen (TYLENOL) tablet 975 mg     [START ON 2021] bisacodyl (DULCOLAX) Suppository 10 mg     carboprost (HEMABATE) injection 250 mcg     dextrose 5% in lactated ringers infusion     diphenhydrAMINE (BENADRYL) capsule 25 mg    Or     diphenhydrAMINE (BENADRYL) injection 12.5 mg     diphenhydrAMINE (BENADRYL) capsule 25 mg    Or     diphenhydrAMINE (BENADRYL) injection 25 mg     enoxaparin ANTICOAGULANT (LOVENOX) injection 40 mg     hydrocortisone 2.5 % cream     HYDROmorphone (PF) (DILAUDID) injection 0.3-0.5 mg     ibuprofen (ADVIL/MOTRIN) tablet 800 mg     IF subcutaneous (SQ) Unfractionated heparin (UFH) ordered for thromboprophylaxis    Or     IF intravenous (IV) Unfractionated heparin (UFH) ordered    Or     IF LOW-dose Low molecular weight heparin (LMWH) thromboprophylaxis ordered    Or     IF HIGHER-dose Low molecular weight heparin (LMWH) thromboprophylaxis ordered     lanolin cream     lidocaine (LMX4) cream     lidocaine 1 % 0.1-1 mL     lidocaine 1 % 0.1-20 mL     magnesium hydroxide (MILK OF MAGNESIA) suspension 30 mL     methylergonovine (METHERGINE) injection 200 mcg      metoclopramide (REGLAN) injection 10 mg    Or     metoclopramide (REGLAN) tablet 10 mg     misoprostol (CYTOTEC) tablet 400 mcg    Or     misoprostol (CYTOTEC) tablet 800 mcg     nalbuphine (NUBAIN) injection 2.5-5 mg     naloxone (NARCAN) injection 0.2 mg     naloxone (NARCAN) injection 0.2 mg     naloxone (NARCAN) injection 0.4 mg     naloxone (NARCAN) injection 0.4 mg     No MMR Needed -  Assessment: Patient does not need MMR vaccine     No Tdap Needed - Assessment: Patient does not need Tdap vaccine     ondansetron (ZOFRAN) injection 4 mg     ondansetron (ZOFRAN-ODT) ODT tab 4 mg    Or     ondansetron (ZOFRAN) injection 4 mg     Opioid plan postpartum - medication instruction     Opioid plan postpartum - medication instruction     oxyCODONE (ROXICODONE) tablet 5 mg     oxytocin (PITOCIN) 30 units in 500 mL 0.9% NaCl infusion     oxytocin (PITOCIN) 30 units in 500 mL 0.9% NaCl infusion     oxytocin (PITOCIN) 30 units in 500 mL 0.9% NaCl infusion     oxytocin (PITOCIN) injection 10 Units     oxytocin (PITOCIN) injection 10 Units     oxytocin (PITOCIN) injection 10 Units     prochlorperazine (COMPAZINE) injection 10 mg    Or     prochlorperazine (COMPAZINE) tablet 10 mg    Or     prochlorperazine (COMPAZINE) suppository 25 mg     scopolamine (TRANSDERM) 72 hr patch 1 patch     senna-docusate (SENOKOT-S/PERICOLACE) 8.6-50 MG per tablet 1 tablet    Or     senna-docusate (SENOKOT-S/PERICOLACE) 8.6-50 MG per tablet 2 tablet     simethicone (MYLICON) chewable tablet 80 mg     sodium chloride (PF) 0.9% PF flush 3 mL     sodium chloride (PF) 0.9% PF flush 3 mL     [START ON 9/9/2021] sodium phosphate (FLEET ENEMA) 1 enema     tranexamic acid 1 g in 100 mL 0.7% NaCl IV bag (premix)             Physical Exam:     Patient Vitals for the past 24 hrs:   BP Temp Temp src Pulse Resp SpO2   09/08/21 0530 128/77 98.3  F (36.8  C) Oral 80 16 100 %   09/08/21 0115 126/80 98  F (36.7  C) Oral 89 16 99 %   09/07/21 2030 112/73 98  F  (36.7  C) Oral 86 18 100 %   09/07/21 1641 126/79 98.6  F (37  C) Oral 89 18 100 %   09/07/21 1546 137/75 98.2  F (36.8  C) Oral 70 18 --   09/07/21 1500 115/72 -- -- 69 16 100 %   09/07/21 1323 119/77 -- -- 92 16 100 %     GEN: NAD  GI: soft, distended and appropriately tender in upper abdomen no rebound or guarding. Incision is clean, dry and intact          Data:     Hemoglobin   Date Value Ref Range Status   09/08/2021 9.8 (L) 11.7 - 15.7 g/dL Final   09/06/2021 12.0 11.7 - 15.7 g/dL Final   05/18/2021 12.0 11.7 - 15.7 g/dL Final   02/15/2021 13.3 11.7 - 15.7 g/dL Final   12/07/2019 13.8 11.7 - 15.7 g/dL Final     -    Patricia Hoskins MD

## 2021-09-08 NOTE — PLAN OF CARE
VSS. Fundus firm and midline. Scant flow. Pain 0/10, pain controled with tylenol and ibuprofen per MAR. Dressing CDI. Breastfeeding. Ambulating free of dizziness or lightheaded. Voiding without difficulty. Encouraged to call with needs, questions, or concerns. Will continue to monitor.

## 2021-09-09 VITALS
BODY MASS INDEX: 42.17 KG/M2 | DIASTOLIC BLOOD PRESSURE: 82 MMHG | WEIGHT: 247 LBS | TEMPERATURE: 97.7 F | HEIGHT: 64 IN | SYSTOLIC BLOOD PRESSURE: 125 MMHG | HEART RATE: 85 BPM | RESPIRATION RATE: 18 BRPM | OXYGEN SATURATION: 100 %

## 2021-09-09 PROCEDURE — 250N000013 HC RX MED GY IP 250 OP 250 PS 637: Performed by: OBSTETRICS & GYNECOLOGY

## 2021-09-09 PROCEDURE — 250N000011 HC RX IP 250 OP 636: Performed by: OBSTETRICS & GYNECOLOGY

## 2021-09-09 RX ORDER — OXYCODONE HYDROCHLORIDE 5 MG/1
5-10 TABLET ORAL
Qty: 30 TABLET | Refills: 0 | Status: SHIPPED | OUTPATIENT
Start: 2021-09-09 | End: 2021-12-09

## 2021-09-09 RX ORDER — IBUPROFEN 800 MG/1
800 TABLET, FILM COATED ORAL EVERY 6 HOURS PRN
Qty: 60 TABLET | Refills: 0 | Status: SHIPPED | OUTPATIENT
Start: 2021-09-09 | End: 2021-12-09

## 2021-09-09 RX ORDER — ACETAMINOPHEN 500 MG
1000 TABLET ORAL EVERY 8 HOURS PRN
Qty: 60 TABLET | Refills: 0 | Status: SHIPPED | OUTPATIENT
Start: 2021-09-09 | End: 2023-01-02

## 2021-09-09 RX ORDER — AMOXICILLIN 250 MG
1 CAPSULE ORAL 2 TIMES DAILY
Qty: 60 TABLET | Refills: 0 | Status: SHIPPED | OUTPATIENT
Start: 2021-09-09 | End: 2021-12-09

## 2021-09-09 RX ADMIN — SENNOSIDES AND DOCUSATE SODIUM 1 TABLET: 8.6; 5 TABLET ORAL at 09:44

## 2021-09-09 RX ADMIN — ACETAMINOPHEN 975 MG: 325 TABLET, FILM COATED ORAL at 12:38

## 2021-09-09 RX ADMIN — OXYCODONE HYDROCHLORIDE 5 MG: 5 TABLET ORAL at 03:03

## 2021-09-09 RX ADMIN — OXYCODONE HYDROCHLORIDE 5 MG: 5 TABLET ORAL at 06:35

## 2021-09-09 RX ADMIN — IBUPROFEN 800 MG: 400 TABLET ORAL at 01:14

## 2021-09-09 RX ADMIN — ACETAMINOPHEN 975 MG: 325 TABLET, FILM COATED ORAL at 01:14

## 2021-09-09 RX ADMIN — ACETAMINOPHEN 975 MG: 325 TABLET, FILM COATED ORAL at 06:35

## 2021-09-09 RX ADMIN — OXYCODONE HYDROCHLORIDE 5 MG: 5 TABLET ORAL at 12:38

## 2021-09-09 RX ADMIN — OXYCODONE HYDROCHLORIDE 5 MG: 5 TABLET ORAL at 09:44

## 2021-09-09 RX ADMIN — ENOXAPARIN SODIUM 40 MG: 40 INJECTION SUBCUTANEOUS at 09:44

## 2021-09-09 RX ADMIN — IBUPROFEN 800 MG: 400 TABLET ORAL at 06:35

## 2021-09-09 RX ADMIN — IBUPROFEN 800 MG: 400 TABLET ORAL at 12:38

## 2021-09-09 NOTE — PROVIDER NOTIFICATION
09/08/21 2013   Provider Notification   Provider Name/Title    Method of Notification Phone   Request Evaluate-Remote   Notification Reason Status Update;Medication Request  (pain)     Dr. Capone returned page. New order to increase oxycodone 5-10mg every 3 hours PRN.

## 2021-09-09 NOTE — PLAN OF CARE
Patient up ad julien and doing well with activity and infant cares.  Fundus is firm and midline and patient voiding without difficulty.  Taking oxycodone and Ibuprofen and Tylenol for discomfort.  Incision intact with steri strips.  Patient would like discharge today.  MD notified and orders entered.  Patient expresses understanding of discharge orders and follow-up appt.

## 2021-09-09 NOTE — DISCHARGE SUMMARY
[facility] Discharge Summary    Kendra Haynes MRN# [id number]   Age: 25 year old YOB: 1995     Date of Admission:  2021  Date of Discharge::  2021  Admitting Physician:  Georgette Rodas CNM  Discharge Physician:  Patricia Hoskins MD     Home clinic: [facility]          Admission Diagnoses:   IUP at 40+0wga  Ruptured Membranes at Term          Discharge Diagnosis:   Postpartum          Procedures:   Procedure(s): Primary low transverse  section       -           Medications Prior to Admission:     Medications Prior to Admission   Medication Sig Dispense Refill Last Dose     Prenatal Vit-Fe Fumarate-FA (PRENATAL VITAMIN PO) Take 1 tablet by mouth   2021 at Unknown time             Discharge Medications:     Current Discharge Medication List      START taking these medications    Details   acetaminophen (TYLENOL) 500 MG tablet Take 2 tablets (1,000 mg) by mouth every 8 hours as needed for mild pain  Qty: 60 tablet, Refills: 0    Associated Diagnoses: S/P  section      ibuprofen (ADVIL/MOTRIN) 800 MG tablet Take 1 tablet (800 mg) by mouth every 6 hours as needed for other (cramping)  Qty: 60 tablet, Refills: 0    Associated Diagnoses: S/P  section      oxyCODONE (ROXICODONE) 5 MG tablet Take 1-2 tablets (5-10 mg) by mouth every 3 hours as needed for moderate to severe pain  Qty: 30 tablet, Refills: 0    Associated Diagnoses: S/P  section      senna-docusate (SENOKOT-S/PERICOLACE) 8.6-50 MG tablet Take 1 tablet by mouth 2 times daily  Qty: 60 tablet, Refills: 0    Associated Diagnoses: S/P  section         CONTINUE these medications which have NOT CHANGED    Details   Prenatal Vit-Fe Fumarate-FA (PRENATAL VITAMIN PO) Take 1 tablet by mouth                   Consultations:   The MD service was consulted due to lack of progression in the second stage of labor.          Brief History of Labor or Admission:    Kendra was admitted under the Midwifery service for spontaneous ruptured membranes. She progressed to complete dilation. She had oxytocin augmentation and pushed for over 3 hours with no fetal head descent below the zero station. I was consulted for assessment and upon evaluating the lack of descent, a primary  section was recommended.            Hospital Course:   The patient's hospital course was unremarkable.  She recovered as anticipated and experienced no post-operative complications.  On discharge, her pain was well controlled. Vaginal bleeding is similar to peak menstrual flow.  Voiding without difficulty.  Ambulating well and tolerating a normal diet.  No fever or significant wound drainage.  Breastfeeding well.  Infant is stable.  No bowel movement yet.  She was discharged on post-partum day #2 per patient request. She experienced anemia due to acute blood loss from surgery but was stable and did not require additional intervention.    Post-partum hemoglobin:   Hemoglobin   Date Value Ref Range Status   2021 9.8 (L) 11.7 - 15.7 g/dL Final   2021 12.0 11.7 - 15.7 g/dL Final             Discharge Instructions and Follow-Up:   Discharge diet: Regular   Discharge activity: Lifting restricted to 15 pounds   Discharge follow-up: Follow up with me in 6 weeks   Wound care: Keep wound clean and dry           Discharge Disposition:   Discharged to home          Patricia Hoskins MD

## 2021-09-09 NOTE — PLAN OF CARE
Vital signs stable. Postpartum assessment WDL. Incision covered with steri strips. Pain controlled with oxycodone, tylenol and ibuprofen. Patient ambulating well. Breastfeeding independently. Patient and infant bonding well. Will continue with current plan of care.

## 2021-09-09 NOTE — PROGRESS NOTES
[facility] Obstetrics Post-Op / Progress Note         Assessment and Plan:    Assessment:   Post-operative day #2  Low transverse primary  section  L&D complications: Arrest of descent      Doing well.  No excessive bleeding  Pain well-controlled.      Plan:   Anticipate discharge tomorrow  Anticipatory guidance given           Interval History:   Oxycodone increased last night. Passing flatus and feels better. Still having incisional pain with ambulation. Desires discharge on POD#3          Significant Problems:    None          Review of Systems:    The Review of Systems is negative other than noted in the HPI          Medications:     Current Facility-Administered Medications   Medication     [START ON 9/10/2021] acetaminophen (TYLENOL) tablet 650 mg     acetaminophen (TYLENOL) tablet 975 mg     bisacodyl (DULCOLAX) Suppository 10 mg     carboprost (HEMABATE) injection 250 mcg     dextrose 5% in lactated ringers infusion     diphenhydrAMINE (BENADRYL) capsule 25 mg    Or     diphenhydrAMINE (BENADRYL) injection 12.5 mg     diphenhydrAMINE (BENADRYL) capsule 25 mg    Or     diphenhydrAMINE (BENADRYL) injection 25 mg     enoxaparin ANTICOAGULANT (LOVENOX) injection 40 mg     hydrocortisone 2.5 % cream     HYDROmorphone (PF) (DILAUDID) injection 0.3-0.5 mg     ibuprofen (ADVIL/MOTRIN) tablet 800 mg     IF subcutaneous (SQ) Unfractionated heparin (UFH) ordered for thromboprophylaxis    Or     IF intravenous (IV) Unfractionated heparin (UFH) ordered    Or     IF LOW-dose Low molecular weight heparin (LMWH) thromboprophylaxis ordered    Or     IF HIGHER-dose Low molecular weight heparin (LMWH) thromboprophylaxis ordered     lanolin cream     lidocaine (LMX4) cream     lidocaine 1 % 0.1-1 mL     lidocaine 1 % 0.1-20 mL     magnesium hydroxide (MILK OF MAGNESIA) suspension 30 mL     methylergonovine (METHERGINE) injection 200 mcg     metoclopramide (REGLAN) injection 10 mg    Or     metoclopramide  (REGLAN) tablet 10 mg     misoprostol (CYTOTEC) tablet 400 mcg    Or     misoprostol (CYTOTEC) tablet 800 mcg     nalbuphine (NUBAIN) injection 2.5-5 mg     naloxone (NARCAN) injection 0.2 mg     naloxone (NARCAN) injection 0.2 mg     naloxone (NARCAN) injection 0.4 mg     naloxone (NARCAN) injection 0.4 mg     No MMR Needed -  Assessment: Patient does not need MMR vaccine     No Tdap Needed - Assessment: Patient does not need Tdap vaccine     ondansetron (ZOFRAN) injection 4 mg     ondansetron (ZOFRAN-ODT) ODT tab 4 mg    Or     ondansetron (ZOFRAN) injection 4 mg     Opioid plan postpartum - medication instruction     Opioid plan postpartum - medication instruction     oxyCODONE (ROXICODONE) tablet 5-10 mg     oxytocin (PITOCIN) 30 units in 500 mL 0.9% NaCl infusion     oxytocin (PITOCIN) 30 units in 500 mL 0.9% NaCl infusion     oxytocin (PITOCIN) 30 units in 500 mL 0.9% NaCl infusion     oxytocin (PITOCIN) injection 10 Units     oxytocin (PITOCIN) injection 10 Units     oxytocin (PITOCIN) injection 10 Units     prochlorperazine (COMPAZINE) injection 10 mg    Or     prochlorperazine (COMPAZINE) tablet 10 mg    Or     prochlorperazine (COMPAZINE) suppository 25 mg     scopolamine (TRANSDERM) 72 hr patch 1 patch     senna-docusate (SENOKOT-S/PERICOLACE) 8.6-50 MG per tablet 1 tablet    Or     senna-docusate (SENOKOT-S/PERICOLACE) 8.6-50 MG per tablet 2 tablet     simethicone (MYLICON) chewable tablet 80 mg     sodium chloride (PF) 0.9% PF flush 3 mL     sodium chloride (PF) 0.9% PF flush 3 mL     sodium phosphate (FLEET ENEMA) 1 enema     tranexamic acid 1 g in 100 mL 0.7% NaCl IV bag (premix)             Physical Exam:     Patient Vitals for the past 24 hrs:   BP Temp Temp src Pulse Resp   09/09/21 0303 -- -- -- -- 16 09/09/21 0019 -- -- -- -- 16 09/08/21 2319 -- -- -- -- 16 09/08/21 2315 121/72 98.6  F (37  C) Oral 77 16 09/08/21 1530 115/75 98.2  F (36.8  C) Oral 81 16 09/08/21 1200 119/80 98.2  F  (36.8  C) Oral 77 16     GEN: NAD  GI: incision is clean,dry and intact with steris. Fundus is below the umbilicus to the left.         Patricia Hoskins MD

## 2021-09-10 ENCOUNTER — PATIENT OUTREACH (OUTPATIENT)
Dept: FAMILY MEDICINE | Facility: CLINIC | Age: 26
End: 2021-09-10

## 2021-09-10 ENCOUNTER — MEDICAL CORRESPONDENCE (OUTPATIENT)
Dept: HEALTH INFORMATION MANAGEMENT | Facility: CLINIC | Age: 26
End: 2021-09-10

## 2021-09-10 DIAGNOSIS — Z53.9 ERRONEOUS ENCOUNTER--DISREGARD: Primary | ICD-10-CM

## 2021-09-10 NOTE — TELEPHONE ENCOUNTER
Encounter created in error, patient is OB/GYN patient    Frannie Vargas RN  [facility]

## 2021-09-13 ENCOUNTER — PATIENT OUTREACH (OUTPATIENT)
Dept: FAMILY MEDICINE | Facility: CLINIC | Age: 26
End: 2021-09-13

## 2021-09-13 DIAGNOSIS — Z53.9 ERRONEOUS ENCOUNTER--DISREGARD: Primary | ICD-10-CM

## 2021-10-10 ENCOUNTER — HEALTH MAINTENANCE LETTER (OUTPATIENT)
Age: 26
End: 2021-10-10

## 2021-10-18 NOTE — PROGRESS NOTES
"Midwife Postpartum 6 Week Visit    Kendra Haynes is a 25 year old here for a postpartum checkup.     Delivery date was 21. She had a  c/s for arrest of descent of a viable girl, named Jennifer, weight 8 pounds 2 oz., with no complications      Since delivery, she has been breast feeding.  She has not had any signs of infection, her lochia stopped after 5 weeks.  She has not had other complications.      She is voiding and having bowel movements without difficulty.       Contraception was discussed and patient desires progesterone only pill.   She  has not had intercourse since delivery.   She complains of No  perineal discomfort.     Patient is \"bored\" at home with , both excited and nervous to return to work in 6 weeks. Planning to continue breastfeeding. She is cutting back her soda intake to help with weight management. Discussed healthy diet and exercise. Pt is looking forward to returning to her exercise routine of weight lifting. Discussed safety of routine, listening to her body for signs to stop or take it easy, and starting low and going slow to gradually work back to pre-pregnancy exercise/weight lifting levels. Pt denies hx of depression/anxiety and reports an even mood. Discussed potential for PPD/PPA with her return to work and other major life events, encouraged her to reach out if she notices mood changes.     Mood is Stable  Patient screened for postpartum depression.   Depression Rating was:   Last PHQ-9 score on record =   PHQ-9 SCORE 10/19/2021   PHQ-9 Total Score MyChart -   PHQ-9 Total Score 0     Last GAD7 score on record =   KRYS-7 SCORE 10/19/2021   Total Score -   Total Score 0     Alcohol Score = 0    ROS:  12 point review of systems negative other than symptoms noted below or in the HPI.       Current Outpatient Medications:      acetaminophen (TYLENOL) 500 MG tablet, Take 2 tablets (1,000 mg) by mouth every 8 hours as needed for mild pain, Disp: 60 tablet, Rfl: 0     " "Drospirenone 4 MG TABS, Take 1 tablet by mouth daily, Disp: 84 tablet, Rfl: 3     Prenatal Vit-Fe Fumarate-FA (PRENATAL VITAMIN PO), Take 1 tablet by mouth, Disp: , Rfl:      ibuprofen (ADVIL/MOTRIN) 800 MG tablet, Take 1 tablet (800 mg) by mouth every 6 hours as needed for other (cramping) (Patient not taking: Reported on 10/19/2021), Disp: 60 tablet, Rfl: 0     oxyCODONE (ROXICODONE) 5 MG tablet, Take 1-2 tablets (5-10 mg) by mouth every 3 hours as needed for moderate to severe pain (Patient not taking: Reported on 10/19/2021), Disp: 30 tablet, Rfl: 0     senna-docusate (SENOKOT-S/PERICOLACE) 8.6-50 MG tablet, Take 1 tablet by mouth 2 times daily (Patient not taking: Reported on 10/19/2021), Disp: 60 tablet, Rfl: 0.   OB History    Para Term  AB Living   1 1 1 0 0 1   SAB TAB Ectopic Multiple Live Births   0 0 0 0 1      # Outcome Date GA Lbr Jakub/2nd Weight Sex Delivery Anes PTL Lv   1 Term 21 40w1d 08:23 / 04:59 3.71 kg (8 lb 2.9 oz) F CS-LTranv EPI N ELVA      Complications: Cephalopelvic Disproportion, Failure to Progress in Second Stage      Name: NARVAEZ,FEMALE-SCOT      Apgar1: 9  Apgar5: 9     Last pap:    Lab Results   Component Value Date    PAP NIL 2018     Hgb in hospital was 9.8g/dL    EXAM:  /62 (BP Location: Right arm, Patient Position: Sitting, Cuff Size: Adult Regular)   Ht 1.626 m (5' 4\")   Wt 100.7 kg (222 lb)   LMP 2020   Breastfeeding Yes   BMI 38.11 kg/m    BMI: Body mass index is 38.11 kg/m .  Constitutional: healthy, alert and no distress   Breast:, deferred, patient lactating.  Abdomen: soft, non-tender, diastasis less than 1 FB's    PELVIC EXAM:  defererd      ASSESSMENT:   Normal postpartum exam after  delivery.      ICD-10-CM    1. Routine postpartum follow-up  Z39.2    2. Encounter for initial prescription of contraceptive pills  Z30.011 Drospirenone 4 MG TABS         PLAN:  No results found for any visits on 10/19/21.    Return as " needed or at time of next expected pap, pelvic, or breast exam.  Teaching: exercise, nutrition, PPD/PPA  Family Planning: progesterone only pill rx'd today. She will call when she is done breastfeeding to switch to MARY. She is understanding she will need a BP check 1-2 after starting estrogen again.   Encourage Kegels and abdominal exercise and slowly getting into normal routine.  Continue a multivitamin/prenatal supplement, especially if breastfeeding.  Pap smear was not obtained today.  Postpartum Hgb was not done today.    GDM:  Fasting and 2hr GCT needed:  No    Return to clinic:  For annual exam in 1 year or sooner if needed.       Rosalie Sheriff, Student Nurse Midwife  [facility]    I was present with the student who participated in the service and in the documentation of the note. I saw and evaluated the patient and agree with the findings and plan of care as documented in the note.    Garrett Loza, KATHY, APRN, CNM    Garrett Loza, KATHY, APRN, CNM

## 2021-10-19 ENCOUNTER — PRENATAL OFFICE VISIT (OUTPATIENT)
Dept: MIDWIFE SERVICES | Facility: CLINIC | Age: 26
End: 2021-10-19
Payer: COMMERCIAL

## 2021-10-19 VITALS
SYSTOLIC BLOOD PRESSURE: 118 MMHG | HEIGHT: 64 IN | WEIGHT: 222 LBS | BODY MASS INDEX: 37.9 KG/M2 | DIASTOLIC BLOOD PRESSURE: 62 MMHG

## 2021-10-19 DIAGNOSIS — Z30.011 ENCOUNTER FOR INITIAL PRESCRIPTION OF CONTRACEPTIVE PILLS: ICD-10-CM

## 2021-10-19 PROCEDURE — 99207 PR POST PARTUM EXAM: CPT | Performed by: ADVANCED PRACTICE MIDWIFE

## 2021-10-19 ASSESSMENT — PATIENT HEALTH QUESTIONNAIRE - PHQ9
5. POOR APPETITE OR OVEREATING: NOT AT ALL
SUM OF ALL RESPONSES TO PHQ QUESTIONS 1-9: 0

## 2021-10-19 ASSESSMENT — ANXIETY QUESTIONNAIRES
3. WORRYING TOO MUCH ABOUT DIFFERENT THINGS: NOT AT ALL
GAD7 TOTAL SCORE: 0
2. NOT BEING ABLE TO STOP OR CONTROL WORRYING: NOT AT ALL
1. FEELING NERVOUS, ANXIOUS, OR ON EDGE: NOT AT ALL
5. BEING SO RESTLESS THAT IT IS HARD TO SIT STILL: NOT AT ALL
IF YOU CHECKED OFF ANY PROBLEMS ON THIS QUESTIONNAIRE, HOW DIFFICULT HAVE THESE PROBLEMS MADE IT FOR YOU TO DO YOUR WORK, TAKE CARE OF THINGS AT HOME, OR GET ALONG WITH OTHER PEOPLE: NOT DIFFICULT AT ALL
6. BECOMING EASILY ANNOYED OR IRRITABLE: NOT AT ALL
7. FEELING AFRAID AS IF SOMETHING AWFUL MIGHT HAPPEN: NOT AT ALL

## 2021-10-19 ASSESSMENT — MIFFLIN-ST. JEOR: SCORE: 1731.99

## 2021-10-19 NOTE — PATIENT INSTRUCTIONS
COMMON BREASTFEEDING PROBLEMS    Women can have several different problems when they breastfeed.  Women often quit breastfeeding when these problems occur, but most problems can be treated and women can continue to breastfeed their babies.    Engorgement:    This is when the breasts are too full of milk.  Breasts can be swollen, hard, warm and painful. This can also make latch more difficult for your baby.  The only proven way to reduce this is to hand express or pump to let some milk out between feedings. Letting out too much milk will exacerbate the problem by producing even more milk.    You can also try:    Cold packs    Taking pain relieving medicine such as acetaminophen (Tylenol) or ibuprofen (Advil, Motrin)    Take a warm shower    Massage your breasts to start milk flow.     Breastfeed your baby on demand, make sure baby is emptying breast completely    Prevent engorgement by limiting pumping if you are breastfeeding on demand    Sore/Painful Nipples:    Some nipple soreness is normal during the first minute of breast feeding. Pain that is lasting longer is not normal after breast feeding is established.  Pain can be caused by nipple cracks and blisters.  This is usually due to an incorrect latch.    The best way to reduce nipple soreness is to make sure your baby is latching correctly on your breast.    You can also try:    Lanolin ointment    APNO (All purpose nipple ointment) which you need a prescription for    Apply a cold or warm cloth to your nipples    Wear breast pads to protect your nipples    Let your nipples air dry after feedings    Cleanse nipple with unscented soap      Blocked Milk Ducts:    A blocked milk duct can cause a red, painful lump in your breast.  It can also cause a white plug at the end of your nipple. If you have a blocked milk duct, breastfeed more often.  Make sure your baby empties your breast after each feeding.  Start your feedings with the breast with the plugged duct and  try various positions to empty the breast as much as you can. Massage your plugged duct in a warm shower to try to unplug it. If these methods do not work you may be at risk for a breast infection.     Breast Infection:    A breast infection is called Mastitis.  In addition to having a hard, red, swollen area of your breast you will most likely will also have fever and chills and not feel well.  Engorgement and incomplete breast emptying can contribute to the problem and make your symptoms worse.    DO NOT STOP breastfeeding when you have mastitis!    Make an appointment with your midwife to be seen as soon as possible.  You will be started immediately on an antibiotic.    You may also:    Use pain medication such as  such as acetaminophen (Tylenol) or ibuprofen (Advil, Motrin)    Massage your breasts during feedings    Use warm compress to encourage milk let down prior to feeding     Use a breast pump to empty your breasts more completely after feedings    Rest for the next day or so and increase your fluids   -Take Chamomile tea, Hops infusion or lemon balm infusion     Watch for increased pain, increasing size of the breast, increasing firmness or a developing mass to suggest an abscess formation.    You should improve quickly on antibiotics.  If you are not improving with in the next 1-2 days or your fever increases > 101 F call the clinic.      For reliable information on breast feeding visit:    "[employer]  Http://[email or web address].org/ ([facility] [place])      If you are struggling with breastfeeding and need extra support you may also consider seeking the advice of a lactation consultation.    Please call with further concerns:    [employer] [facility]  [phone]

## 2021-10-20 PROBLEM — O36.8390 FETAL ARRHYTHMIA AFFECTING PREGNANCY, ANTEPARTUM: Status: RESOLVED | Noted: 2021-04-13 | Resolved: 2021-10-20

## 2021-10-20 PROBLEM — O99.210 OBESITY IN PREGNANCY: Status: RESOLVED | Noted: 2021-02-13 | Resolved: 2021-10-20

## 2021-10-20 PROBLEM — O42.90 PROLONGED RUPTURE OF MEMBRANES: Status: RESOLVED | Noted: 2021-09-07 | Resolved: 2021-10-20

## 2021-10-20 PROBLEM — O26.03 EXCESSIVE WEIGHT GAIN DURING PREGNANCY IN THIRD TRIMESTER: Status: RESOLVED | Noted: 2021-09-01 | Resolved: 2021-10-20

## 2021-10-20 ASSESSMENT — ANXIETY QUESTIONNAIRES: GAD7 TOTAL SCORE: 0

## 2021-12-09 DIAGNOSIS — Z30.011 ENCOUNTER FOR INITIAL PRESCRIPTION OF CONTRACEPTIVE PILLS: ICD-10-CM

## 2021-12-09 RX ORDER — ACETAMINOPHEN AND CODEINE PHOSPHATE 120; 12 MG/5ML; MG/5ML
0.35 SOLUTION ORAL DAILY
Qty: 84 TABLET | Refills: 3 | Status: SHIPPED | OUTPATIENT
Start: 2021-12-09 | End: 2022-12-07

## 2022-01-18 PROCEDURE — U0003 INFECTIOUS AGENT DETECTION BY NUCLEIC ACID (DNA OR RNA); SEVERE ACUTE RESPIRATORY SYNDROME CORONAVIRUS 2 (SARS-COV-2) (CORONAVIRUS DISEASE [COVID-19]), AMPLIFIED PROBE TECHNIQUE, MAKING USE OF HIGH THROUGHPUT TECHNOLOGIES AS DESCRIBED BY CMS-2020-01-R: HCPCS | Performed by: INTERNAL MEDICINE

## 2022-01-19 ENCOUNTER — LAB REQUISITION (OUTPATIENT)
Dept: LAB | Facility: CLINIC | Age: 27
End: 2022-01-19

## 2022-01-20 LAB — SARS-COV-2 RNA RESP QL NAA+PROBE: NEGATIVE

## 2022-01-21 ENCOUNTER — LAB REQUISITION (OUTPATIENT)
Dept: LAB | Facility: CLINIC | Age: 27
End: 2022-01-21

## 2022-01-21 LAB — SARS-COV-2 RNA RESP QL NAA+PROBE: NEGATIVE

## 2022-01-21 PROCEDURE — U0005 INFEC AGEN DETEC AMPLI PROBE: HCPCS | Performed by: INTERNAL MEDICINE

## 2022-02-17 PROBLEM — O09.90 PREGNANCY, SUPERVISION, HIGH-RISK: Status: RESOLVED | Noted: 2021-01-26 | Resolved: 2021-10-19

## 2022-03-29 ENCOUNTER — E-VISIT (OUTPATIENT)
Dept: FAMILY MEDICINE | Facility: CLINIC | Age: 27
End: 2022-03-29
Payer: COMMERCIAL

## 2022-03-29 ENCOUNTER — OFFICE VISIT (OUTPATIENT)
Dept: INTERNAL MEDICINE | Facility: CLINIC | Age: 27
End: 2022-03-29
Payer: COMMERCIAL

## 2022-03-29 VITALS
TEMPERATURE: 99.5 F | DIASTOLIC BLOOD PRESSURE: 86 MMHG | OXYGEN SATURATION: 98 % | WEIGHT: 213 LBS | HEART RATE: 102 BPM | HEIGHT: 64 IN | SYSTOLIC BLOOD PRESSURE: 124 MMHG | BODY MASS INDEX: 36.37 KG/M2

## 2022-03-29 DIAGNOSIS — J02.0 STREPTOCOCCAL SORE THROAT: Primary | ICD-10-CM

## 2022-03-29 DIAGNOSIS — J02.9 ACUTE SORE THROAT: ICD-10-CM

## 2022-03-29 DIAGNOSIS — M54.2 NECK PAIN: Primary | ICD-10-CM

## 2022-03-29 LAB — DEPRECATED S PYO AG THROAT QL EIA: POSITIVE

## 2022-03-29 PROCEDURE — 99207 PR NON-BILLABLE SERV PER CHARTING: CPT | Performed by: NURSE PRACTITIONER

## 2022-03-29 PROCEDURE — 99213 OFFICE O/P EST LOW 20 MIN: CPT | Performed by: INTERNAL MEDICINE

## 2022-03-29 PROCEDURE — 87880 STREP A ASSAY W/OPTIC: CPT | Performed by: INTERNAL MEDICINE

## 2022-03-29 RX ORDER — AMOXICILLIN 875 MG
875 TABLET ORAL 2 TIMES DAILY
Qty: 20 TABLET | Refills: 0 | Status: SHIPPED | OUTPATIENT
Start: 2022-03-29 | End: 2022-04-08

## 2022-03-29 NOTE — PATIENT INSTRUCTIONS
STREP PHARYNGITIS (strep throat):     *  Antibiotics indicated.         --Amoxicillin 875 mg twice per day for 10 days.     *  Main side effects from antibiotics can be rash, diarrhea ( more than three loose stools per day), and/or vaginal yeast infections in women.  Contact us if any possible side effects from antibiotics develop.    *  You should avoid close exposure to others (i.e. No school or work) until you have completed at least one day of antibiotics and are feeling better.     *  Chloraseptic spray for your throat as needed, spray it before you eat or drink.  This is a mild topical numbing agent for your throat.                *  Motrin/Ibuprofen as needed for discomfort from the enlarged tonsils    **If you develop steadily worsening symptoms or become unable to swallow anything without severe pain ( even not being able to swallow your own secretions), then contact me or else go to the Emergency Room as this can indicate a more severe infection.      *  Cough suppressant Delsym, follow directions on bottle    *  Mucinex extended release, one or two tablets twice per day for the next 5-7 days.  This helps loosen the secretions to they can be passed more easily out of the body.     *  Be sure to drink lots of fluids.  If the appetite and intake of food is way down, then at leat try to eat soup.  Dehydration is the thing that causes people to end up in the hospital with viral infections and the flu.     *  For sore throat:  Try lozenges (Cepostat, Cepacol, hard candies, Zinc lozenges, etc)    *  If you have thick secretions:  Mucinex extended release twice per day on a regular basis for the next few days, then twice per day as needed.  OK to take the regular Mucinex, you may just have to take it 2-3 times per day.      *  If you have dry nasal passages or frequent bloody noses during the winter time:  Saline nasal spray as often as needed for dry nose.     *  If you have a lot of congestion and/or runny  noses:  OK to try decongestants as needed (e.g. pseudoephedrine or phenylephrine).  Be sure to take the lowest dose needed.  Take decongestants from only ONE source.  It is possible to inadvertantly take more than the recommended amounts if you take decongestants from multiple products (i.e. Do NOT take Mucinex-D and Claritin-D together)    *  If you have been told to NOT take decongestants or if you cannot tolerate decongestants due to effect on blood pressure, sleep or heart rate:  Try Coricidin HBP or Chlortrimeton (chlorpheniramine).  These can have a similar effect as some decongestants but will not affect your heart rate or blood pressure.      *  If you have SEVERE nasal congestion:  Affrin nasal spray as needed for severe nasal congestion (especially before the airplane trip), but do not use for more than one week.      *  Tylenol as needed for any headaches of low grade temperatures.     *  Ibuprofen/Motrin/Advil or Aleve/Naproxen as needed for body aches, aching muscles/joints, fevers, headaches.  Follow the instructions on the bottle.  Take with food and stop them if you develop an severe stomach pains from these medications.     *  Call the clinic if any changes in the symptoms such as worsening fevers, or the amount and quality of the sputum changes, or if you have any major difficulties breathing, or the symptoms fail to clear for a few weeks.    *  Most upper respiratory infection will clear 1-2 weeks, but it is not uncommon for post viral coughs to last for several weeks, even rarely the entire winter.

## 2022-03-29 NOTE — PROGRESS NOTES
"  Assessment & Plan     (J02.0) Streptococcal sore throat  (primary encounter diagnosis)  Comment: Strep throat.   Antibiotics prescribed as ordered.    Patient to call if any diarrhea develops either while taking this antibiotic or within 6 weeks of this antibiotic.  Use topical Chloraseptic spray as needed for any pain relief.  Recommended symptomatic cares (e.g. Tylenol or Ibuprofen, Chlroaceptic spray, etc.)  Advised to go the the ER immediately if any sudden worsening pain, fevers, difficulty swallowing or problems managing secretions.   Plan: amoxicillin (AMOXIL) 875 MG tablet            (J02.9) Acute sore throat  Comment:   Plan: Streptococcus A Rapid Screen w/Reflex to PCR -         Clinic Collect                        BMI:   Estimated body mass index is 36.56 kg/m  as calculated from the following:    Height as of this encounter: 1.626 m (5' 4\").    Weight as of this encounter: 96.6 kg (213 lb).           Return if symptoms worsen or fail to improve.    Guillaume Vargas MD  [facility]    Kayla Gardner is a 26 year old who presents for the following health issues     Musculoskeletal Problem    History of Present Illness       Reason for visit:  Sore throat neck pain  Symptom onset:  1-3 days ago    She eats 0-1 servings of fruits and vegetables daily.She consumes 1 sweetened beverage(s) daily.She exercises with enough effort to increase her heart rate 30 to 60 minutes per day.  She exercises with enough effort to increase her heart rate 4 days per week.   She is taking medications regularly.       Concern - neck pain  Onset: 2 days  Description: from back of head around to  Front under jaw-  Intensity: Moderate keeping still-severe when trying to move head  Progression of Symptoms:  worsening and constant  Accompanying Signs & Symptoms:reduced  Head ROM, sore throat, swollen lymph nodes  Previous history of similar problem: no  Precipitating factors:        " "Worsened by: head movement  Alleviating factors:        Improved by: none  Therapies tried and outcome: massage, heat Tyl.-all no help      Sore throat x 3 days.   \"feels like I am swallowing glass\"  No fevrs, no chills  No cughing,   No sinus pain, pressure.   esar are full, popping.     Review of Systems   Constitutional, HEENT, cardiovascular, pulmonary, gi and gu systems are negative, except as otherwise noted.      Objective    /86   Pulse 102   Temp 99.5  F (37.5  C) (Tympanic)   Ht 1.626 m (5' 4\")   Wt 96.6 kg (213 lb)   LMP  (LMP Unknown)   SpO2 98%   Breastfeeding Yes   BMI 36.56 kg/m    Body mass index is 36.56 kg/m .  Physical Exam   GENERAL alert and no distress  EYES:  Normal sclera,conjunctiva, EOMI  HENT: Posterior pharynx erythematous, no exudate, facies symmetric  NECK: Neck supple. No LAD, without thyroidmegaly.  RESP: Clear to ausculation bilaterally without wheezes or crackles. Normal BS in all fields.  CV: RRR normal S1S2 without murmurs, rubs or gallops.  LYMPH: no cervical lymph adenopathy appreciated  MS: extremities- no gross deformities of the visible extremities noted,   EXT:  no lower extremity edema  PSYCH: Alert and oriented times 3; speech- coherent  SKIN:  No obvious significant skin lesions on visible portions of face     Results for orders placed or performed in visit on 03/29/22   Streptococcus A Rapid Screen w/Reflex to PCR - Clinic Collect     Status: Abnormal    Specimen: Throat; Swab   Result Value Ref Range    Group A Strep antigen Positive (A) Negative                "

## 2022-03-29 NOTE — PATIENT INSTRUCTIONS
Kendra,       Thank you for choosing us for your care. I think an in-clinic visit would be best next steps based on your symptoms. I have nursing calling you to set up an appointment with me today.       Michaelle Arita, TALON-BC

## 2022-08-17 ENCOUNTER — OFFICE VISIT (OUTPATIENT)
Dept: OBGYN | Facility: CLINIC | Age: 27
End: 2022-08-17
Payer: COMMERCIAL

## 2022-08-17 VITALS
SYSTOLIC BLOOD PRESSURE: 122 MMHG | DIASTOLIC BLOOD PRESSURE: 74 MMHG | WEIGHT: 213.2 LBS | BODY MASS INDEX: 36.4 KG/M2 | HEIGHT: 64 IN

## 2022-08-17 DIAGNOSIS — N64.4 BREAST TENDERNESS IN FEMALE: Primary | ICD-10-CM

## 2022-08-17 PROCEDURE — 99212 OFFICE O/P EST SF 10 MIN: CPT | Performed by: NURSE PRACTITIONER

## 2022-08-17 NOTE — PROGRESS NOTES
SUBJECTIVE:                                                   Kendra Haynes is a 26 year old female who presents to clinic today for the following health issue(s):  Patient presents with:  Breast Problem: Right breast pain (clogged about a week),  back pain, tenderness      HPI:Patient had a baby a year ago, stopped breastfeeding 2 weeks ago.  States still feels pretty full, but has a small bump on right areolar, thinks it is a plugged duct.  Is red and tender, but not redness anywhere else on breast , no fever or chills noted.        Patient's last menstrual period was 07/10/2022 (exact date)..     Patient is sexually active, .  Using none for contraception.    reports that she has never smoked. She has never used smokeless tobacco.    STD testing offered?  Declined    Health maintenance updated:  yes    Today's PHQ-2 Score:   PHQ-2 (  [employer]) 3/29/2022   Q1: Little interest or pleasure in doing things 0   Q2: Feeling down, depressed or hopeless 0   PHQ-2 Score 0   PHQ-2 Total Score (12-17 Years)- Positive if 3 or more points; Administer PHQ-A if positive -   Q1: Little interest or pleasure in doing things Not at all   Q2: Feeling down, depressed or hopeless Not at all   PHQ-2 Score 0     Today's PHQ-9 Score:   PHQ-9 SCORE 10/19/2021   PHQ-9 Total Score MyChart -   PHQ-9 Total Score 0     Today's KRYS-7 Score:   KRYS-7 SCORE 10/19/2021   Total Score -   Total Score 0       Problem list and histories reviewed & adjusted, as indicated.  Additional history: as documented.    Patient Active Problem List   Diagnosis     BMI 35.0-35.9,adult     Past Surgical History:   Procedure Laterality Date      SECTION Bilateral 2021    Procedure:  SECTION;  Surgeon: Patricia Hoskins MD;  Location:  L+D     TONSILLECTOMY & ADENOIDECTOMY Bilateral 1998      Social History     Tobacco Use     Smoking status: Never Smoker     Smokeless tobacco: Never Used   Substance Use Topics      "Alcohol use: Not Currently     Comment: 0-1 drink every 3-4 months      Problem (# of Occurrences) Relation (Name,Age of Onset)    Cancer (1) Paternal Grandmother    Coronary Artery Disease Early Onset (2) Father, Paternal Grandfather    Diabetes (1) Paternal Grandfather    Hypertension (1) Father            Current Outpatient Medications   Medication Sig     Prenatal Vit-Fe Fumarate-FA (PRENATAL VITAMIN PO) Take 1 tablet by mouth     acetaminophen (TYLENOL) 500 MG tablet Take 2 tablets (1,000 mg) by mouth every 8 hours as needed for mild pain     norethindrone (MICRONOR) 0.35 MG tablet Take 1 tablet (0.35 mg) by mouth daily     No current facility-administered medications for this visit.     Allergies   Allergen Reactions     Clindamycin Hives       ROS:  Breast: Lumps and Tenderness  No urinary frequency or dysuria, bladder or kidney problems      OBJECTIVE:     /74   Ht 1.626 m (5' 4\")   Wt 96.7 kg (213 lb 3.2 oz)   LMP 07/10/2022 (Exact Date)   Breastfeeding No   BMI 36.60 kg/m    Body mass index is 36.6 kg/m .    Exam:  Constitutional:  Appearance: Well nourished, well developed alert, in no acute distress  Breasts:  Inspection of Breasts:  Symmetric bilaterally.  No puckering.  No skin changes.  Palpation of Breasts and Axillae:  No masses present on palpation, no breast tenderness Axillary Lymph Nodes:  No lymphadenopathy present  On right breast right at edge of areolar appears to be a infected duct.  Is small red bump that is tender.  There is no other redness noted around area, not warm to touch.    Neurologic:  Mental Status:  Oriented X3.  Normal strength and tone, sensory exam grossly normal, mentation intact and speech normal.    Psychiatric:  Mentation appears normal and affect normal/bright.     In-Clinic Test Results:  No results found for this or any previous visit (from the past 24 hour(s)).    ASSESSMENT/PLAN:                                                        ICD-10-CM    1. " Breast tenderness in female  N64.4        Plugged duct, patient to apply hot packs to area 2-3 times a day.  If symptoms worsen will call consider a prescription for dicloxacillin.  Patient is okay with this plan.  Return prn.    ADALID Wang CNP [facility]

## 2022-09-18 ENCOUNTER — HEALTH MAINTENANCE LETTER (OUTPATIENT)
Age: 27
End: 2022-09-18

## 2022-12-07 ENCOUNTER — PRENATAL OFFICE VISIT (OUTPATIENT)
Dept: NURSING | Facility: CLINIC | Age: 27
End: 2022-12-07
Payer: COMMERCIAL

## 2022-12-07 DIAGNOSIS — Z13.79 GENETIC SCREENING: ICD-10-CM

## 2022-12-07 DIAGNOSIS — O36.80X0 PREGNANCY WITH INCONCLUSIVE FETAL VIABILITY: Primary | ICD-10-CM

## 2022-12-07 DIAGNOSIS — O09.91 SUPERVISION OF HIGH RISK PREGNANCY IN FIRST TRIMESTER: ICD-10-CM

## 2022-12-07 PROCEDURE — 99207 PR NO CHARGE NURSE ONLY: CPT

## 2022-12-07 NOTE — PROGRESS NOTES
SUBJECTIVE:     HPI:    This is a 26 year old female patient,  who presents for her first obstetrical visit.    TAHMINA: 2023, by Last Menstrual Period.  She is 8w0d weeks.  Her cycles are irregular.  Her last menstrual period was normal.   Since her LMP, she has experienced  fatigue and headache).   She denies nausea, emesis, abdominal pain, loss of appetite, vaginal discharge, dysuria, pelvic pain, urinary urgency, lightheadedness, urinary frequency, vaginal bleeding, hemorrhoids and constipation.    Additional History: -LTCS w 1st and wants to discuss a TOLAC  -unvaccinated covid and influenza      Have you travelled during the pregnancy?No  Have your sexual partner(s) travelled during the pregnancy?No    HISTORY:   Planned Pregnancy: Yes  Marital Status:   Occupation: RN in a school  Living in Household: Spouse and Children    Past History:  Her past medical history History reviewed. No pertinent past medical history..      She has a history of  prior  section    Since her last LMP she denies use of alcohol, tobacco and street drugs.    Past medical, surgical, social and family history were reviewed and updated in [facility].      Current Outpatient Medications   Medication     acetaminophen (TYLENOL) 500 MG tablet     Prenatal Vit-Fe Fumarate-FA (PRENATAL VITAMIN PO)     No current facility-administered medications for this visit.       ROS:   12 point review of systems negative other than symptoms noted below or in the HPI.  Constitutional: Fatigue  Neurologic: Headaches    Nurse phone visit completed. Prenatal book and folder (containing standard educational hand-outs and brochures) will be given at next visit to patient. Information in folder reviewed over the phone. Questions answered. Brochure given on optional screening available to assess chromosomal anomalies. Pt desires NIPS. Pt advised to call the clinic if she has any questions or concerns related to her pregnancy. Prenatal labs  future ordered. New prenatal visit scheduled on 1/2/22 with Dr Epstein.  Mely LIMA    Lab Results   Component Value Date    PAP NIL 12/11/2018     PHQ-9 score:    PHQ 12/6/2022   PHQ-9 Total Score 1   Q9: Thoughts of better off dead/self-harm past 2 weeks Not at all       KRYS-7 SCORE 1/25/2021 10/19/2021 12/6/2022   Total Score 4 (minimal anxiety) - 0 (minimal anxiety)   Total Score 4 0 0     Patient supplied answers from flow sheet for:  Prenatal OB Questionnaire.  Past Medical History  Have you ever recieved care for your mental health? : No  Have you ever been in a major accident or suffered serious trauma?: No  Within the last year, has anyone hit, slapped, kicked or otherwise hurt you?: No  In the last year, has anyone forced you to have sex when you didn't want to?: No    Past Medical History 2   Have you ever received a blood transfusion?: No  Would you accept a blood transfusion if was medically recommended?: Yes  Does anyone in your home smoke?: No   Is your blood type Rh negative?: Unknown  Have you ever ?: (!) Yes  Have you been hospitalized for a nonsurgical reason excluding normal delivery?: No  Have you ever had an abnormal pap smear?: (!) Yes    Past Medical History (Continued)  Do you have a history of abnormalities of the uterus?: No  Did your mother take CECI or any other hormones when she was pregnant with you?: Unknown  Do you have any other problems we have not asked about which you feel may be important to this pregnancy?: No

## 2022-12-26 ASSESSMENT — ANXIETY QUESTIONNAIRES
3. WORRYING TOO MUCH ABOUT DIFFERENT THINGS: NOT AT ALL
5. BEING SO RESTLESS THAT IT IS HARD TO SIT STILL: NOT AT ALL
4. TROUBLE RELAXING: NOT AT ALL
GAD7 TOTAL SCORE: 0
GAD7 TOTAL SCORE: 0
IF YOU CHECKED OFF ANY PROBLEMS ON THIS QUESTIONNAIRE, HOW DIFFICULT HAVE THESE PROBLEMS MADE IT FOR YOU TO DO YOUR WORK, TAKE CARE OF THINGS AT HOME, OR GET ALONG WITH OTHER PEOPLE: NOT DIFFICULT AT ALL
GAD7 TOTAL SCORE: 0
6. BECOMING EASILY ANNOYED OR IRRITABLE: NOT AT ALL
7. FEELING AFRAID AS IF SOMETHING AWFUL MIGHT HAPPEN: NOT AT ALL
2. NOT BEING ABLE TO STOP OR CONTROL WORRYING: NOT AT ALL
8. IF YOU CHECKED OFF ANY PROBLEMS, HOW DIFFICULT HAVE THESE MADE IT FOR YOU TO DO YOUR WORK, TAKE CARE OF THINGS AT HOME, OR GET ALONG WITH OTHER PEOPLE?: NOT DIFFICULT AT ALL
7. FEELING AFRAID AS IF SOMETHING AWFUL MIGHT HAPPEN: NOT AT ALL
1. FEELING NERVOUS, ANXIOUS, OR ON EDGE: NOT AT ALL

## 2022-12-26 ASSESSMENT — PATIENT HEALTH QUESTIONNAIRE - PHQ9
SUM OF ALL RESPONSES TO PHQ QUESTIONS 1-9: 1
10. IF YOU CHECKED OFF ANY PROBLEMS, HOW DIFFICULT HAVE THESE PROBLEMS MADE IT FOR YOU TO DO YOUR WORK, TAKE CARE OF THINGS AT HOME, OR GET ALONG WITH OTHER PEOPLE: NOT DIFFICULT AT ALL
SUM OF ALL RESPONSES TO PHQ QUESTIONS 1-9: 1

## 2023-01-01 LAB
ABO/RH(D): NORMAL
ANTIBODY SCREEN: NEGATIVE
SPECIMEN EXPIRATION DATE: NORMAL

## 2023-01-02 ENCOUNTER — ANCILLARY PROCEDURE (OUTPATIENT)
Dept: ULTRASOUND IMAGING | Facility: CLINIC | Age: 28
End: 2023-01-02
Payer: COMMERCIAL

## 2023-01-02 ENCOUNTER — PRENATAL OFFICE VISIT (OUTPATIENT)
Dept: OBGYN | Facility: CLINIC | Age: 28
End: 2023-01-02
Payer: COMMERCIAL

## 2023-01-02 VITALS — WEIGHT: 233 LBS | DIASTOLIC BLOOD PRESSURE: 62 MMHG | SYSTOLIC BLOOD PRESSURE: 102 MMHG | BODY MASS INDEX: 39.99 KG/M2

## 2023-01-02 DIAGNOSIS — O34.219 PREVIOUS CESAREAN DELIVERY, ANTEPARTUM: ICD-10-CM

## 2023-01-02 DIAGNOSIS — O99.211 OBESITY AFFECTING PREGNANCY IN FIRST TRIMESTER: ICD-10-CM

## 2023-01-02 DIAGNOSIS — O09.91 SUPERVISION OF HIGH RISK PREGNANCY IN FIRST TRIMESTER: Primary | ICD-10-CM

## 2023-01-02 DIAGNOSIS — Z13.79 GENETIC SCREENING: ICD-10-CM

## 2023-01-02 DIAGNOSIS — O36.80X0 PREGNANCY WITH INCONCLUSIVE FETAL VIABILITY: ICD-10-CM

## 2023-01-02 DIAGNOSIS — O09.91 SUPERVISION OF HIGH RISK PREGNANCY IN FIRST TRIMESTER: ICD-10-CM

## 2023-01-02 LAB
ALBUMIN UR-MCNC: NEGATIVE MG/DL
APPEARANCE UR: CLEAR
BILIRUB UR QL STRIP: NEGATIVE
COLOR UR AUTO: YELLOW
ERYTHROCYTE [DISTWIDTH] IN BLOOD BY AUTOMATED COUNT: 12 % (ref 10–15)
GLUCOSE UR STRIP-MCNC: NEGATIVE MG/DL
HCT VFR BLD AUTO: 40.9 % (ref 35–47)
HGB BLD-MCNC: 13.7 G/DL (ref 11.7–15.7)
HGB UR QL STRIP: NEGATIVE
KETONES UR STRIP-MCNC: NEGATIVE MG/DL
LEUKOCYTE ESTERASE UR QL STRIP: NEGATIVE
MCH RBC QN AUTO: 29.5 PG (ref 26.5–33)
MCHC RBC AUTO-ENTMCNC: 33.5 G/DL (ref 31.5–36.5)
MCV RBC AUTO: 88 FL (ref 78–100)
NITRATE UR QL: NEGATIVE
PH UR STRIP: 6 [PH] (ref 5–7)
PLATELET # BLD AUTO: 288 10E3/UL (ref 150–450)
RBC # BLD AUTO: 4.64 10E6/UL (ref 3.8–5.2)
SP GR UR STRIP: <=1.005 (ref 1–1.03)
UROBILINOGEN UR STRIP-ACNC: 0.2 E.U./DL
WBC # BLD AUTO: 9.3 10E3/UL (ref 4–11)

## 2023-01-02 PROCEDURE — 86900 BLOOD TYPING SEROLOGIC ABO: CPT | Performed by: STUDENT IN AN ORGANIZED HEALTH CARE EDUCATION/TRAINING PROGRAM

## 2023-01-02 PROCEDURE — 99207 PR FIRST OB VISIT: CPT | Performed by: STUDENT IN AN ORGANIZED HEALTH CARE EDUCATION/TRAINING PROGRAM

## 2023-01-02 PROCEDURE — 86803 HEPATITIS C AB TEST: CPT | Performed by: STUDENT IN AN ORGANIZED HEALTH CARE EDUCATION/TRAINING PROGRAM

## 2023-01-02 PROCEDURE — 85027 COMPLETE CBC AUTOMATED: CPT | Performed by: STUDENT IN AN ORGANIZED HEALTH CARE EDUCATION/TRAINING PROGRAM

## 2023-01-02 PROCEDURE — G0145 SCR C/V CYTO,THINLAYER,RESCR: HCPCS | Performed by: STUDENT IN AN ORGANIZED HEALTH CARE EDUCATION/TRAINING PROGRAM

## 2023-01-02 PROCEDURE — 81003 URINALYSIS AUTO W/O SCOPE: CPT | Performed by: STUDENT IN AN ORGANIZED HEALTH CARE EDUCATION/TRAINING PROGRAM

## 2023-01-02 PROCEDURE — 76817 TRANSVAGINAL US OBSTETRIC: CPT | Performed by: STUDENT IN AN ORGANIZED HEALTH CARE EDUCATION/TRAINING PROGRAM

## 2023-01-02 PROCEDURE — 86901 BLOOD TYPING SEROLOGIC RH(D): CPT | Performed by: STUDENT IN AN ORGANIZED HEALTH CARE EDUCATION/TRAINING PROGRAM

## 2023-01-02 PROCEDURE — 36415 COLL VENOUS BLD VENIPUNCTURE: CPT | Performed by: STUDENT IN AN ORGANIZED HEALTH CARE EDUCATION/TRAINING PROGRAM

## 2023-01-02 PROCEDURE — 87389 HIV-1 AG W/HIV-1&-2 AB AG IA: CPT | Performed by: STUDENT IN AN ORGANIZED HEALTH CARE EDUCATION/TRAINING PROGRAM

## 2023-01-02 PROCEDURE — 87340 HEPATITIS B SURFACE AG IA: CPT | Performed by: STUDENT IN AN ORGANIZED HEALTH CARE EDUCATION/TRAINING PROGRAM

## 2023-01-02 PROCEDURE — 86780 TREPONEMA PALLIDUM: CPT | Performed by: STUDENT IN AN ORGANIZED HEALTH CARE EDUCATION/TRAINING PROGRAM

## 2023-01-02 PROCEDURE — 86850 RBC ANTIBODY SCREEN: CPT | Performed by: STUDENT IN AN ORGANIZED HEALTH CARE EDUCATION/TRAINING PROGRAM

## 2023-01-02 PROCEDURE — 87491 CHLMYD TRACH DNA AMP PROBE: CPT | Performed by: STUDENT IN AN ORGANIZED HEALTH CARE EDUCATION/TRAINING PROGRAM

## 2023-01-02 PROCEDURE — 87591 N.GONORRHOEAE DNA AMP PROB: CPT | Performed by: STUDENT IN AN ORGANIZED HEALTH CARE EDUCATION/TRAINING PROGRAM

## 2023-01-02 PROCEDURE — 86762 RUBELLA ANTIBODY: CPT | Performed by: STUDENT IN AN ORGANIZED HEALTH CARE EDUCATION/TRAINING PROGRAM

## 2023-01-02 PROCEDURE — 87086 URINE CULTURE/COLONY COUNT: CPT | Performed by: STUDENT IN AN ORGANIZED HEALTH CARE EDUCATION/TRAINING PROGRAM

## 2023-01-02 NOTE — PROGRESS NOTES
[facility]  OB/GYN Clinic Note     HPI:     This is a 26 year old female patient,  who presents for her first obstetrical visit. TAHMINA: 2023, by Last Menstrual Period.  She is 8w0d weeks at nurse visit 22.  Her cycles are irregular.  Her last menstrual period was normal.   Since her LMP, she has experienced  fatigue and headache). She denies nausea, emesis, abdominal pain, loss of appetite, vaginal discharge, dysuria, pelvic pain, urinary urgency, lightheadedness, urinary frequency, vaginal bleeding, hemorrhoids and constipation.     Additional History:  -LTCS w 1st and wants to discuss a TOLAC- arrest of descent. Would like to try for vaginal delivery.   -unvaccinated covid and influenza    Have you travelled during the pregnancy?No  Have your sexual partner(s) travelled during the pregnancy?No     HISTORY:   Planned Pregnancy: Yes  Marital Status:   Occupation: RN in a school  Living in Household: Spouse and Children     Past History:  Her past medical history   Past Medical History  History reviewed. No pertinent past medical history.     She has a history of  prior  section  Since her last LMP she denies use of alcohol, tobacco and street drugs.  Past medical, surgical, social and family history were reviewed and updated in [facility].      Current Outpatient Medications  Medication    acetaminophen (TYLENOL) 500 MG tablet    Prenatal Vit-Fe Fumarate-FA (PRENATAL VITAMIN PO)     No current facility-administered medications for this visit.     ROS:   12 point review of systems negative other than symptoms noted below or in the HPI.  Constitutional: Fatigue  Neurologic: Headaches     Nurse phone visit completed. Prenatal book and folder (containing standard educational hand-outs and brochures) will be given at next visit to patient. Information in folder reviewed over the phone. Questions answered. Brochure given on optional screening available to assess chromosomal  anomalies. Pt desires NIPS. Pt advised to call the clinic if she has any questions or concerns related to her pregnancy. Prenatal labs future ordered. New prenatal visit scheduled on 1/2/22 with Dr Skylar Boone RN     PAP NIL 12/11/2018     PHQ-9 SCORE 12/26/2022   PHQ-9 Total Score MyChart 1 (Minimal depression)   PHQ-9 Total Score 1   KRYS-7 SCORE 12/26/2022   Total Score 0 (minimal anxiety)   Total Score 0     Patient supplied answers from flow sheet for:  Prenatal OB Questionnaire.  Past Medical History  Have you ever recieved care for your mental health? : No  Have you ever been in a major accident or suffered serious trauma?: No  Within the last year, has anyone hit, slapped, kicked or otherwise hurt you?: No  In the last year, has anyone forced you to have sex when you didn't want to?: No     Past Medical History 2   Have you ever received a blood transfusion?: No  Would you accept a blood transfusion if was medically recommended?: Yes  Does anyone in your home smoke?: No   Is your blood type Rh negative?: Unknown  Have you ever ?: (!) Yes  Have you been hospitalized for a nonsurgical reason excluding normal delivery?: No  Have you ever had an abnormal pap smear?: (!) Yes     Past Medical History (Continued)  Do you have a history of abnormalities of the uterus?: No  Did your mother take CECI or any other hormones when she was pregnant with you?: Unknown  Do you have any other problems we have not asked about which you feel may be important to this pregnancy?: No       OBJECTIVE:     EXAM:  /62   Wt 105.7 kg (233 lb)   LMP 10/12/2022   BMI 39.99 kg/m   Body mass index is 39.99 kg/m .    GENERAL: healthy, alert and no distress  NECK: no adenopathy, no asymmetry, masses, or scars and thyroid normal to palpation  RESP: lungs clear to auscultation - no rales, rhonchi or wheezes  CV: regular rate and rhythm, normal S1 S2, no S3 or S4, no murmur, click or rub, no peripheral edema and peripheral  pulses strong  ABDOMEN: soft, nontender, no hepatosplenomegaly, no masses and bowel sounds normal   (female): normal female external genitalia, normal urethral meatus, vaginal mucosa pink, moist, well rugated, and normal cervix/adnexa/uterus without masses or discharge  NEURO: Normal strength and tone, mentation intact and speech normal  PSYCH: mentation appears normal, affect normal/bright    ASSESSMENT/PLAN:   27 year old  at 11w5d by LMP c/w 11w4d US here today for initial prenatal visit. Reports doing well overall.       ICD-10-CM    1. Supervision of high risk pregnancy in first trimester  O09.91 Urine Culture Aerobic Bacterial     *UA reflex to Microscopic     Pap imaged thin layer screen reflex to HPV if ASCUS - recommend age 25 - 29     NEISSERIA GONORRHOEA PCR     CHLAMYDIA TRACHOMATIS PCR      2. Obesity affecting pregnancy in first trimester  O99.211       3. Previous  delivery, antepartum  O34.219           1. Normal first ob visit.          Labs: GC/Chlam, UA/UCx, CBC, Type and Screen, RPR, Hep B, Hep C, Rubella, HIV, Pap.         Prenatal testing: reviewed options for genetic screening, patient desires NIPT, does NOT want to know fetal sex .        Vaccines: covid- declines. Influenza needs to get.          Education: discussed return precautions, including cramping, vaginal bleeding.  Discussed schedule of prenatal visits through 28w, delivery at [facility] and on-call system for deliveries.          2.  Weight gain        Prepregnancy BMI: Body mass index is 39.99 kg/m .        Expected weight gain:    - Recommended weight gain for pregnancy:    BMI   < 18.5   28-40 lbs   18.5 - 24.9 25-35   25 - 29.9 15-25   > 30  11-20  Discussed ASA at 12w, will .        3. Hx of CS x 1 for arrest of descent. TOLAC calculator 30-40% success rate. Strongly desires TOLAC. Reviewed risks of failed TOLAC. Continue to address as pregnancy progresses .    RTC in 4-6weeks    Sharri MARINA  MD Tete, S  01/02/23

## 2023-01-03 LAB
C TRACH DNA SPEC QL NAA+PROBE: NEGATIVE
HBV SURFACE AG SERPL QL IA: NONREACTIVE
HCV AB SERPL QL IA: NONREACTIVE
HIV 1+2 AB+HIV1 P24 AG SERPL QL IA: NONREACTIVE
N GONORRHOEA DNA SPEC QL NAA+PROBE: NEGATIVE
RUBV IGG SERPL QL IA: 1.57 INDEX
RUBV IGG SERPL QL IA: POSITIVE
T PALLIDUM AB SER QL: NONREACTIVE

## 2023-01-04 LAB
BACTERIA UR CULT: NO GROWTH
BKR LAB AP GYN ADEQUACY: NORMAL
BKR LAB AP GYN INTERPRETATION: NORMAL
BKR LAB AP HPV REFLEX: NORMAL
BKR LAB AP PREVIOUS ABNORMAL: NORMAL
PATH REPORT.COMMENTS IMP SPEC: NORMAL
PATH REPORT.COMMENTS IMP SPEC: NORMAL
PATH REPORT.RELEVANT HX SPEC: NORMAL

## 2023-01-11 LAB — SCANNED LAB RESULT: NORMAL

## 2023-01-17 ENCOUNTER — MYC MEDICAL ADVICE (OUTPATIENT)
Dept: OBGYN | Facility: CLINIC | Age: 28
End: 2023-01-17
Payer: COMMERCIAL

## 2023-01-24 NOTE — TELEPHONE ENCOUNTER
Type of Paperwork received:  fmla     Date Rcvd:  1/17/2023    Rcvd From (Company name): [employer]    Provider:  Tete Alanis on Provider Cart Date:  1/24/2023

## 2023-01-28 ENCOUNTER — HEALTH MAINTENANCE LETTER (OUTPATIENT)
Age: 28
End: 2023-01-28

## 2023-01-31 ENCOUNTER — TRANSCRIBE ORDERS (OUTPATIENT)
Dept: MATERNAL FETAL MEDICINE | Facility: CLINIC | Age: 28
End: 2023-01-31

## 2023-01-31 ENCOUNTER — PRENATAL OFFICE VISIT (OUTPATIENT)
Dept: OBGYN | Facility: CLINIC | Age: 28
End: 2023-01-31
Payer: COMMERCIAL

## 2023-01-31 VITALS — BODY MASS INDEX: 40.27 KG/M2 | DIASTOLIC BLOOD PRESSURE: 62 MMHG | WEIGHT: 234.6 LBS | SYSTOLIC BLOOD PRESSURE: 108 MMHG

## 2023-01-31 DIAGNOSIS — Z36.1 ANTENATAL SCREENING FOR RAISED ALPHAFETOPROTEIN LEVEL: ICD-10-CM

## 2023-01-31 DIAGNOSIS — O09.92 SUPERVISION OF HIGH RISK PREGNANCY IN SECOND TRIMESTER: Primary | ICD-10-CM

## 2023-01-31 DIAGNOSIS — O99.211 OBESITY AFFECTING PREGNANCY IN FIRST TRIMESTER: ICD-10-CM

## 2023-01-31 DIAGNOSIS — O26.90 PREGNANCY RELATED CONDITION, ANTEPARTUM: Primary | ICD-10-CM

## 2023-01-31 PROCEDURE — 99207 PR PRENATAL VISIT: CPT | Performed by: STUDENT IN AN ORGANIZED HEALTH CARE EDUCATION/TRAINING PROGRAM

## 2023-01-31 RX ORDER — ASPIRIN 81 MG/1
81 TABLET, CHEWABLE ORAL DAILY
Status: ON HOLD | COMMUNITY
End: 2023-07-15

## 2023-01-31 NOTE — PROGRESS NOTES
Prenatal Care (15w6d)    Kendra Haynes is a 27 year old  at 15w6d by LMP c/w 11w4d US presenting for routine prenatal care. She is doing well. Some constipation.  Denies LOF, VB, ctx. +FM.    Conditions affecting pregnancy:  - BMI 36  - Hx of CS x 1      Objective- see flow sheet    Kendra Haynes is a 27 year old  at 15w6d presenting for routine ob visit.       ICD-10-CM    1. Supervision of high risk pregnancy in second trimester  O09.92 US OB > 14 Weeks      2.  screening for raised alphafetoprotein level  Z36.1 Alpha fetoprotein maternal screen        - First tri labs wnl. Delcines AFP. Level II US ordered.  - Discussed routine precautions  - Hx of CS x 1- would like to TOLAC.   - TWG: 3lbs. Pregravid BMI 36. Expect 11-20lbs. On ASA.    Follow-up in 4 weeks for routine care.     Sharri Epstein MD, MHS  23

## 2023-02-13 ENCOUNTER — PRE VISIT (OUTPATIENT)
Dept: MATERNAL FETAL MEDICINE | Facility: CLINIC | Age: 28
End: 2023-02-13
Payer: COMMERCIAL

## 2023-02-20 ENCOUNTER — OFFICE VISIT (OUTPATIENT)
Dept: MATERNAL FETAL MEDICINE | Facility: CLINIC | Age: 28
End: 2023-02-20
Attending: STUDENT IN AN ORGANIZED HEALTH CARE EDUCATION/TRAINING PROGRAM
Payer: COMMERCIAL

## 2023-02-20 ENCOUNTER — HOSPITAL ENCOUNTER (OUTPATIENT)
Dept: ULTRASOUND IMAGING | Facility: CLINIC | Age: 28
Discharge: HOME | End: 2023-02-20
Attending: STUDENT IN AN ORGANIZED HEALTH CARE EDUCATION/TRAINING PROGRAM
Payer: COMMERCIAL

## 2023-02-20 DIAGNOSIS — O26.90 PREGNANCY RELATED CONDITION, ANTEPARTUM: ICD-10-CM

## 2023-02-20 DIAGNOSIS — O99.210 OBESITY IN PREGNANCY: Primary | ICD-10-CM

## 2023-02-20 PROCEDURE — 76811 OB US DETAILED SNGL FETUS: CPT

## 2023-02-20 PROCEDURE — 76811 OB US DETAILED SNGL FETUS: CPT | Mod: 26 | Performed by: OBSTETRICS & GYNECOLOGY

## 2023-02-20 NOTE — NURSING NOTE
Patient reports  no pain, no contractions, leaking of fluid, or bleeding.   SBAR given to MYA JOHNSON, see their note in Epic.

## 2023-02-28 ENCOUNTER — PRENATAL OFFICE VISIT (OUTPATIENT)
Dept: OBGYN | Facility: CLINIC | Age: 28
End: 2023-02-28
Payer: COMMERCIAL

## 2023-02-28 VITALS — SYSTOLIC BLOOD PRESSURE: 118 MMHG | BODY MASS INDEX: 41.71 KG/M2 | WEIGHT: 243 LBS | DIASTOLIC BLOOD PRESSURE: 64 MMHG

## 2023-02-28 DIAGNOSIS — O09.92 SUPERVISION OF HIGH RISK PREGNANCY IN SECOND TRIMESTER: Primary | ICD-10-CM

## 2023-02-28 PROCEDURE — 99207 PR PRENATAL VISIT: CPT | Performed by: STUDENT IN AN ORGANIZED HEALTH CARE EDUCATION/TRAINING PROGRAM

## 2023-02-28 NOTE — PROGRESS NOTES
Prenatal Care (19w6d)    Kendra Haynes is a 27 year old  at 19w6d by LMP c/w 11w4d US presenting for routine prenatal care. She is doing well. Feeling tired. Denies LOF, VB, ctx. +FM.    Conditions affecting pregnancy:  - BMI 36  - Hx of CS x 1      Objective- see flow sheet    Kendra Haynes is a 27 year old  at 19w6d presenting for routine ob visit.       ICD-10-CM    1. Supervision of high risk pregnancy in second trimester  O09.92         - First tri labs wnl. Delcines AFP. Level II US- suboptimal views. Follow-up scheduled.   - Discussed routine precautions. Discussed fatigue mitigation strategies.   - Hx of CS x 1- would like to TOLAC.   - TWlbs. Pregravid BMI 36. Expect 11-20lbs. On ASA. Weekly BPP at 37w. Reviewed healthy weight gain/exercise.      Follow-up in 4 weeks for routine care.     Sharri Epstein MD, MHS  23

## 2023-03-14 ENCOUNTER — E-VISIT (OUTPATIENT)
Dept: OBGYN | Facility: CLINIC | Age: 28
End: 2023-03-14
Payer: COMMERCIAL

## 2023-03-14 ENCOUNTER — OFFICE VISIT (OUTPATIENT)
Dept: MATERNAL FETAL MEDICINE | Facility: CLINIC | Age: 28
End: 2023-03-14
Attending: OBSTETRICS & GYNECOLOGY
Payer: COMMERCIAL

## 2023-03-14 ENCOUNTER — HOSPITAL ENCOUNTER (OUTPATIENT)
Dept: ULTRASOUND IMAGING | Facility: CLINIC | Age: 28
Discharge: HOME | End: 2023-03-14
Attending: OBSTETRICS & GYNECOLOGY
Payer: COMMERCIAL

## 2023-03-14 DIAGNOSIS — O99.210 OBESITY IN PREGNANCY: ICD-10-CM

## 2023-03-14 DIAGNOSIS — S39.012A STRAIN OF LUMBAR REGION, INITIAL ENCOUNTER: Primary | ICD-10-CM

## 2023-03-14 DIAGNOSIS — O99.210 OBESITY DURING PREGNANCY: Primary | ICD-10-CM

## 2023-03-14 PROCEDURE — 76816 OB US FOLLOW-UP PER FETUS: CPT | Mod: 26 | Performed by: OBSTETRICS & GYNECOLOGY

## 2023-03-14 PROCEDURE — 76816 OB US FOLLOW-UP PER FETUS: CPT

## 2023-03-14 PROCEDURE — 99207 PR NO CHARGE LOS: CPT | Performed by: STUDENT IN AN ORGANIZED HEALTH CARE EDUCATION/TRAINING PROGRAM

## 2023-03-14 NOTE — PROGRESS NOTES
"Please see \"Imaging\" tab under \"Chart Review\" for details of today's visit.    Victor Manuel Machado    "

## 2023-03-14 NOTE — PATIENT INSTRUCTIONS
Thank you for choosing us for your care. Based on your symptoms and length of illness, I do not think that you need a prescription at this time.  Please follow the care advise I've provided and use the over the counter medications to help relieve your symptoms.  View your full visit summary for details by clicking on the link below.     If you're not feeling better within 2-3 weeks, please respond to this message and we can consider if a prescription is needed.  You can schedule an appointment right here in [facility], or call [phone]  If the visit is for the same symptoms as your eVisit, we'll refund the cost of your eVisit if seen within seven days.      Caring for Your Back    You are not alone.    Low back pain is very common. Nearly half of all adults will have low back pain in any given year. The good news is that back pain is rarely a danger to your health. Most people can manage their back pain on their own. About half of people start feeling better within two weeks. In 9 out of 10 cases, low back pain goes away or no longer limits daily activity within 6 weeks.     Your outlook is good!     Your symptoms tell us that your low back pain is most likely not a danger to you. Most of the time we will not know the exact cause of low back pain, even if you see a doctor or have an MRI. However, treatment can still work without knowing the cause of the pain. Less than 1 in 100 people need surgery for their back pain.     What can I do about my low back pain?     There are three basic things you can do to ease low back pain and help it go away.     Use heat or cold packs.    Take medicine as directed.    Use positions, movements and exercises.    Using heat or cold packs:    Try cold packs or gentle heat to ease your pain.  Use whichever gives you the most relief. Apply the cold pack or heat for 15 minutes at a time, as often as needed.    Taking medicine:    If taking over-the-counter medicine:    Take  ibuprofen (Advil, Motrin) 600 mg three times a day as needed for pain.  OR    Take Aleve (naproxen) 220 to 440 mg two times a day as needed for pain. If your doctor prescribed a muscle relaxant (cyclobenzaprine 10 mg.):    Take   to 1 tablet at bedtime.    Do not drive when taking this medicine. This drug may make you sleepy.     Using positions, movements and exercises:    Research tells us that moving your joints and muscles can help you recover from back pain. Such activity should be simple and gentle. Use the positions below as well as walking to help relieve your pain. Try taking a short walk every 3 to 4 hours during the day. Walk for a few minutes inside your home or take longer walks outside, on a treadmill or at a mall. Slowly increase the amount of time you walk. Expect discomfort when you begin, but it should lessen as your back starts to heal. When your back feels better, walk daily to keep your back and body healthy.    Finding a position that is comfortable:    When your back pain is new, certain positions will ease your pain. Gently try each of the positions below until you find one that is helpful. Once you find a position of comfort, use it as often as you like when you are resting. You will recover faster if you combine rest with activity.    * Lie on your back with your legs bent. You can do this by placing a pillow under your knees or lie on the floor and rest your lower legs on the seat of a chair.  * Lie on your side with your knees bent and place a pillow between your knees.    Lie on your stomach over pillows.       When should I call my doctor?    Your back pain should improve over the first couple of weeks. As it improves, you should be able to return to your normal activities.  But call your doctor if:      You have a sudden change in your ability to control your bladder or bowels.    You begin to feel tingling in your groin or legs.    The pain spreads down your leg and into your foot.     Your toes, feet or leg muscles begin to feel weak.    You feel generally unwell or sick.    Your pain gets worse.    If you are deaf or hard of hearing, please let us know. We provide many free services including sign language interpreters, oral interpreters, TTYs, telephone amplifiers, note takers and written materials.    For informational purposes only. Not to replace the advice of your health care provider. Copyright   2013 [facility]. All rights reserved. [employer] [id number] - 04/13.

## 2023-03-14 NOTE — NURSING NOTE
Patient reports positive fetal movement, no pain, no contractions, leaking of fluid, or bleeding.  SBAR given to MYA JOHNSON, see their note in Epic.

## 2023-03-31 ENCOUNTER — PRENATAL OFFICE VISIT (OUTPATIENT)
Dept: OBGYN | Facility: CLINIC | Age: 28
End: 2023-03-31
Payer: COMMERCIAL

## 2023-03-31 VITALS — DIASTOLIC BLOOD PRESSURE: 64 MMHG | WEIGHT: 248.2 LBS | SYSTOLIC BLOOD PRESSURE: 120 MMHG | BODY MASS INDEX: 42.6 KG/M2

## 2023-03-31 DIAGNOSIS — O99.211 OBESITY AFFECTING PREGNANCY IN FIRST TRIMESTER: ICD-10-CM

## 2023-03-31 DIAGNOSIS — O09.92 SUPERVISION OF HIGH RISK PREGNANCY IN SECOND TRIMESTER: Primary | ICD-10-CM

## 2023-03-31 DIAGNOSIS — O34.219 PREVIOUS CESAREAN DELIVERY, ANTEPARTUM: ICD-10-CM

## 2023-03-31 PROCEDURE — 99207 PR PRENATAL VISIT: CPT | Performed by: STUDENT IN AN ORGANIZED HEALTH CARE EDUCATION/TRAINING PROGRAM

## 2023-03-31 NOTE — PROGRESS NOTES
Prenatal Care (24w2d)    Kendra Haynes is a 27 year old  at 24w2d by LMP c/w 11w4d US presenting for routine prenatal care. She is doing well. Back pain recently, but is better now. Traveling to [place] for spring break. Moving to new home in three weeks. Denies LOF, VB, ctx. +FM.    Conditions affecting pregnancy:  - BMI 36  - Hx of CS x 1    Objective- see flow sheet    Kendra Haynes is a 27 year old  at 24w2d presenting for routine ob visit.       ICD-10-CM    1. Supervision of high risk pregnancy in second trimester  O09.92 US OB >14 Weeks Follow Up      2. Obesity affecting pregnancy in first trimester  O99.211 US OB >14 Weeks Follow Up      3. Previous  delivery, antepartum  O34.219         - First tri labs wnl. Delcines AFP. Level II US- suboptimal views. Follow-up wnl. Third tri labs next visit, TDAP.   - Discussed routine precautions.  - Hx of CS x 1- would like to TOLAC.   - TWlbs. Pregravid BMI 36. Expect 11-20lbs. On ASA. Growth at 28w and 34w- ordered, needs to be scheduled. Weekly BPP at 37w. Reviewed healthy weight gain/exercise.      Follow-up in 4 weeks for routine care.     Sharri Epstein MD, S  23

## 2023-04-17 DIAGNOSIS — Z23 NEED FOR TDAP VACCINATION: ICD-10-CM

## 2023-04-17 DIAGNOSIS — Z36.9 ENCOUNTER FOR ANTENATAL SCREENING OF MOTHER: Primary | ICD-10-CM

## 2023-04-25 ENCOUNTER — PRENATAL OFFICE VISIT (OUTPATIENT)
Dept: OBGYN | Facility: CLINIC | Age: 28
End: 2023-04-25
Payer: COMMERCIAL

## 2023-04-25 ENCOUNTER — ANCILLARY PROCEDURE (OUTPATIENT)
Dept: ULTRASOUND IMAGING | Facility: CLINIC | Age: 28
End: 2023-04-25
Attending: STUDENT IN AN ORGANIZED HEALTH CARE EDUCATION/TRAINING PROGRAM
Payer: COMMERCIAL

## 2023-04-25 ENCOUNTER — LAB (OUTPATIENT)
Dept: LAB | Facility: CLINIC | Age: 28
End: 2023-04-25
Payer: COMMERCIAL

## 2023-04-25 VITALS — SYSTOLIC BLOOD PRESSURE: 118 MMHG | BODY MASS INDEX: 43.7 KG/M2 | WEIGHT: 254.6 LBS | DIASTOLIC BLOOD PRESSURE: 68 MMHG

## 2023-04-25 DIAGNOSIS — O34.219 PREVIOUS CESAREAN DELIVERY, ANTEPARTUM: ICD-10-CM

## 2023-04-25 DIAGNOSIS — Z23 NEED FOR TDAP VACCINATION: ICD-10-CM

## 2023-04-25 DIAGNOSIS — O09.92 SUPERVISION OF HIGH RISK PREGNANCY IN SECOND TRIMESTER: ICD-10-CM

## 2023-04-25 DIAGNOSIS — O99.211 OBESITY AFFECTING PREGNANCY IN FIRST TRIMESTER: ICD-10-CM

## 2023-04-25 DIAGNOSIS — O09.93 SUPERVISION OF HIGH RISK PREGNANCY IN THIRD TRIMESTER: Primary | ICD-10-CM

## 2023-04-25 DIAGNOSIS — Z36.9 ENCOUNTER FOR ANTENATAL SCREENING OF MOTHER: ICD-10-CM

## 2023-04-25 PROBLEM — O09.90 SUPERVISION OF HIGH-RISK PREGNANCY: Status: ACTIVE | Noted: 2022-12-07

## 2023-04-25 LAB
ERYTHROCYTE [DISTWIDTH] IN BLOOD BY AUTOMATED COUNT: 11.9 % (ref 10–15)
GLUCOSE 1H P 50 G GLC PO SERPL-MCNC: 92 MG/DL (ref 70–129)
HCT VFR BLD AUTO: 36.3 % (ref 35–47)
HGB BLD-MCNC: 11.9 G/DL (ref 11.7–15.7)
MCH RBC QN AUTO: 30 PG (ref 26.5–33)
MCHC RBC AUTO-ENTMCNC: 32.8 G/DL (ref 31.5–36.5)
MCV RBC AUTO: 91 FL (ref 78–100)
PLATELET # BLD AUTO: 268 10E3/UL (ref 150–450)
RBC # BLD AUTO: 3.97 10E6/UL (ref 3.8–5.2)
WBC # BLD AUTO: 11.4 10E3/UL (ref 4–11)

## 2023-04-25 PROCEDURE — 90471 IMMUNIZATION ADMIN: CPT | Performed by: STUDENT IN AN ORGANIZED HEALTH CARE EDUCATION/TRAINING PROGRAM

## 2023-04-25 PROCEDURE — 36415 COLL VENOUS BLD VENIPUNCTURE: CPT

## 2023-04-25 PROCEDURE — 76816 OB US FOLLOW-UP PER FETUS: CPT | Performed by: OBSTETRICS & GYNECOLOGY

## 2023-04-25 PROCEDURE — 82950 GLUCOSE TEST: CPT

## 2023-04-25 PROCEDURE — 90715 TDAP VACCINE 7 YRS/> IM: CPT | Performed by: STUDENT IN AN ORGANIZED HEALTH CARE EDUCATION/TRAINING PROGRAM

## 2023-04-25 PROCEDURE — 85027 COMPLETE CBC AUTOMATED: CPT

## 2023-04-25 PROCEDURE — 99207 PR PRENATAL VISIT: CPT | Performed by: STUDENT IN AN ORGANIZED HEALTH CARE EDUCATION/TRAINING PROGRAM

## 2023-04-25 NOTE — NURSING NOTE
Prior to immunization administration, verified patients identity using patient s name and date of birth. Please see Immunization Activity for additional information.     Screening Questionnaire for Adult Immunization    Are you sick today?   No   Do you have allergies to medications, food, a vaccine component or latex?   No   Have you ever had a serious reaction after receiving a vaccination?   No   Do you have a long-term health problem with heart, lung, kidney, or metabolic disease (e.g., diabetes), asthma, a blood disorder, no spleen, complement component deficiency, a cochlear implant, or a spinal fluid leak?  Are you on long-term aspirin therapy?   No   Do you have cancer, leukemia, HIV/AIDS, or any other immune system problem?   No   Do you have a parent, brother, or sister with an immune system problem?   No   In the past 3 months, have you taken medications that affect  your immune system, such as prednisone, other steroids, or anticancer drugs; drugs for the treatment of rheumatoid arthritis, Crohn s disease, or psoriasis; or have you had radiation treatments?   No   Have you had a seizure, or a brain or other nervous system problem?   No   During the past year, have you received a transfusion of blood or blood    products, or been given immune (gamma) globulin or antiviral drug?   No   For women: Are you pregnant or is there a chance you could become       pregnant during the next month?   yes   Have you received any vaccinations in the past 4 weeks?   No     Immunization questionnaire answers were all negative.      Injection of Tdap given by Albania Vaughn CMA. Patient instructed to remain in clinic for 15 minutes afterwards, and to report any adverse reactions.     Screening performed by Albania Vaughn CMA on 4/25/2023 at 9:10 AM.

## 2023-04-25 NOTE — PROGRESS NOTES
Prenatal Care    Kendra Haynes is a 27 year old  at 27w6d by LMP c/w 11w4d US presenting for routine prenatal care. She is doing well. Denies LOF, VB, ctx. +FM.    Conditions affecting pregnancy:  - BMI 36  - Hx of CS x 1    Objective- see flow sheet    Kendra Haynes is a 27 year old  at 27w6d presenting for routine ob visit.       ICD-10-CM    1. Supervision of high risk pregnancy in third trimester  O09.93       2. Previous  delivery, antepartum  O34.219       3. Obesity affecting pregnancy in first trimester  O99.211         - First tri labs wnl. Delcines AFP. Level II US- suboptimal views. Follow-up wnl. Third tri labs today, TDAP today.   - Discussed routine precautions.  - Hx of CS x 1- would like to TOLAC. Address at next visit.  - TWlbs. Pregravid BMI 36. Expect 11-20lbs. On ASA. Growth US today. Growth at 34w- ordered, needs to be scheduled. Weekly BPP at 37w. Reviewed healthy weight gain/exercise.      Follow-up in 4 weeks for routine care.     Sharri Epstein MD, MHS  23

## 2023-05-09 ENCOUNTER — TELEPHONE (OUTPATIENT)
Dept: OBGYN | Facility: CLINIC | Age: 28
End: 2023-05-09

## 2023-05-09 ENCOUNTER — PRENATAL OFFICE VISIT (OUTPATIENT)
Dept: OBGYN | Facility: CLINIC | Age: 28
End: 2023-05-09
Payer: COMMERCIAL

## 2023-05-09 VITALS
WEIGHT: 256.6 LBS | DIASTOLIC BLOOD PRESSURE: 56 MMHG | BODY MASS INDEX: 43.81 KG/M2 | HEIGHT: 64 IN | SYSTOLIC BLOOD PRESSURE: 110 MMHG

## 2023-05-09 DIAGNOSIS — O09.93 SUPERVISION OF HIGH RISK PREGNANCY IN THIRD TRIMESTER: ICD-10-CM

## 2023-05-09 DIAGNOSIS — O34.219 PREVIOUS CESAREAN DELIVERY, ANTEPARTUM: Primary | ICD-10-CM

## 2023-05-09 DIAGNOSIS — O99.211 OBESITY AFFECTING PREGNANCY IN FIRST TRIMESTER: ICD-10-CM

## 2023-05-09 PROCEDURE — 99207 PR PRENATAL VISIT: CPT | Performed by: STUDENT IN AN ORGANIZED HEALTH CARE EDUCATION/TRAINING PROGRAM

## 2023-05-09 NOTE — PROGRESS NOTES
Prenatal Care (29w6d)    Kendra Haynes is a 27 year old  at 29w6d by LMP c/w 11w4d US presenting for routine prenatal care. She is doing well. Finished moving. Some cramping. Denies LOF, VB, ctx. +FM.      Conditions affecting pregnancy:  - BMI 36  - Hx of CS x 1    Objective- see flow sheet    Kendra Haynes is a 27 year old  at 29w6d  presenting for routine ob visit.       ICD-10-CM    1. Previous  delivery, antepartum  O34.219 Case Request: Repeat  Section     Case Request: Repeat  Section      2. Supervision of high risk pregnancy in third trimester  O09.93       3. Obesity affecting pregnancy in first trimester  O99.211         - First tri labs wnl. Delcines AFP. Level II US- suboptimal views. Follow-up wnl. Normal third tri labs. S/p TDAP.   - Discussed routine precautions.  - Hx of CS x 1- discussed TOLAC vs repeat CS. Indication for CS was OP/CPD in last pregnancy. Discussed risk of this recurring in this pregnancy, risks of CS in labor with TOLAC. She is concerned about these risks. She is okay with holding a date for repeat CS. Wants to schedule for  11am. Orders placed.   - TWlbs. Pregravid BMI 36. Expect 11-20lbs. On ASA. Growth US 27w6d: EFW 63%ile. Growth at 34w-scheduled. Weekly BPP at 37w. Reviewed healthy weight gain/exercise.      Follow-up in 2 weeks for routine care.     Sharri Epstein MD, S  23

## 2023-05-09 NOTE — TELEPHONE ENCOUNTER
**Requests 11am time slot for 7/13**    ERAS BAG GIVEN No  ERAS INSTRUCTIONS EXPLAINED No    ASSIST: Bridget    H&P TO BE COMPLETED BY:   Surgeon  FOR H&P TO BE DONE BY SURGEON   UPDATE VISIT ON DATE AT HOSPITAL  SAME DAY/OBSERVATION/INPATIENT: ADMIT  EQUIPMENT: None  VENDOR NEEDED AT CASE: None  IF IUD WHAT BRAND None  LABS/SPECIAL INSTRUCTIONS: None  TIME OFF WORK: 8 weeks  ESTIMATED DOCTOR TIME IN MINUTES 75 minutes  POST OP TO BE SCHEDULED 6 WEEKS AFTER SX. APPOINTMENT LENGTH IN MINUTES 15    WOULD YOU LIKE YOUR PATIENT PRE-PROCEDURE COVID TESTED? No

## 2023-05-10 NOTE — TELEPHONE ENCOUNTER
Type of surgery: 7/13/2023 11a  Location of surgery: [facility]  Date and time of surgery: 7/13/2023 11a  Surgeon: DA Mercado  Pre-Op Appt Date: HOSPITAL  Post-Op Appt Date: 8/25/2023 8:30a   Packet sent out: MAILED 5/10/2023  Pre-cert/Authorization completed:  TBD  Date: 5/10/2023 Daksha begum/Maria A Valdez  Surgery Scheduler

## 2023-05-23 ENCOUNTER — PRENATAL OFFICE VISIT (OUTPATIENT)
Dept: OBGYN | Facility: CLINIC | Age: 28
End: 2023-05-23
Payer: COMMERCIAL

## 2023-05-23 VITALS — DIASTOLIC BLOOD PRESSURE: 66 MMHG | BODY MASS INDEX: 43.94 KG/M2 | WEIGHT: 256 LBS | SYSTOLIC BLOOD PRESSURE: 120 MMHG

## 2023-05-23 DIAGNOSIS — O34.219 PREVIOUS CESAREAN DELIVERY, ANTEPARTUM: ICD-10-CM

## 2023-05-23 DIAGNOSIS — O09.93 SUPERVISION OF HIGH RISK PREGNANCY IN THIRD TRIMESTER: Primary | ICD-10-CM

## 2023-05-23 DIAGNOSIS — O99.211 OBESITY AFFECTING PREGNANCY IN FIRST TRIMESTER: ICD-10-CM

## 2023-05-23 PROCEDURE — 99207 PR PRENATAL VISIT: CPT | Performed by: STUDENT IN AN ORGANIZED HEALTH CARE EDUCATION/TRAINING PROGRAM

## 2023-05-23 NOTE — PROGRESS NOTES
Prenatal Care (31w6d)    Kendra Haynes is a 27 year old  at 31w6d by LMP c/w 11w4d US presenting for routine prenatal care. She is doing well. Denies LOF, VB, ctx. +FM.      Conditions affecting pregnancy:  - BMI 36  - Hx of CS x 1    Objective- see flow sheet    Kendra Haynes is a 27 year old  at 31w6d  presenting for routine ob visit.       ICD-10-CM    1. Supervision of high risk pregnancy in third trimester  O09.93       2. Previous  delivery, antepartum  O34.219       3. Obesity affecting pregnancy in first trimester  O99.211         - First tri labs wnl. Delcines AFP. Level II US- suboptimal views. Follow-up wnl. Normal third tri labs. S/p TDAP.   - Discussed routine precautions.  - Hx of CS x 1- discussed TOLAC vs repeat CS. Indication for CS was OP/CPD in last pregnancy. Discussed risk of this recurring in this pregnancy, risks of CS in labor with TOLAC. She is concerned about these risks. She is okay with holding a date for repeat CS. Wants to schedule for  11am. Orders placed. Still considering, will finalize plan next visit after US.   - TWlbs. Pregravid BMI 36. Expect 11-20lbs. On ASA. Growth US 27w6d: EFW 63%ile. Growth at 34w-scheduled next visit. Weekly BPP at 37w-needs to be ordered. Reviewed healthy weight gain/exercise.      Follow-up in 2-3 weeks for routine care.     Sharri Epstein MD, MHS  23

## 2023-05-23 NOTE — PATIENT INSTRUCTIONS
Understanding  Labor  Going into labor before week 37 of pregnancy is called  labor.  labor can cause your baby to be born too soon. This can lead to health problems for your baby.     Before labor, the cervix is thick and closed.      In  labor, the cervix begins to efface (thin) and dilate (open).     Symptoms of  labor  If you think you re having  labor, get medical help right away. Contractions alone don t mean you re in  labor. What matters more are changes in your cervix. The cervix is the opening at the lower end of the uterus. Symptoms of  labor include:    4 or more contractions per hour    Strong contractions    Constant menstrual-like cramping    Low-back pain    Mucous or bloody fluid from the vagina    Bleeding or spotting in the second or third trimester  Evaluating  labor  Your healthcare provider will try to find out if you re in  labor or just having contractions. They may watch you for a few hours. You may have these tests:    Pelvic exam. This is to see if your cervix has effaced (thinned) and dilated (opened).    Uterine activity monitoring. This is used to detect contractions.    Fetal monitoring. This is done to check the health of your baby.    Ultrasound. This test looks at your baby s size and position.    Amniocentesis. This test checks how mature your baby s lungs are.  Caring for yourself at home  If you have  contractions, but your cervix is still thick and closed, your healthcare provider may tell you to:    Drink plenty of water.    Do fewer activities.    Rest in bed on your side.    Don't have intercourse or stimulate your nipples.  When to call your healthcare provider  Call your healthcare provider if you have any of these:    4 or more contractions per hour    Bag of water breaks    Bleeding or spotting  If you need hospital care   labor often means that you need hospital care. You may need  complete bed rest. You may have an IV (intravenous) line in your arm or hand. This is to give you fluids. You may be given pills or injections. These are done to help prevent contractions. You may get a medicine called a corticosteroid. This is to help your baby s lungs mature more quickly.  Are you at risk?  Any pregnant woman can have  labor. It may start for no reason. But these risk factors can increase your chances:    Past  labor or early birth    Smoking, drug, or alcohol use in pregnancy    A multiple pregnancy (twins or more)    Problems with the shape of the uterus    Bleeding during the pregnancy  The dangers of  birth  A baby born too soon may have health problems. This is because the baby didn t have enough time to grow. Some of the risks for your baby include:    Not breastfeeding or feeding well    Having immature lungs    Bleeding in the brain    Death  Reaching term  Your goal is to get as close to term (week 37 or later) as you can before giving birth. The closer you get to term, the higher your chance of having a healthy baby. Work with your healthcare provider. Together, you can take steps that may keep you from giving birth too early.  [employer] last reviewed this educational content on 10/1/2021    2035-8292 [employer]. All rights reserved. This information is not intended as a substitute for professional medical care. Always follow your healthcare professional's instructions.          Adapting to Pregnancy: Third Trimester  Although common during pregnancy, some discomforts may seem worse in the final weeks. Simple lifestyle changes can help. Take care of yourself. And ask your partner to help out with small tasks.   Limiting leg problems  Ways to combat leg issues:    Wear support hose all day.    Don't wear snug shoes or clothes that bind, such as tight pants and socks with elastic tops.    Sit with your feet and legs raised often.  Caring for your  breasts  Tips to follow include:    Wash with plain water. Don't use harsh soaps or rubbing alcohol. They may cause dryness.    Wear a nursing bra for extra support. It can also hide any leaks from your nipples.  Controlling hemorrhoids  Ways to prevent hemorrhoids include:     Eat foods that are high in fiber. Also exercise and drink enough fluids. This will reduce constipation and hemorrhoids.    Sleep and nap on your side. This limits pressure on the veins of your rectum.    Try not to stand or sit for long periods.  Controlling back pain  As your body changes during pregnancy, your back must work in new ways. Back pain has many causes. Physical changes in your body can strain your back and its supporting muscles. Also hormones increase during pregnancy. This can affect how your muscles and joints work together. All of these changes can lead to pain.   Pain may be felt in the upper or lower back. Pain is also common in the pelvis. Some pregnant women have sciatica. This is pain caused by pressure on the sciatic nerve running down the back of the leg. Ice or heat may help. Your provider may advise massage therapy or a chiropractor. Sleep on your left side with a pillow between your knees. Use a brace or support device. Ask your provider for specific tips and exercises to help control your back pain.   Tips to help you rest  Good rest and sleep will help you feel better. Here are some ideas:     Ask your partner to massage your shoulders, neck, or back.    Limit the errands you do each day.    Lie down in the afternoon or after work for a few minutes.    Take a warm bath before you go to sleep.    Drink warm milk or teas without caffeine.    Don't drink coffee, black tea, and cola.  Stopping heartburn    Don't eat spicy, greasy, fried, or acidic foods.    Eat small amounts more often. Eat slowly.   Wait 2 hours after eating before lying down.    Sleep with your upper body raised 6 inches.   Managing mood  swings  Ways to manage mood swings include:     Know that mood changes are normal.    Exercise often, but get plenty of rest.    Address any concerns and limit stress. Talking to your partner, other women, or your healthcare provider may help.   Dealing with urinary frequency  Tips to deal with having to urinate often include:     Drink plenty of water all day. But if you drink a lot in the evening, you may have to get up more in the night.    Limit coffee, black tea, and cola.  How daily issues affect your health  Many things in your daily life impact your health. This can include transportation, money problems, housing, access to food, and . If you can t get to medical appointments, you may not receive the care you need. When money is tight, it may be difficult to pay for medicines. And living far from a grocery store can make it hard to buy healthy food.   If you have concerns in any of these or other areas, talk with your healthcare team. They may know of local resources to assist you. Or they may have a staff person who can help.   [employer] last reviewed this educational content on 7/1/2021 2000-2022 [employer]. All rights reserved. This information is not intended as a substitute for professional medical care. Always follow your healthcare professional's instructions.

## 2023-06-12 ENCOUNTER — ANCILLARY PROCEDURE (OUTPATIENT)
Dept: ULTRASOUND IMAGING | Facility: CLINIC | Age: 28
End: 2023-06-12
Attending: STUDENT IN AN ORGANIZED HEALTH CARE EDUCATION/TRAINING PROGRAM
Payer: COMMERCIAL

## 2023-06-12 ENCOUNTER — PRENATAL OFFICE VISIT (OUTPATIENT)
Dept: OBGYN | Facility: CLINIC | Age: 28
End: 2023-06-12
Attending: STUDENT IN AN ORGANIZED HEALTH CARE EDUCATION/TRAINING PROGRAM
Payer: COMMERCIAL

## 2023-06-12 VITALS — BODY MASS INDEX: 44.46 KG/M2 | DIASTOLIC BLOOD PRESSURE: 68 MMHG | WEIGHT: 259 LBS | SYSTOLIC BLOOD PRESSURE: 118 MMHG

## 2023-06-12 DIAGNOSIS — O09.93 SUPERVISION OF HIGH RISK PREGNANCY IN THIRD TRIMESTER: Primary | ICD-10-CM

## 2023-06-12 DIAGNOSIS — O99.211 OBESITY AFFECTING PREGNANCY IN FIRST TRIMESTER: ICD-10-CM

## 2023-06-12 PROCEDURE — 76816 OB US FOLLOW-UP PER FETUS: CPT | Performed by: STUDENT IN AN ORGANIZED HEALTH CARE EDUCATION/TRAINING PROGRAM

## 2023-06-12 PROCEDURE — 99207 PR PRENATAL VISIT: CPT | Performed by: STUDENT IN AN ORGANIZED HEALTH CARE EDUCATION/TRAINING PROGRAM

## 2023-06-12 NOTE — PATIENT INSTRUCTIONS
Schedule a weekly ultrasound starting in two weeks.     The Benefits of Breastmilk  Breastmilk is the best food for your baby. It has just the right amount of nutrients. It protects your baby's digestive system. It protects other body systems in your baby. And it helps them grow and develop.       Healthiest for baby  Breastmilk is the ideal food for babies. It has all the nutrients your baby needs to grow healthy and strong.    Breastmilk has these benefits:  It lowers the risk for sudden infant death syndrome (SIDS).  It gives babies a lower risk for ear infections in their first year. This is compared to babies who are fed formula.  It has DHA. This is a type of fat. It helps your baby s growing brain, nervous system, and eyes.  It is full of antibodies. These help your baby fight infection.  It lowers your baby's risk for lung illness. It lowers their risk of diarrhea.  It lowers your baby s risk for allergies. Babies fed formula are more likely to have an allergy to cow's milk.  It lowers your baby s risk for colds and many other diseases.  It changes as your baby grows. This meets your baby's changing needs.  And it s important to know that:  Giving only breastmilk for the first 6 months gives your baby more of these benefits.  Giving breastmilk plus solid food from 6 months to 1 year or more gives more benefits.   babies have fewer long-term health problems when they grow up. These problems include diabetes and obesity.  Breastfeeding gives contact that your baby loves. Spending time skin-to-skin with you is calming and comforting.  Healthiest for mom  For many people, breastfeeding is a good experience. It creates a strong bond between mother and baby. People who breastfeed also get health benefits. Some benefits for you include:   You can know that your baby is growing healthy and strong because of your milk.  Breastmilk is convenient. It's free and clean. It's always at the right  temperature.  Breastfeeding burns calories. This can help you lose pregnancy weight faster.  Breastfeeding releases hormones that contract the uterus. This helps the uterus return to its normal size after childbirth.  Mothers who breastfeed have a lower risk for ovarian and breast cancers.  Some studies have found that breastfeeding may reduce a person's risk for type 2 diabetes and rheumatoid arthritis. It may reduce the risk of cardiovascular disease. This includes high blood pressure and high cholesterol.  Breastfeeding every day delays the return of your menstrual period. This can help extend the time between pregnancies.  Many people can help you learn to breastfeed. A lactation consultant can help. This is a healthcare provider who is trained to help you breastfeed. Your nurse, midwife, nurse practitioner, obstetrician, pediatrician, or family practice doctor can also help you learn about breastfeeding.   What does it mean to give only breastmilk?   Giving only breastmilk for at least the first 6 months of life is best for your baby. Feeding your baby from your breasts is best. If you need to be away from your baby, you can express breastmilk. This means pumping milk from your breast into a container. Talk with your healthcare provider about the best ways to feed this milk to your baby.    You should not give your baby water, sugar water, formula, or solids during their first 6 months unless your baby's healthcare provider tells you to.   Your baby s provider may tell you to give your baby vitamins, minerals, or medicines.  babies should be given vitamin D supplements. The provider will tell you the type and amount of vitamin D to give your baby.   What are the risks of not giving only breastmilk?   You now know the many of the benefits of breastfeeding. But you might not know why it's important to give only breastmilk for at least 6 months.   Your baby gets the best protection against health problems  when they get only breastmilk. Breastfeeding some of the time is good. But breastfeeding all of the time is best.   Giving your baby formula or other liquids may cause you to:  Have more problems breastfeeding  Make less milk  Be less confident in breastfeeding  Breastfeed less often  Stop breastfeeding before your baby is at least 12 months old                                                     When other options may be needed  Giving only breastmilk is almost always the best thing to do. But your healthcare provider may have reasons to advise giving your baby formula or other liquids. They include:   Your baby has a health problem. There are cases where you may need to add formula or other liquids. This is often only for a short time. This may be the case if your baby has low blood sugar (hypoglycemia), loses body fluids (dehydration), or has high levels of bilirubin.  You have certain health problems. Some infections can be passed from your skin to your baby's skin. Or it can pass through your breastmilk. People with HIV/AIDS or untreated and contagious TB (tuberculosis) should not breastfeed. Women with active skin sores from chickenpox (varicella) can pump their breastmilk and feed their baby. But they should keep their baby s skin from touching any of the sores.  You use illegal drugs or drink alcohol. People who use illegal drugs should not breastfeed. If you are going to have a drink that has alcohol, it's best to do so just after you nurse or pump milk. Breastfeeding or pumping breast milk is OK at least 4 hours after your last drink. That way, your body will have some time to get rid of the alcohol before the next feeding. Less of it will reach your baby. Long-term exposure to alcohol in breastmilk may affect your baby's health. It may also cause you to make less milk.  You take certain medicines. If you take any medicines, ask your baby s healthcare provider if you can breastfeed.  [employer] last reviewed  this educational content on 2020-2022 [employer]. All rights reserved. This information is not intended as a substitute for professional medical care. Always follow your healthcare professional's instructions.          What Is Group B Strep?  Group B strep (streptococcus) is a common type of bacteria. It can grow in a woman s vagina, rectum, or urinary tract. It most often does not cause harm in adults. But in rare cases, a woman who has group B strep can infect her baby during the birth. This can cause serious illness in the . But treating the mother during labor reduces the risk of the baby becoming infected. And if a  gets group B strep, the infection can be treated.     Facts about group B strep   Learning more about group B strep can help you understand how testing and treatment can help. Here are some basic facts about group B strep:   It is not a sexually transmitted disease.  It is not the same as strep throat. (That is caused by group A strep.)  It often has no symptoms. It may cause no problems in adults.  Test results can be misleading. They may be negative one week and positive the next week.  Group B strep can be spread to the baby during vaginal delivery. It cannot be passed during  (surgical) birth.  A mother with group B strep rarely infects her . (Infection happens only about 1% to 2% of the time.)  When a mother is treated during labor and delivery, her baby almost never becomes infected.  Certain factors during pregnancy increase the risk of a baby becoming infected.  Possible effects on your baby   Group B strep can infect the blood. It can also cause inflammation of the baby s lungs, brain, or spinal cord. Long-term effects can include blindness, deafness, mental retardation, or cerebral palsy. And in rare cases, infection causes death. Infection is most often found soon after the baby is born.   How your baby may become infected   Group B  strep often lives in the vagina or rectum. If the amniotic sac breaks early, bacteria from the vagina can travel to the uterus, reaching the baby. Or, as the baby passes through the birth canal, it can come in contact with the bacteria. In rare cases, group B strep can be passed to the baby after delivery. This is called late-onset group B strep. The source of this type of infection is not well understood. But some experts believe that it happens if the baby is exposed to group B strep in the home, from the parents or siblings, or in the community.   What increases the risk?   Certain risk factors increase the chance that a baby will be infected. They include:   Breaking or leaking of the amniotic sac before 37 weeks of pregnancy  Labor before 37 weeks of pregnancy  Breaking of the amniotic sac more than 18 hours before labor starts  Fever during labor  A urinary tract infection with group B strep at any point in the pregnancy  Having a previous baby born with a group B strep infection  [employer] last reviewed this educational content on 2020-2022 [employer]. All rights reserved. This information is not intended as a substitute for professional medical care. Always follow your healthcare professional's instructions.          Vitamin K Deficiency Bleeding (VKDB) in a [place] Baby  What is vitamin K deficiency bleeding in a ?  Vitamin K deficiency bleeding (VKDB) is a problem that occurs in some  babies. It most often happens during the first few days and weeks of life. But it can occur up to 6 months of age. This condition used to be called hemorrhagic disease of the .    What causes vitamin K deficiency bleeding in a ?  Babies are normally born with low levels of vitamin K. Vitamin K is needed for blood to clot. Not having enough vitamin K is the main cause of vitamin deficiency bleeding. If your baby s blood doesn t clot, they may have severe bleeding or a  hemorrhage. This can be life-threatening. The cause of vitamin K deficiency depends on the 3 types of VKDB:   Early VKDB. This can occur right after birth or up to 24 hours of age. It's caused by certain medicines the mother took during pregnancy  Classical VKDB. This occurs from 1 to 7 days after birth. It's caused by low levels of vitamin K found in newborns.  Late VKDB. This most often occurs up to 3 months after birth. But it can occur up to 6 months after birth. It can occur in a baby who did not get a vitamin K shot at birth and who was  only.    Which newborns are at risk for vitamin K deficiency bleeding?   These things may make it more likely for a baby to have this condition:   Not getting a vitamin K shot at birth.The American Academy of Pediatrics recommends that all newborns get a vitamin K shot. This can prevent severe bleeding.   Being  only and not getting a vitamin K shot at birth.  Breastmilk has less vitamin K than formula made with cow s milk. The vitamin K shot will provide what a  baby needs. A mother who takes a vitamin K supplement while breastfeeding will not raise her baby's vitamin K level.  Being born to a mother who took certain medicines during pregnancy.  These include medicines for seizures (anticonvulsants) and medicines for blood-clotting problems (anticoagulants).  What are the symptoms of vitamin K deficiency bleeding in a ?   Symptoms can occur a bit differently in each child. They can include:   Blood in your baby's stool that make it black and sticky (tarry)  Blood in your baby's urine  Oozing of blood from around your baby s umbilical cord or circumcision site  Bruising more easily than normal. This may happen around your baby's head and face.  Beingvery sleepy or fussy. In severe cases, vitamin K deficiency may cause bleeding in and around the brain. Other signs of bleeding in the brain can include seizures or vomiting, not just spitting  up.  The symptoms of this condition may be similar to symptoms of other health issues. Make sure your child sees a healthcare provider for a diagnosis.   How is vitamin K deficiency bleeding in a  diagnosed?   The healthcare provider will look at your baby's health history. They will also check your baby for signs of bleeding. Your baby may need lab tests to measure their blood clotting times. The results of these tests can help your child s healthcare provider make the diagnosis.   How is vitamin K deficiency bleeding in a  treated?   Treatment will depend on your child s symptoms, age, and general health. It will also depend on how severe the condition is.   Your baby will probably get a vitamin K shot.   Your baby may need a blood transfusion if they have severe bleeding. If your baby is severely ill, they may need to be treated in the intensive care unit (ICU).   What are possible complications of vitamin K deficiency bleeding in a ?   Vitamin K deficiency bleeding can lead to life-threatening problems. These include dangerous bleeding that can lead to brain damage or death. In the [place] deaths and long-term complications from vitamin K deficiency have been greatly lowered because of vitamin K shots given at birth. But bleeding into the brain, central nervous system, stomach, intestines, or other parts of the body can cause serious problems, or even death.   Can vitamin K deficiency bleeding in a  be prevented?   This condition can be prevented. The American Academy of Pediatrics recommends that all newborns get a vitamin K shot. Your child will get a shot into their upper leg (thigh) muscle. This shot will be given soon after birth. This will prevent dangerous bleeding.   Key points about vitamin K deficiency bleeding in a    Vitamin K deficiency bleeding is a problem that occurs in some newborns. It often happens during the first few days of life.  Babies are normally born  with low levels of vitamin K. Not having enough vitamin K is the main cause of this condition.  Your child s healthcare provider will diagnose this condition. This will be based on your child s signs of bleeding and lab tests for blood clotting times.  The American Academy of Pediatrics recommends that all newborns get a vitamin K shot. This can prevent this condition.     Next steps  Tips to help you get the most from a visit to your child s healthcare provider:   Know the reason for the visit and what you want to happen.  Before your visit, write down questions you want answered.  At the visit, write down the name of a new diagnosis, and any new medicines, treatments, or tests. Also write down any new instructions your provider gives you for your child.  Know why a new medicine or treatment is prescribed and how it will help your child. Also know what the side effects are.  Ask if your child s condition can be treated in other ways.  Know why a test or procedure is recommended and what the results could mean.  Know what to expect if your child does not take the medicine or have the test or procedure.  If your child has a follow-up appointment, write down the date, time, and purpose for that visit.  Know how you can contact your child s provider after office hours. This is important if your child becomes ill and you have questions or need advice.  [employer] last reviewed this educational content on 4/1/2022 2000-2022 [employer]. All rights reserved. This information is not intended as a substitute for professional medical care. Always follow your healthcare professional's instructions.          Circumcision for Children  What is circumcision for children?  Circumcision is a surgery to remove the skin covering the end of the penis. This skin is called the foreskin. This surgery is most often done 1 or 2 days after a baby s birth. Circumcision can also be done on older children. This can be more  complex. An older child may need medicine (general anesthesia) to put them to sleep during the procedure.   Why might my child need circumcision?  In some cultures, circumcision is a Yazidism practice or a tradition. It's most common in Mormon and Islamic faiths. In the [place]  circumcision isn't required. It's an elective procedure. This means you can choose to have your child circumcised or not. Circumcision is often done 1 to 2 days after birth. It's helpful to decide before your baby is born.   It's important to learn about the benefits and risks of circumcision. According to the American Academy of Pediatrics (AAP):   Problems with the penis (such as irritation) can happen with or without circumcision.  There is no difference in health and cleanliness (hygiene) with or without circumcision, as long as a child can handle cleaning and care.  There is a higher risk of urinary tract infection (UTI) in uncircumcised children. This is more so in babies younger than 1 year old. But the risk for UTI in all children is less than 1%.   circumcision does give some protection from cancer of the penis later in life. But the overall risk of penile cancer is very low in developed countries, such as the [place]  Circumcised kids and adults have a lower risk for some sexually transmitted infections. This includes HIV.  The AAP has found that the health benefits of circumcision are greater than the risks. But the AAP also found that these benefits are not great enough to advise that all  babies be circumcised. Parents must decide what's best for their baby.   What are the risks of circumcision for a child?  Circumcision has some risks. But the rate of problems is low. The most common risks are bleeding and infection.   The skin of the penis is also very sensitive after a circumcision. The area can get irritated from contact with the baby s diaper or with the ammonia in urine. This can be treated by putting  petroleum jelly on the penis for a few days.                                         There may be other risks. This depends on your baby s health. Talk about any concerns you have with the healthcare provider before the surgery.   How do I help my child get ready for circumcision?  Make sure the healthcare provider fully explains the procedure. Ask if anesthetic is used for a circumcision. The AAP advises anesthetic. This helps reduce a baby s pain during the procedure.   If your baby is born early or has other health problems, they may not be circumcised until they're ready to leave the hospital. If your baby has a physical problem with their penis, they may not be circumcised. This is because the foreskin is used in a future surgery on the penis.   What happens during circumcision for a child?  The procedure is usually done by an obstetrician or pediatrician in the hospital. When it's done for [facility] reasons, other people may do the surgery after the baby comes home from the hospital.   Circumcision is done only on healthy babies. The procedure is painful. So the AAP advises using a local anesthetic. This numbs the area of the penis where the incision will be made. There are different types of anesthetic. A healthcare provider may put a numbing cream on your child s penis. Or they may inject small amounts of anesthetic around the penis. There are risks with any anesthetic, but these are considered safe. In addition to the anesthetic, your provider may give your baby a pacifier dipped in sugar water. This can help soothe them while the procedure is happening.   A circumcision can be done in several ways. The procedure usually takes about 15 minutes or less. The procedure goes like this:   The healthcare provider will give your baby a local anesthetic.  The provider then cleans the penis with an antiseptic.  The provider will gently loosen the foreskin from around the head of the penis, making a small slit in the  foreskin.  The provider may use 1 of the common methods to remove the foreskin. These methods use devices that help protect the penis while removing the foreskin.  The provider may attach a clamp over the head of the penis. Or the provider may place a plastic ring over the head of the penis. This makes it easier to cut the foreskin.  The provider will use surgical tools to remove the foreskin. This exposes the end of the penis.  The provider may place some petroleum jelly or ointment on the head of the penis and cover it with a loose gauze dressing.  What happens after circumcision for a child?  After the circumcision, you'll need to care for your baby s penis until it heals. This includes cleaning the area with plain water at least once a day. You'll also need to clean it if the area is dirty after a bowel movement. Then let the area dry, and put petroleum jelly on it. This keeps the gauze dressing from sticking.   You may be asked to remove the dressing the next day. Or you may be asked to use a new dressing, and some petroleum jelly, each time you change diapers. When the gauze dressing is no longer needed, you may be told to keep putting petroleum jelly on the end of the penis for a few more days. This helps prevent the penis from sticking to the diaper.   Some swelling on the penis is normal. It's also normal for the penis to develop a crust. This will go away after a few days. A small amount of bleeding may occur. But if you see a blood stain on your baby s diaper that's bigger than a quarter, call the healthcare provider right away. If the penis keeps bleeding, apply firm pressure with a washcloth for a few minutes. Then look to see if the bleeding has stopped. If the bleeding continues, bring your child to the emergency room.   If a plastic ring was used, it should fall off in 10 to 12 days. Tell your healthcare provider if this doesn t happen.   A baby s penis usually fully heals from a circumcision in 7 to  "10 days.   Call your child s healthcare provider if your baby has any of the following:   Fever (see \"Fever and children\" below)  Wound that doesn t stop bleeding  No urine 6 to 8 hours after the procedure  Redness or swelling that doesn t get better after 3 days, or gets worse  Yellow discharge or yellow coating on the penis after 7 days  Fever and children  Use a digital thermometer to check your child s temperature. Don t use a mercury thermometer. There are different kinds and uses of digital thermometers. They include:   Rectal. For children younger than 3 years, a rectal temperature is the most accurate.  Forehead (temporal). This works for children age 3 months and older. If a child under 3 months old has signs of illness, this can be used for a first pass. The provider may want to confirm with a rectal temperature.  Ear (tympanic). Ear temperatures are accurate after 6 months of age, but not before.  Armpit (axillary). This is the least reliable but may be used for a first pass to check a child of any age with signs of illness. The provider may want to confirm with a rectal temperature.  Mouth (oral). Don t use a thermometer in your child s mouth until he or she is at least 4 years old.  Use the rectal thermometer with care. Follow the product maker s directions for correct use. Insert it gently. Label it and make sure it s not used in the mouth. It may pass on germs from the stool. If you don t feel OK using a rectal thermometer, ask the healthcare provider what type to use instead. When you talk with any healthcare provider about your child s fever, tell him or her which type you used.   Below are guidelines to know if your young child has a fever. Your child s healthcare provider may give you different numbers for your child. Follow your provider s specific instructions.   Fever readings for a baby under 3 months old:   First, ask your child s healthcare provider how you should take the " temperature.  Rectal or forehead: 100.4 F (38 C) or higher  Armpit: 99 F (37.2 C) or higher  Fever readings for a child age 3 months to 36 months (3 years):   Rectal, forehead, or ear: 102 F (38.9 C) or higher  Armpit: 101 F (38.3 C) or higher  Call the healthcare provider in these cases:   Repeated temperature of 104 F (40 C) or higher in a child of any age  Fever of 100.4 F (38 C) or higher in baby younger than 3 months  Fever that lasts more than 24 hours in a child under age 2  Fever that lasts for 3 days in a child age 2 or older  Next steps  Before you agree to the test or the procedure for your child make sure you know:   The name of the test or procedure  The reason your child is having the test or procedure  What results to expect and what they mean  The risks and benefits of the test or procedure  When and where your child is to have the test or procedure  Who will do the procedure and what that person s qualifications are  What would happen if your child did not have the test or procedure  Any alternative tests or procedures to think about  When and how will you get the results  Who to call after the test or procedure if you have questions or your child has problems  How much will you have to pay for the test or procedure  [employer] last reviewed this educational content on 3/1/2022    4545-4368 [employer]. All rights reserved. This information is not intended as a substitute for professional medical care. Always follow your healthcare professional's instructions.

## 2023-06-12 NOTE — PROGRESS NOTES
Follow Up (Follow up growth US 34w5d)    Kendra Haynes is a 27 year old  at 34w5d by LMP c/w 11w4d US presenting for routine prenatal care. She is doing well. Denies LOF, VB, ctx. +FM.       Conditions affecting pregnancy:  - BMI 36  - Hx of CS x 1    Objective- see flow sheet    Kendra Haynes is a 27 year old  at 34w5d presenting for routine ob visit.       ICD-10-CM    1. Supervision of high risk pregnancy in third trimester  O09.93 US Fetal Biophys Prof w/o Non Stress Test      2. Obesity affecting pregnancy in first trimester  O99.211         - First tri labs wnl. Delcines AFP. Level II US- suboptimal views. Follow-up wnl. Normal third tri labs. S/p TDAP. GBS next visit.   - Discussed routine precautions.  - Hx of CS x 1- discussed TOLAC vs repeat CS. Indication for CS was OP/CPD in last pregnancy. Discussed risk of this recurring in this pregnancy, risks of CS in labor with TOLAC. She is concerned about these risks. Large AC/HC on today's growth. Wants to schedule for  11am. Orders placed.   - TWlbs. Pregravid BMI 36. Expect 11-20lbs. On ASA. Growth US 27w6d: EFW 63%ile. Growth today: 82.5%ile. Weekly BPP at 37w-needs to be scheduled. Reviewed healthy weight gain/exercise.      Follow-up in 2 weeks for routine care.     Sharri Epstein MD, S  23

## 2023-06-26 ENCOUNTER — PRENATAL OFFICE VISIT (OUTPATIENT)
Dept: OBGYN | Facility: CLINIC | Age: 28
End: 2023-06-26
Payer: COMMERCIAL

## 2023-06-26 ENCOUNTER — ANCILLARY PROCEDURE (OUTPATIENT)
Dept: ULTRASOUND IMAGING | Facility: CLINIC | Age: 28
End: 2023-06-26
Attending: STUDENT IN AN ORGANIZED HEALTH CARE EDUCATION/TRAINING PROGRAM
Payer: COMMERCIAL

## 2023-06-26 VITALS — WEIGHT: 256 LBS | DIASTOLIC BLOOD PRESSURE: 72 MMHG | SYSTOLIC BLOOD PRESSURE: 112 MMHG | BODY MASS INDEX: 43.94 KG/M2

## 2023-06-26 DIAGNOSIS — O09.93 SUPERVISION OF HIGH RISK PREGNANCY IN THIRD TRIMESTER: Primary | ICD-10-CM

## 2023-06-26 DIAGNOSIS — O09.93 SUPERVISION OF HIGH RISK PREGNANCY IN THIRD TRIMESTER: ICD-10-CM

## 2023-06-26 DIAGNOSIS — Z36.85 ANTENATAL SCREENING FOR STREPTOCOCCUS B: ICD-10-CM

## 2023-06-26 PROCEDURE — 87653 STREP B DNA AMP PROBE: CPT | Performed by: STUDENT IN AN ORGANIZED HEALTH CARE EDUCATION/TRAINING PROGRAM

## 2023-06-26 PROCEDURE — 59426 ANTEPARTUM CARE ONLY: CPT | Performed by: STUDENT IN AN ORGANIZED HEALTH CARE EDUCATION/TRAINING PROGRAM

## 2023-06-26 PROCEDURE — 76819 FETAL BIOPHYS PROFIL W/O NST: CPT

## 2023-06-26 PROCEDURE — 99207 PR PRENATAL VISIT: CPT | Performed by: STUDENT IN AN ORGANIZED HEALTH CARE EDUCATION/TRAINING PROGRAM

## 2023-06-26 NOTE — PROGRESS NOTES
Prenatal Care    Kendra Haynes is a 27 year old  at 36w5d by LMP c/w 11w4d US presenting for routine prenatal care. She is doing okay. More active this week,  is out of town. Increased left sided hip pain. Denies LOF, VB, ctx. +FM.     Conditions affecting pregnancy:  - BMI 36  - Hx of CS x 1    Objective- see flow sheet    Kendra Haynes is a 27 year old  at 36w5d presenting for routine ob visit.       ICD-10-CM    1. Supervision of high risk pregnancy in third trimester  O09.93       2.  screening for streptococcus B  Z36.85 Group B strep PCR        - First tri labs wnl. Delcines AFP. Level II US- suboptimal views. Follow-up wnl. Normal third tri labs. S/p TDAP. GBS today.   - Discussed routine precautions. Recommend maternity belt. If not working to help with pain, can consider PT.   - Hx of CS x 1- discussed TOLAC vs repeat CS. Indication for CS was OP position/CPD in last pregnancy. Discussed risk of this recurring in this pregnancy, risks of CS in labor with TOLAC. She is concerned about these risks. Large AC/HC on today's growth. Wants to schedule repeat. Scheduled for  11am.   - TWlbs. Pregravid BMI 36. Expect 11-20lbs. Reviewed healthy weight gain/exercise. On ASA. Growth US 27w6d: EFW 63%ile. Growth 34w: 82.5%ile. Weekly BPP at 37w- 8/8, normal MVP today.      Follow-up weekly for routine care.     Sharri Epstein MD, MHS  23

## 2023-06-27 LAB — GP B STREP DNA SPEC QL NAA+PROBE: POSITIVE

## 2023-06-28 ENCOUNTER — APPOINTMENT (OUTPATIENT)
Dept: ULTRASOUND IMAGING | Facility: CLINIC | Age: 28
DRG: 833 | End: 2023-06-28
Attending: OBSTETRICS & GYNECOLOGY
Payer: COMMERCIAL

## 2023-06-28 ENCOUNTER — NURSE TRIAGE (OUTPATIENT)
Dept: NURSING | Facility: CLINIC | Age: 28
End: 2023-06-28
Payer: COMMERCIAL

## 2023-06-28 ENCOUNTER — HOSPITAL ENCOUNTER (INPATIENT)
Facility: CLINIC | Age: 28
LOS: 1 days | Discharge: HOME | DRG: 833 | End: 2023-06-28
Attending: OBSTETRICS & GYNECOLOGY | Admitting: OBSTETRICS & GYNECOLOGY
Payer: COMMERCIAL

## 2023-06-28 VITALS
TEMPERATURE: 98.1 F | HEART RATE: 103 BPM | SYSTOLIC BLOOD PRESSURE: 105 MMHG | RESPIRATION RATE: 18 BRPM | WEIGHT: 254 LBS | HEIGHT: 64 IN | BODY MASS INDEX: 43.36 KG/M2 | DIASTOLIC BLOOD PRESSURE: 51 MMHG

## 2023-06-28 DIAGNOSIS — M54.9 BACK PAIN AFFECTING PREGNANCY IN THIRD TRIMESTER: Primary | ICD-10-CM

## 2023-06-28 DIAGNOSIS — O99.891 BACK PAIN AFFECTING PREGNANCY IN THIRD TRIMESTER: Primary | ICD-10-CM

## 2023-06-28 PROBLEM — Z36.89 ENCOUNTER FOR TRIAGE IN PREGNANT PATIENT: Status: ACTIVE | Noted: 2023-06-28

## 2023-06-28 LAB
ABO/RH(D): NORMAL
ALBUMIN SERPL BCG-MCNC: 2.9 G/DL (ref 3.5–5.2)
ALBUMIN UR-MCNC: 50 MG/DL
ALP SERPL-CCNC: 127 U/L (ref 35–104)
ALT SERPL W P-5'-P-CCNC: 23 U/L (ref 0–50)
ANION GAP SERPL CALCULATED.3IONS-SCNC: 13 MMOL/L (ref 7–15)
ANTIBODY SCREEN: NEGATIVE
APPEARANCE UR: ABNORMAL
APTT PPP: 26 SECONDS (ref 22–38)
AST SERPL W P-5'-P-CCNC: 31 U/L (ref 0–45)
BILIRUB SERPL-MCNC: 0.5 MG/DL
BILIRUB UR QL STRIP: NEGATIVE
BUN SERPL-MCNC: 4.2 MG/DL (ref 6–20)
CALCIUM SERPL-MCNC: 8.1 MG/DL (ref 8.6–10)
CHLORIDE SERPL-SCNC: 104 MMOL/L (ref 98–107)
COLOR UR AUTO: YELLOW
CREAT SERPL-MCNC: 0.45 MG/DL (ref 0.51–0.95)
DEPRECATED HCO3 PLAS-SCNC: 18 MMOL/L (ref 22–29)
ERYTHROCYTE [DISTWIDTH] IN BLOOD BY AUTOMATED COUNT: 12.4 % (ref 10–15)
FIBRINOGEN PPP-MCNC: 435 MG/DL (ref 170–490)
GFR SERPL CREATININE-BSD FRML MDRD: >90 ML/MIN/1.73M2
GLUCOSE SERPL-MCNC: 88 MG/DL (ref 70–99)
GLUCOSE UR STRIP-MCNC: 30 MG/DL
HCT VFR BLD AUTO: 37.2 % (ref 35–47)
HGB BLD-MCNC: 12.1 G/DL (ref 11.7–15.7)
HGB UR QL STRIP: NEGATIVE
HOLD SPECIMEN: NORMAL
HOLD SPECIMEN: NORMAL
INR PPP: 0.99 (ref 0.85–1.15)
KETONES UR STRIP-MCNC: >150 MG/DL
LEUKOCYTE ESTERASE UR QL STRIP: ABNORMAL
MCH RBC QN AUTO: 28.5 PG (ref 26.5–33)
MCHC RBC AUTO-ENTMCNC: 32.5 G/DL (ref 31.5–36.5)
MCV RBC AUTO: 88 FL (ref 78–100)
MUCOUS THREADS #/AREA URNS LPF: PRESENT /LPF
NITRATE UR QL: NEGATIVE
PH UR STRIP: 6 [PH] (ref 5–7)
PLATELET # BLD AUTO: 242 10E3/UL (ref 150–450)
POTASSIUM SERPL-SCNC: 3.1 MMOL/L (ref 3.4–5.3)
PROT SERPL-MCNC: 5.5 G/DL (ref 6.4–8.3)
RBC # BLD AUTO: 4.24 10E6/UL (ref 3.8–5.2)
RBC URINE: 2 /HPF
SODIUM SERPL-SCNC: 135 MMOL/L (ref 136–145)
SP GR UR STRIP: 1.03 (ref 1–1.03)
SPECIMEN EXPIRATION DATE: NORMAL
SQUAMOUS EPITHELIAL: 15 /HPF
UROBILINOGEN UR STRIP-MCNC: NORMAL MG/DL
WBC # BLD AUTO: 10 10E3/UL (ref 4–11)
WBC URINE: 5 /HPF

## 2023-06-28 PROCEDURE — 85730 THROMBOPLASTIN TIME PARTIAL: CPT | Performed by: OBSTETRICS & GYNECOLOGY

## 2023-06-28 PROCEDURE — 81001 URINALYSIS AUTO W/SCOPE: CPT | Performed by: OBSTETRICS & GYNECOLOGY

## 2023-06-28 PROCEDURE — 250N000011 HC RX IP 250 OP 636: Performed by: OBSTETRICS & GYNECOLOGY

## 2023-06-28 PROCEDURE — 76770 US EXAM ABDO BACK WALL COMP: CPT

## 2023-06-28 PROCEDURE — 86901 BLOOD TYPING SEROLOGIC RH(D): CPT | Performed by: OBSTETRICS & GYNECOLOGY

## 2023-06-28 PROCEDURE — 36415 COLL VENOUS BLD VENIPUNCTURE: CPT | Performed by: OBSTETRICS & GYNECOLOGY

## 2023-06-28 PROCEDURE — 85610 PROTHROMBIN TIME: CPT | Performed by: OBSTETRICS & GYNECOLOGY

## 2023-06-28 PROCEDURE — 250N000013 HC RX MED GY IP 250 OP 250 PS 637

## 2023-06-28 PROCEDURE — 80053 COMPREHEN METABOLIC PANEL: CPT | Performed by: STUDENT IN AN ORGANIZED HEALTH CARE EDUCATION/TRAINING PROGRAM

## 2023-06-28 PROCEDURE — 36415 COLL VENOUS BLD VENIPUNCTURE: CPT | Performed by: STUDENT IN AN ORGANIZED HEALTH CARE EDUCATION/TRAINING PROGRAM

## 2023-06-28 PROCEDURE — G0463 HOSPITAL OUTPT CLINIC VISIT: HCPCS | Mod: 25

## 2023-06-28 PROCEDURE — 120N000012 HC R&B POSTPARTUM

## 2023-06-28 PROCEDURE — 250N000011 HC RX IP 250 OP 636: Mod: JZ

## 2023-06-28 PROCEDURE — 250N000013 HC RX MED GY IP 250 OP 250 PS 637: Performed by: OBSTETRICS & GYNECOLOGY

## 2023-06-28 PROCEDURE — 96374 THER/PROPH/DIAG INJ IV PUSH: CPT

## 2023-06-28 PROCEDURE — 87086 URINE CULTURE/COLONY COUNT: CPT | Performed by: OBSTETRICS & GYNECOLOGY

## 2023-06-28 PROCEDURE — 258N000003 HC RX IP 258 OP 636: Performed by: OBSTETRICS & GYNECOLOGY

## 2023-06-28 PROCEDURE — 85384 FIBRINOGEN ACTIVITY: CPT | Performed by: OBSTETRICS & GYNECOLOGY

## 2023-06-28 PROCEDURE — 85027 COMPLETE CBC AUTOMATED: CPT | Performed by: OBSTETRICS & GYNECOLOGY

## 2023-06-28 PROCEDURE — 86850 RBC ANTIBODY SCREEN: CPT | Performed by: OBSTETRICS & GYNECOLOGY

## 2023-06-28 PROCEDURE — 250N000013 HC RX MED GY IP 250 OP 250 PS 637: Performed by: STUDENT IN AN ORGANIZED HEALTH CARE EDUCATION/TRAINING PROGRAM

## 2023-06-28 RX ORDER — PROCHLORPERAZINE MALEATE 5 MG
10 TABLET ORAL EVERY 6 HOURS PRN
Status: DISCONTINUED | OUTPATIENT
Start: 2023-06-28 | End: 2023-06-28 | Stop reason: HOSPADM

## 2023-06-28 RX ORDER — ONDANSETRON 2 MG/ML
4 INJECTION INTRAMUSCULAR; INTRAVENOUS EVERY 6 HOURS PRN
Status: DISCONTINUED | OUTPATIENT
Start: 2023-06-28 | End: 2023-06-28 | Stop reason: HOSPADM

## 2023-06-28 RX ORDER — METOCLOPRAMIDE 10 MG/1
10 TABLET ORAL EVERY 6 HOURS PRN
Status: DISCONTINUED | OUTPATIENT
Start: 2023-06-28 | End: 2023-06-28 | Stop reason: HOSPADM

## 2023-06-28 RX ORDER — ONDANSETRON 4 MG/1
4 TABLET, ORALLY DISINTEGRATING ORAL EVERY 6 HOURS PRN
Status: DISCONTINUED | OUTPATIENT
Start: 2023-06-28 | End: 2023-06-28 | Stop reason: HOSPADM

## 2023-06-28 RX ORDER — NALOXONE HYDROCHLORIDE 0.4 MG/ML
0.4 INJECTION, SOLUTION INTRAMUSCULAR; INTRAVENOUS; SUBCUTANEOUS
Status: DISCONTINUED | OUTPATIENT
Start: 2023-06-28 | End: 2023-06-28 | Stop reason: HOSPADM

## 2023-06-28 RX ORDER — BISACODYL 10 MG
10 SUPPOSITORY, RECTAL RECTAL DAILY PRN
Status: DISCONTINUED | OUTPATIENT
Start: 2023-06-30 | End: 2023-06-28 | Stop reason: HOSPADM

## 2023-06-28 RX ORDER — PROCHLORPERAZINE 25 MG
25 SUPPOSITORY, RECTAL RECTAL EVERY 12 HOURS PRN
Status: DISCONTINUED | OUTPATIENT
Start: 2023-06-28 | End: 2023-06-28 | Stop reason: HOSPADM

## 2023-06-28 RX ORDER — DOCUSATE SODIUM 100 MG/1
100 CAPSULE, LIQUID FILLED ORAL 2 TIMES DAILY
Status: DISCONTINUED | OUTPATIENT
Start: 2023-06-28 | End: 2023-06-28 | Stop reason: HOSPADM

## 2023-06-28 RX ORDER — METOCLOPRAMIDE HYDROCHLORIDE 5 MG/ML
10 INJECTION INTRAMUSCULAR; INTRAVENOUS EVERY 6 HOURS PRN
Status: DISCONTINUED | OUTPATIENT
Start: 2023-06-28 | End: 2023-06-28 | Stop reason: HOSPADM

## 2023-06-28 RX ORDER — MORPHINE SULFATE 2 MG/ML
4 INJECTION, SOLUTION INTRAMUSCULAR; INTRAVENOUS EVERY 4 HOURS PRN
Status: DISCONTINUED | OUTPATIENT
Start: 2023-06-28 | End: 2023-06-28

## 2023-06-28 RX ORDER — ONDANSETRON 2 MG/ML
8 INJECTION INTRAMUSCULAR; INTRAVENOUS EVERY 6 HOURS PRN
Status: DISCONTINUED | OUTPATIENT
Start: 2023-06-28 | End: 2023-06-28 | Stop reason: HOSPADM

## 2023-06-28 RX ORDER — NALOXONE HYDROCHLORIDE 0.4 MG/ML
0.2 INJECTION, SOLUTION INTRAMUSCULAR; INTRAVENOUS; SUBCUTANEOUS
Status: DISCONTINUED | OUTPATIENT
Start: 2023-06-28 | End: 2023-06-28 | Stop reason: HOSPADM

## 2023-06-28 RX ORDER — ACETAMINOPHEN 325 MG/1
TABLET ORAL
Status: COMPLETED
Start: 2023-06-28 | End: 2023-06-28

## 2023-06-28 RX ORDER — HYDROXYZINE HYDROCHLORIDE 25 MG/1
100 TABLET, FILM COATED ORAL
Status: DISCONTINUED | OUTPATIENT
Start: 2023-06-28 | End: 2023-06-28 | Stop reason: HOSPADM

## 2023-06-28 RX ORDER — ONDANSETRON 2 MG/ML
INJECTION INTRAMUSCULAR; INTRAVENOUS
Status: COMPLETED
Start: 2023-06-28 | End: 2023-06-28

## 2023-06-28 RX ORDER — ACETAMINOPHEN 325 MG/1
975 TABLET ORAL EVERY 8 HOURS PRN
Status: DISCONTINUED | OUTPATIENT
Start: 2023-06-28 | End: 2023-06-28

## 2023-06-28 RX ORDER — ACETAMINOPHEN 325 MG/1
650 TABLET ORAL EVERY 4 HOURS PRN
Status: DISCONTINUED | OUTPATIENT
Start: 2023-06-28 | End: 2023-06-28

## 2023-06-28 RX ORDER — ACETAMINOPHEN 325 MG/1
975 TABLET ORAL EVERY 6 HOURS PRN
Status: DISCONTINUED | OUTPATIENT
Start: 2023-06-28 | End: 2023-06-28 | Stop reason: HOSPADM

## 2023-06-28 RX ORDER — OXYCODONE HYDROCHLORIDE 5 MG/1
5 TABLET ORAL EVERY 6 HOURS PRN
Status: DISCONTINUED | OUTPATIENT
Start: 2023-06-28 | End: 2023-06-28 | Stop reason: HOSPADM

## 2023-06-28 RX ORDER — SODIUM CHLORIDE, SODIUM LACTATE, POTASSIUM CHLORIDE, CALCIUM CHLORIDE 600; 310; 30; 20 MG/100ML; MG/100ML; MG/100ML; MG/100ML
INJECTION, SOLUTION INTRAVENOUS CONTINUOUS
Status: DISCONTINUED | OUTPATIENT
Start: 2023-06-28 | End: 2023-06-28

## 2023-06-28 RX ORDER — LIDOCAINE 40 MG/G
CREAM TOPICAL
Status: DISCONTINUED | OUTPATIENT
Start: 2023-06-28 | End: 2023-06-28 | Stop reason: HOSPADM

## 2023-06-28 RX ORDER — CYCLOBENZAPRINE HCL 5 MG
5-10 TABLET ORAL 3 TIMES DAILY PRN
Qty: 10 TABLET | Refills: 0 | Status: SHIPPED | OUTPATIENT
Start: 2023-06-28 | End: 2023-07-03

## 2023-06-28 RX ORDER — SIMETHICONE 80 MG
160 TABLET,CHEWABLE ORAL EVERY 4 HOURS PRN
Status: DISCONTINUED | OUTPATIENT
Start: 2023-06-28 | End: 2023-06-28 | Stop reason: HOSPADM

## 2023-06-28 RX ADMIN — MORPHINE SULFATE 4 MG: 2 INJECTION, SOLUTION INTRAMUSCULAR; INTRAVENOUS at 05:51

## 2023-06-28 RX ADMIN — ONDANSETRON 8 MG: 2 INJECTION INTRAMUSCULAR; INTRAVENOUS at 03:27

## 2023-06-28 RX ADMIN — ACETAMINOPHEN 975 MG: 325 TABLET, FILM COATED ORAL at 10:15

## 2023-06-28 RX ADMIN — SODIUM CHLORIDE, POTASSIUM CHLORIDE, SODIUM LACTATE AND CALCIUM CHLORIDE: 600; 310; 30; 20 INJECTION, SOLUTION INTRAVENOUS at 05:27

## 2023-06-28 RX ADMIN — METOCLOPRAMIDE 10 MG: 10 TABLET ORAL at 10:15

## 2023-06-28 RX ADMIN — SODIUM CHLORIDE, POTASSIUM CHLORIDE, SODIUM LACTATE AND CALCIUM CHLORIDE 1000 ML: 600; 310; 30; 20 INJECTION, SOLUTION INTRAVENOUS at 03:29

## 2023-06-28 RX ADMIN — OXYCODONE HYDROCHLORIDE 5 MG: 5 TABLET ORAL at 10:15

## 2023-06-28 RX ADMIN — ACETAMINOPHEN 975 MG: 325 TABLET ORAL at 03:29

## 2023-06-28 RX ADMIN — MORPHINE SULFATE 2 MG: 2 INJECTION, SOLUTION INTRAMUSCULAR; INTRAVENOUS at 09:48

## 2023-06-28 ASSESSMENT — ACTIVITIES OF DAILY LIVING (ADL)
ADLS_ACUITY_SCORE: 18

## 2023-06-28 NOTE — PLAN OF CARE
Patient given Rx for Flexeril and instructed on usage; med info from pharmacy included with medication.    Discharge instructions reviewed and given to patient.  Patient verbalizes understanding and denies any further questions or concerns at this time.  Patient discharged with sister who will be driving her to her home.

## 2023-06-28 NOTE — PLAN OF CARE
Pt Kendra arrived to room 230 at 0515. Oriented to room and call light system. External toco and US placed with patient consent. Pt voided upon entrance to the room.    IV fluids started per orders.

## 2023-06-28 NOTE — H&P (VIEW-ONLY)
OB Antepartum History and Physical    CC: Severe back pain and emesis    HPI: Kendra is a 28 yo  at 37+0wga with back pain concerning for nephrolithiasis. She noted increased nausea all through the day yesterday and her last meal was at 12 pm. She had abdominal pain initially from 5 pm to 9 pm then it went to her back and became severe. She was unable to get comfortable and started to have emesis. She had some radiation of pain to her thighs as well. No actual contractions. No vaginal bleeding and no loss of fluid. On presentation to the MAC she had large amounts of emesis repeatedly with pain. She was given IV fluid and zofran and when she continued to be uncomfortable admission was recommended. Her initial labs were also negative for any signs of placental abruption. Her fetus remained reactive throughout. She had no contractions. Her initial blood pressure was in the mild range followed by normal blood pressures. Her OB history is significant for a primary  section for arrest of descent. She initially desired a trial of labor but now is desiring a repeat  section which is scheduled for  with Dr. Epstein. Kendra has a significant family history of kidney stones. Her father had had 8 and her sister has had several as well. There was initial concern for a concealed placental abruption but she has normal coags, no contractions and a reassuring fetal assessment.     Past Surgical History:   Procedure Laterality Date      SECTION Bilateral 2021    Procedure:  SECTION;  Surgeon: Patricia Hoskins MD;  Location:  L+D     TONSILLECTOMY & ADENOIDECTOMY Bilateral 1998     OB History    Para Term  AB Living   2 1 1 0 0 1   SAB IAB Ectopic Multiple Live Births   0 0 0 0 1      # Outcome Date GA Lbr Jakub/2nd Weight Sex Delivery Anes PTL Lv   2 Current            1 Term 21 40w1d 08:23 / 04:59 3.71 kg (8 lb 2.9 oz) F CS-LTranv EPI N ELVA       Complications: Cephalopelvic Disproportion, Failure to Progress in Second Stage      Name: NARVAEZ,FEMALE-SCOT      Apgar1: 9  Apgar5: 9     Current Facility-Administered Medications   Medication     acetaminophen (TYLENOL) tablet 650 mg     acetaminophen (TYLENOL) tablet 975 mg     [START ON 6/30/2023] bisacodyl (DULCOLAX) suppository 10 mg     docusate sodium (COLACE) capsule 100 mg     hydrOXYzine (ATARAX) tablet 100 mg     lactated ringers infusion     metoclopramide (REGLAN) injection 10 mg    Or     metoclopramide (REGLAN) tablet 10 mg     morphine (PF) injection 4 mg     naloxone (NARCAN) injection 0.2 mg    Or     naloxone (NARCAN) injection 0.4 mg    Or     naloxone (NARCAN) injection 0.2 mg    Or     naloxone (NARCAN) injection 0.4 mg     No Tdap Needed - Assessment: Patient does not need Tdap vaccine     ondansetron (ZOFRAN ODT) ODT tab 4 mg    Or     ondansetron (ZOFRAN) injection 4 mg     ondansetron (ZOFRAN) injection 8 mg     prochlorperazine (COMPAZINE) injection 10 mg    Or     prochlorperazine (COMPAZINE) tablet 10 mg    Or     prochlorperazine (COMPAZINE) suppository 25 mg     simethicone (MYLICON) chewable tablet 160 mg     [START ON 6/30/2023] sodium phosphate (FLEET ENEMA) 1 enema     Vitals:    06/28/23 0220 06/28/23 0223 06/28/23 0235 06/28/23 0530   BP: 132/63 132/63 118/69 103/54   BP Location:    Right arm   Patient Position:    Left side   Cuff Size:    Adult Large   Pulse:    103   Resp:    16   Temp:    97.7  F (36.5  C)   TempSrc:    Temporal   Weight:       Height:         GEN: NAD  CV: RRR  RESP: CTAB  GI: soft, gravid. No abdominal tenderness  FHT: 150 bpm/ + accels/ no decels/ mod minal  [place]: none  SVE: deferred  Back: no CVA tenderness bilaterally. Tender to palpation of lower back bilaterally.   Latest Reference Range & Units 06/28/23 02:46   INR 0.85 - 1.15  0.99   PTT 22 - 38 Seconds 26   Fibrinogen 170 - 490 mg/dL 435      Latest Reference Range & Units 06/28/23 02:46   WBC  4.0 - 11.0 10e3/uL 10.0   Hemoglobin 11.7 - 15.7 g/dL 12.1   Hematocrit 35.0 - 47.0 % 37.2   Platelet Count 150 - 450 10e3/uL 242     Assessment: 26 yo  at 37+0wga with sudden onset back pain concerning for nephrolithiasis with no suspicion of placental abruption.    Plan:  Admit for pain control and IV fluids. Kendra was given the recommendation of morphine due to ureteral dilation with the medication and that acetaminophen has not helped.  Monitor Blood pressures. Initial BP in mild range with subsequent blood pressures within normal limits  Fetus: reassuring. Continue continuous fetal monitoring for now  Regular Diet  IV Fluids  SCDs  Renal ultrasound.    Patricia Hoskins MD

## 2023-06-28 NOTE — PLAN OF CARE
Pain better controlled with IV morphine, pt now rates pain at 4/10. Able to sleep. Ambulating to bathroom, no sediment or calcium deposits noted in urine straining. Pt denies nausea at this time. LR running at 125 mL/hr. PCDs in place. Plan for renal ultrasound this AM.

## 2023-06-28 NOTE — PLAN OF CARE
Bedside report received.  Patient reports pain is decreased to a 4/10, down from 10/10 upon arrival.  Patient states she is thirsty and requesting water.  Regular diet is ordered.  Water and menu given; patient instructed on how to order.  States she is not hungry right now and would like to continue to rest.  Patient denies further needs at this time.  Awaiting call for ultrasound availability.

## 2023-06-28 NOTE — DISCHARGE INSTRUCTIONS
Discharge Instruction for Undelivered Patients      You were seen for:  Back Pain  We Consulted: Dr. Epstein  You had (Test or Medicine):Fetal and Uterine monitoring, pain medications, IV fluids, Ultrasound     Diet:   Drink 8 to 12 glasses of liquids (milk, juice, water) every day.  You may eat meals and snacks.     Activity:  Count fetal kicks everyday (see handout)  Call your doctor or nurse midwife if your baby is moving less than usual.     Call your provider if you notice:  Swelling in your face or increased swelling in your hands or legs.  Headaches that are not relieved by Tylenol (acetaminophen).  Changes in your vision (blurring: seeing spots or stars.)  Nausea (sick to your stomach) and vomiting (throwing up).   Weight gain of 5 pounds or more per week.  Heartburn that doesn't go away.  Signs of bladder infection: pain when you urinate (use the toilet), need to go more often and more urgently.  The bag of sofia (rupture of membranes) breaks, or you notice leaking in your underwear.  Bright red blood in your underwear.  Second (plus) baby: Contractions less than 5 minutes apart and getting stronger.    Follow-up:  As scheduled in the clinic    Call clinic if back pain returns or gets worse.      Kick Counts  It s normal to worry about your baby s health. One way you can know your baby s doing well is to record the baby s movements once a day. This is called a kick count.   Remember to take your kick count records to all your appointments with your healthcare provider.  How to count kicks  Time how long it takes you to feel 10 kicks, flutters, swishes, or rolls. Ideally, you want to feel at least 10 movements in 2 hours. You will likely feel 10 movements in less time than that.  Starting at 28 weeks, count your baby's movements daily. Follow your healthcare provider's instructions for kick counting. Here are tips for counting kicks:  Choose a time when the baby is active, such as after a meal.   Sit  comfortably or lie on your side.   The first time the baby moves, write down the time.   Count each movement until the baby has moved  10 times. This can take from 20 minutes to 2 hours.   If you haven't felt 10 kicks by the end of the second hour, wait a few hours. Then try again.  Try to do it at the same time each day.  When to call your healthcare provider  Call your healthcare provider  right away if:  You do a couple sets of kick counts during the day and your baby moves fewer than 10 times in 2 hours.  Your baby moves much less often than on the days before.  You haven't felt your baby move all day.  [employer] last reviewed this educational content on 8/1/2020 2000-2022 [employer]. All rights reserved. This information is not intended as a substitute for professional medical care. Always follow your healthcare professional's instructions.

## 2023-06-28 NOTE — PROGRESS NOTES
Brief progress note    Patient is feeling better. States pain is now a 1. Mostly pain is in low left back, radiating to left upper thigh.       Vitals:    23 0800 23 0830 23 0906 23 0952   BP:   105/51    BP Location:       Patient Position:       Cuff Size:       Pulse:       Resp: 18 18 18 18   Temp:   98.1  F (36.7  C)    TempSrc:   Temporal    Weight:       Height:             Gen: well appearing in NAD      A/P: Kendra Haynes is a 27 year old  admitted to observation for acute onset low back pain. Initially evaluated for nephrolithiasis. UA and US normal. Presentation is most consistent with exacerbation of sciatica. Received narcotics with improvement in pain. Will discharge home with Flexeril prn. Has follow-up next week. Return precautions reviewed.     Sharri Epstein MD, MHS  23

## 2023-06-28 NOTE — PROVIDER NOTIFICATION
06/28/23 0223   Provider Notification   Provider Name/Title Dr Hoskins   Method of Notification Phone;Electronic Page   Request Evaluate - Remote   Notification Reason Patient Arrived   MD called for orders and to update on pt status. MD would like to collect a CBC, INR, fibroginen and a UA for a possible UTI. Orders telephone with read back.

## 2023-06-28 NOTE — TELEPHONE ENCOUNTER
OB Triage Call      Is patient's OB/Midwife with the [facility] or [facility]? [facility]- Proceed with triage     Reason for call: Back pain    Assessment: Patient calling reports having false contractions from 5 pm to 8p m with severe back pain rated 10/10 starting at 9 pm. Reports having 5 episodes of projectile vomiting as well with the pain. Reports pain is now 4/10 with pain radiating into both thighs. Denies contractions, severe abdominal pain and inability to urinate. Paged Dr. Hoskins for PCP triage with advice for patient to be seen in  L&D. L&D notified and is expecting the patient. Patient notified and will go to L&D now.     Plan: Go to L&D at [facility] now    Patient plans to deliver at [facility]    Patient's primary OB Provider is Dr. Epstein.      Per protocol recommendations Consult needed for [facility] MD or Midwife.  Patient's primary OB is [facility].  On-Call provider Dr. Hoskins paged at 1:09 AM. Call returned at 1:11 AM and advised on triage assessment.  Provider recommendations: To be evaluated in L&D. [facility] ([phone]) notified of patient's pending arrival.  Report given to Maite.      Is patient's delivering hospital on divert? No      37w0d    Estimated Date of Delivery: 2023        OB History    Para Term  AB Living   2 1 1 0 0 1   SAB IAB Ectopic Multiple Live Births   0 0 0 0 1      # Outcome Date GA Lbr Jakub/2nd Weight Sex Delivery Anes PTL Lv   2 Current            1 Term 21 40w1d 08:23 / 04:59 3.71 kg (8 lb 2.9 oz) F CS-LTranv EPI N ELVA      Complications: Cephalopelvic Disproportion, Failure to Progress in Second Stage      Name: SOLANGEFEMALE-SCOT      Apgar1: 9  Apgar5: 9       No results found for: GBS       Dede Hernandez RN 23 1:01 AM  [facility] Nurse Advisor    Reason for Disposition    [1] Pain radiates into the thigh or further down the leg AND [2] both legs    Additional Information    Negative: Shock suspected  (e.g., cold/pale/clammy skin, too weak to stand, low BP, rapid pulse)    Negative: SEVERE abdominal pain    Negative: Sounds like a life-threatening emergency to the triager    Negative: Major injury to the back (e.g., MVA, fall > 10 feet or 3 meters, penetrating injury, etc.)    Negative: Followed a tailbone injury    Negative: [1] Having contractions or other symptoms of labor AND [2] 37 or more weeks pregnant (i.e., term pregnancy)    Negative: [1] Having contractions or other symptoms of labor AND [2] < 37 weeks pregnant (i.e., )    Negative: [1] Abdominal pain AND [2] pregnant 20 or more weeks    Negative: [1] Abdominal pain AND [2] pregnant < 20 weeks    Negative: [1] Pain in the upper back AND [2] overlies the rib cage    Negative: [1] Pain in the upper back AND [2] worsened by coughing (or clearly increases with breathing)    Negative: [1] Unable to urinate (or only a few drops) > 4 hours AND     [2] bladder feels very full (e.g., palpable bladder or strong urge to urinate)    Negative: Numbness in groin or rectal area (i.e., loss of sensation)    Negative: Leakage of fluid from vagina    Negative: [1] Pregnant 23 or more weeks AND [2] baby is moving less today (e.g., kick count < 5 in 1 hour or < 10 in 2 hours)    Negative: Weakness of a leg or foot (e.g., unable to bear weight, dragging foot)    Negative: Unable to walk    Negative: Patient sounds very sick or weak to the triager    Negative: [1] SEVERE back pain (e.g., excruciating, unable to do any normal activities) AND [2] not improved 2 hours after pain medicine    Protocols used: PREGNANCY - BACK PAIN-A-

## 2023-06-28 NOTE — H&P
OB Antepartum History and Physical    CC: Severe back pain and emesis    HPI: Kendra is a 26 yo  at 37+0wga with back pain concerning for nephrolithiasis. She noted increased nausea all through the day yesterday and her last meal was at 12 pm. She had abdominal pain initially from 5 pm to 9 pm then it went to her back and became severe. She was unable to get comfortable and started to have emesis. She had some radiation of pain to her thighs as well. No actual contractions. No vaginal bleeding and no loss of fluid. On presentation to the MAC she had large amounts of emesis repeatedly with pain. She was given IV fluid and zofran and when she continued to be uncomfortable admission was recommended. Her initial labs were also negative for any signs of placental abruption. Her fetus remained reactive throughout. She had no contractions. Her initial blood pressure was in the mild range followed by normal blood pressures. Her OB history is significant for a primary  section for arrest of descent. She initially desired a trial of labor but now is desiring a repeat  section which is scheduled for  with Dr. Epstein. Kendra has a significant family history of kidney stones. Her father had had 8 and her sister has had several as well. There was initial concern for a concealed placental abruption but she has normal coags, no contractions and a reassuring fetal assessment.     Past Surgical History:   Procedure Laterality Date      SECTION Bilateral 2021    Procedure:  SECTION;  Surgeon: Patricia Hoskins MD;  Location:  L+D     TONSILLECTOMY & ADENOIDECTOMY Bilateral 1998     OB History    Para Term  AB Living   2 1 1 0 0 1   SAB IAB Ectopic Multiple Live Births   0 0 0 0 1      # Outcome Date GA Lbr Jakub/2nd Weight Sex Delivery Anes PTL Lv   2 Current            1 Term 21 40w1d 08:23 / 04:59 3.71 kg (8 lb 2.9 oz) F CS-LTranv EPI N ELVA       Complications: Cephalopelvic Disproportion, Failure to Progress in Second Stage      Name: NARVAEZ,FEMALE-SCOT      Apgar1: 9  Apgar5: 9     Current Facility-Administered Medications   Medication     acetaminophen (TYLENOL) tablet 650 mg     acetaminophen (TYLENOL) tablet 975 mg     [START ON 6/30/2023] bisacodyl (DULCOLAX) suppository 10 mg     docusate sodium (COLACE) capsule 100 mg     hydrOXYzine (ATARAX) tablet 100 mg     lactated ringers infusion     metoclopramide (REGLAN) injection 10 mg    Or     metoclopramide (REGLAN) tablet 10 mg     morphine (PF) injection 4 mg     naloxone (NARCAN) injection 0.2 mg    Or     naloxone (NARCAN) injection 0.4 mg    Or     naloxone (NARCAN) injection 0.2 mg    Or     naloxone (NARCAN) injection 0.4 mg     No Tdap Needed - Assessment: Patient does not need Tdap vaccine     ondansetron (ZOFRAN ODT) ODT tab 4 mg    Or     ondansetron (ZOFRAN) injection 4 mg     ondansetron (ZOFRAN) injection 8 mg     prochlorperazine (COMPAZINE) injection 10 mg    Or     prochlorperazine (COMPAZINE) tablet 10 mg    Or     prochlorperazine (COMPAZINE) suppository 25 mg     simethicone (MYLICON) chewable tablet 160 mg     [START ON 6/30/2023] sodium phosphate (FLEET ENEMA) 1 enema     Vitals:    06/28/23 0220 06/28/23 0223 06/28/23 0235 06/28/23 0530   BP: 132/63 132/63 118/69 103/54   BP Location:    Right arm   Patient Position:    Left side   Cuff Size:    Adult Large   Pulse:    103   Resp:    16   Temp:    97.7  F (36.5  C)   TempSrc:    Temporal   Weight:       Height:         GEN: NAD  CV: RRR  RESP: CTAB  GI: soft, gravid. No abdominal tenderness  FHT: 150 bpm/ + accels/ no decels/ mod minal  [place]: none  SVE: deferred  Back: no CVA tenderness bilaterally. Tender to palpation of lower back bilaterally.   Latest Reference Range & Units 06/28/23 02:46   INR 0.85 - 1.15  0.99   PTT 22 - 38 Seconds 26   Fibrinogen 170 - 490 mg/dL 435      Latest Reference Range & Units 06/28/23 02:46   WBC  4.0 - 11.0 10e3/uL 10.0   Hemoglobin 11.7 - 15.7 g/dL 12.1   Hematocrit 35.0 - 47.0 % 37.2   Platelet Count 150 - 450 10e3/uL 242     Assessment: 26 yo  at 37+0wga with sudden onset back pain concerning for nephrolithiasis with no suspicion of placental abruption.    Plan:  Admit for pain control and IV fluids. Kendra was given the recommendation of morphine due to ureteral dilation with the medication and that acetaminophen has not helped.  Monitor Blood pressures. Initial BP in mild range with subsequent blood pressures within normal limits  Fetus: reassuring. Continue continuous fetal monitoring for now  Regular Diet  IV Fluids  SCDs  Renal ultrasound.    Patricia Hoskins MD

## 2023-06-28 NOTE — PROVIDER NOTIFICATION
06/28/23 0448   Provider Notification   Provider Name/Title Dr Hoskins   Method of Notification Phone;Electronic Page   Request Evaluate - Remote   Notification Reason Status Update     MD updated on UA results and pts pain. MD would like to admit pt for observation. MD to place orders. MD planning to come assess pt on unit. Will await orders.

## 2023-06-28 NOTE — PLAN OF CARE
Data: [facility] presented to [facility]: 2023  2:06 AM.  Reason for maternal/fetal assessment is Intense back pain and vomiting. Patient reports pain starting this evening to which she was unable to fall asleep. Patient denies leaking of vaginal fluid/rupture of membranes, vaginal bleeding, abdominal pain, pelvic pressure, visual disturbances, epigastric or RUQ pain. Patient reports fetal movement is normal. Patient is a 37w0d .  Prenatal record reviewed. Pregnancy has been uncomplicated. Pt is a rpt C/S and is scheduled for .     Vital signs wnl. Support person is present.     Action: Verbal consent for EFM. Triage assessment completed.     Response: Patient verbalized agreement with plan. Will contact Dr Hoskins with update and further orders.

## 2023-06-28 NOTE — PLAN OF CARE
0948 - Patient rating pain 6-7/10.  Morphine offered and requested by patient.  First half of morphine dose given.  Patient reports pain with injection.  IV site appears swollen with possible infiltration.  IV fluids stopped and second half of morphine held.  TC from Dr. Epstein; updated on IV infiltration, MD notes US report, patient's pain level increasing over the past hour.  See orders entered by MD.  Patient updated on POC including no further continuous monitoring, tylenol frequency change and PO oxycodone.  Patient verbally agrees.     0952 - EUS and EUM removed.    1015 - Oxycodone and Tylenol given.  Discussed Reglan with patient for nausea; patient agrees that antinausea med would be a good idea.  Reglan given.  Patient denies further needs at this time.  Patient settled for rest.

## 2023-06-29 LAB — BACTERIA UR CULT: NORMAL

## 2023-06-29 NOTE — DISCHARGE SUMMARY
[facility] Discharge Summary    Kendra Haynes MRN# [id number]   Age: 27 year old YOB: 1995     Date of Admission:  2023  Date of Discharge:  2023  Admitting Physician:  Patricia Hoskins MD  Discharge Physician:  Sharri Epstein MD, S     Admission Diagnosis:  Previous  delivery, antepartum [O34.219]  Encounter for triage in pregnant patient [Z36.89]  Back pain affecting pregnancy [O99.891, M54.9]      Discharge Diagnosis:  Same    Procedures:  None    Consultations:  None    Medications prior to admission:  No medications prior to admission.         Brief History of Presentation:  See H&P      Hospital Course:  Admitted for pain management and evaluation for nephrolithiasis and placental abroption. Evaluation negative. Back pain MSK in nature. Pain improved. Patient discharged in stable condition.         Discharge Instructions:  Call or present to labor and delivery if you experience:   -Regular painful contractions concerning for labor   -Leakage of fluid concerning for ruptured membranes   -Decreased fetal movement   -Bright red vaginal bleeding    -Headache, vision changes, upper abdominal pain, significant increase in swelling,   generalized unwell feeling    Follow up:  One week      Discharge Medications:  Discharge Medication List as of 2023  1:07 PM      START taking these medications    Details   cyclobenzaprine (FLEXERIL) 5 MG tablet Take 1-2 tablets (5-10 mg) by mouth 3 times daily as needed for muscle spasms, Disp-10 tablet, R-0, E-Prescribe         CONTINUE these medications which have NOT CHANGED    Details   aspirin (ASA) 81 MG chewable tablet Take 81 mg by mouth daily, Historical      Prenatal Vit-Fe Fumarate-FA (PRENATAL VITAMIN PO) Take 1 tablet by mouth, Historical             Sharri Epstein MD, S  23

## 2023-07-03 ENCOUNTER — ANCILLARY PROCEDURE (OUTPATIENT)
Dept: ULTRASOUND IMAGING | Facility: CLINIC | Age: 28
End: 2023-07-03
Attending: STUDENT IN AN ORGANIZED HEALTH CARE EDUCATION/TRAINING PROGRAM
Payer: COMMERCIAL

## 2023-07-03 ENCOUNTER — PRENATAL OFFICE VISIT (OUTPATIENT)
Dept: OBGYN | Facility: CLINIC | Age: 28
End: 2023-07-03
Payer: COMMERCIAL

## 2023-07-03 VITALS — WEIGHT: 255 LBS | DIASTOLIC BLOOD PRESSURE: 68 MMHG | BODY MASS INDEX: 43.77 KG/M2 | SYSTOLIC BLOOD PRESSURE: 114 MMHG

## 2023-07-03 DIAGNOSIS — O09.93 SUPERVISION OF HIGH RISK PREGNANCY IN THIRD TRIMESTER: Primary | ICD-10-CM

## 2023-07-03 DIAGNOSIS — M54.9 BACK PAIN AFFECTING PREGNANCY IN THIRD TRIMESTER: ICD-10-CM

## 2023-07-03 DIAGNOSIS — O99.891 BACK PAIN AFFECTING PREGNANCY IN THIRD TRIMESTER: ICD-10-CM

## 2023-07-03 DIAGNOSIS — O12.03 EDEMA DURING PREGNANCY IN THIRD TRIMESTER: ICD-10-CM

## 2023-07-03 PROCEDURE — 76819 FETAL BIOPHYS PROFIL W/O NST: CPT

## 2023-07-03 PROCEDURE — 99213 OFFICE O/P EST LOW 20 MIN: CPT | Performed by: OBSTETRICS & GYNECOLOGY

## 2023-07-03 RX ORDER — CYCLOBENZAPRINE HCL 5 MG
5-10 TABLET ORAL 3 TIMES DAILY PRN
Qty: 10 TABLET | Refills: 0 | Status: ON HOLD | OUTPATIENT
Start: 2023-07-03 | End: 2023-07-15

## 2023-07-03 NOTE — PROGRESS NOTES
OB Visit @ 37w5d     No loss of fluid/vaginal bleeding/regular contractions. + FM  Still having a lot of issues with her hip pain.         ICD-10-CM    1. Supervision of high risk pregnancy in third trimester  O09.93       2. Edema during pregnancy in third trimester  O12.03 Compression Sleeve/Stocking Order for DME - ONLY FOR DME      3. Back pain affecting pregnancy in third trimester  O99.891 cyclobenzaprine (FLEXERIL) 5 MG tablet    M54.9           -ultrasound prelim report done. Appears 8/8, nl KETAN. Will be contacted when report completed  -Back pain persisting. Handout on stretching. Tylenol/flexeril, refill given. Compression stockings as is more immobile. DIscussed not being on bedrest due to VTE risk. Anticipate resolution with delivery  -Hx c/s, planned repeat. ERAS bag given.   -Labor precautions. F/U 1wk as needed.     Lian Khan Masters, DO

## 2023-07-05 ENCOUNTER — MEDICAL CORRESPONDENCE (OUTPATIENT)
Dept: HEALTH INFORMATION MANAGEMENT | Facility: CLINIC | Age: 28
End: 2023-07-05
Payer: COMMERCIAL

## 2023-07-10 ENCOUNTER — MEDICAL CORRESPONDENCE (OUTPATIENT)
Dept: HEALTH INFORMATION MANAGEMENT | Facility: CLINIC | Age: 28
End: 2023-07-10

## 2023-07-10 ENCOUNTER — ANCILLARY PROCEDURE (OUTPATIENT)
Dept: ULTRASOUND IMAGING | Facility: CLINIC | Age: 28
End: 2023-07-10
Attending: STUDENT IN AN ORGANIZED HEALTH CARE EDUCATION/TRAINING PROGRAM
Payer: COMMERCIAL

## 2023-07-10 ENCOUNTER — MYC MEDICAL ADVICE (OUTPATIENT)
Dept: OBGYN | Facility: CLINIC | Age: 28
End: 2023-07-10

## 2023-07-10 ENCOUNTER — PRENATAL OFFICE VISIT (OUTPATIENT)
Dept: OBGYN | Facility: CLINIC | Age: 28
End: 2023-07-10
Attending: STUDENT IN AN ORGANIZED HEALTH CARE EDUCATION/TRAINING PROGRAM
Payer: COMMERCIAL

## 2023-07-10 VITALS — WEIGHT: 257 LBS | SYSTOLIC BLOOD PRESSURE: 114 MMHG | BODY MASS INDEX: 44.11 KG/M2 | DIASTOLIC BLOOD PRESSURE: 68 MMHG

## 2023-07-10 DIAGNOSIS — O09.93 SUPERVISION OF HIGH RISK PREGNANCY IN THIRD TRIMESTER: Primary | ICD-10-CM

## 2023-07-10 DIAGNOSIS — O99.891 BACK PAIN AFFECTING PREGNANCY IN THIRD TRIMESTER: ICD-10-CM

## 2023-07-10 DIAGNOSIS — M54.9 BACK PAIN AFFECTING PREGNANCY IN THIRD TRIMESTER: ICD-10-CM

## 2023-07-10 PROCEDURE — 99212 OFFICE O/P EST SF 10 MIN: CPT | Performed by: STUDENT IN AN ORGANIZED HEALTH CARE EDUCATION/TRAINING PROGRAM

## 2023-07-10 PROCEDURE — 76819 FETAL BIOPHYS PROFIL W/O NST: CPT | Performed by: STUDENT IN AN ORGANIZED HEALTH CARE EDUCATION/TRAINING PROGRAM

## 2023-07-10 NOTE — PROGRESS NOTES
Prenatal Care    Kendra Haynes is a 27 year old  at 38w5d by LMP c/w 11w4d US presenting for routine prenatal care. She is doing okay. Admitted for back pain a few weeks, ago, pain is now much improved. Denies LOF, VB, ctx. +FM.     Conditions affecting pregnancy:  - BMI 36  - Hx of CS x 1  - GBS positive    Objective- see flow sheet    Kendra Haynes is a 27 year old  at 38w5d presenting for routine ob visit.       ICD-10-CM    1. Supervision of high risk pregnancy in third trimester  O09.93       2. Back pain affecting pregnancy in third trimester  O99.891     M54.9         - First tri labs wnl. Delcines AFP. Level II US- suboptimal views. Follow-up wnl. Normal third tri labs. S/p TDAP. GBS positive.   - Back pain: continue to monitor.   - Discussed routine precautions.   - Hx of CS x 1- discussed TOLAC vs repeat CS. Indication for CS was OP position/CPD in last pregnancy. Repeat scheduled for  11am. Has ERAS bag.   - TWlbs. Pregravid BMI 36. Expect 11-20lbs. Reviewed healthy weight gain/exercise. On ASA. Growth US 27w6d: EFW 63%ile. Growth 34w: 82.5%ile. Weekly BPP at 37w- , normal MVP today.      Follow-up for delivery.     Sharri Epstein MD, S  07/10/23

## 2023-07-12 ENCOUNTER — ANESTHESIA EVENT (OUTPATIENT)
Dept: OBGYN | Facility: CLINIC | Age: 28
End: 2023-07-12
Payer: COMMERCIAL

## 2023-07-12 NOTE — ANESTHESIA PREPROCEDURE EVALUATION
Anesthesia Pre-Procedure Evaluation    Patient: Kendra Haynes   MRN: [id number] : 1995        Procedure : Procedure(s):  Repeat  Section          No past medical history on file.   Past Surgical History:   Procedure Laterality Date      SECTION Bilateral 2021    Procedure:  SECTION;  Surgeon: Patricia Hoskins MD;  Location:  L+D     TONSILLECTOMY & ADENOIDECTOMY Bilateral 1998      Allergies   Allergen Reactions     Clindamycin Hives      Social History     Tobacco Use     Smoking status: Never     Smokeless tobacco: Never   Substance Use Topics     Alcohol use: Not Currently     Comment: 0-1 drink every 3-4 months      Wt Readings from Last 1 Encounters:   07/10/23 116.6 kg (257 lb)        Anesthesia Evaluation            ROS/MED HX  ENT/Pulmonary:    (-) asthma   Neurologic: Comment: Persistent back pain during pregnancy - neg neurologic ROS     Cardiovascular:    (-) PIH   METS/Exercise Tolerance:     Hematologic:     (+) no anticoagulation therapy, no coagulopathy,     Musculoskeletal:       GI/Hepatic:     (+) GERD,     Renal/Genitourinary:       Endo:     (+) Obesity,     Psychiatric/Substance Use:       Infectious Disease:       Malignancy:       Other:            Physical Exam    Airway        Mallampati: II   TM distance: > 3 FB   Neck ROM: full   Mouth opening: > 3 cm    Respiratory Devices and Support         Dental  no notable dental history         Cardiovascular   cardiovascular exam normal          Pulmonary   pulmonary exam normal                OUTSIDE LABS:  CBC:   Lab Results   Component Value Date    WBC 10.0 2023    WBC 11.4 (H) 2023    HGB 12.1 2023    HGB 11.9 2023    HCT 37.2 2023    HCT 36.3 2023     2023     2023     BMP:   Lab Results   Component Value Date     (L) 2023     2021    POTASSIUM 3.1 (L) 2023    POTASSIUM 3.7 2021     CHLORIDE 104 06/28/2023    CHLORIDE 109 09/06/2021    CO2 18 (L) 06/28/2023    CO2 23 09/06/2021    BUN 4.2 (L) 06/28/2023    BUN 8 09/06/2021    CR 0.45 (L) 06/28/2023    CR 0.66 09/06/2021    GLC 88 06/28/2023    GLC 77 09/06/2021     COAGS:   Lab Results   Component Value Date    PTT 26 06/28/2023    INR 0.99 06/28/2023    FIBR 435 06/28/2023     POC: No results found for: BGM, HCG, HCGS  HEPATIC:   Lab Results   Component Value Date    ALBUMIN 2.9 (L) 06/28/2023    PROTTOTAL 5.5 (L) 06/28/2023    ALT 23 06/28/2023    AST 31 06/28/2023    ALKPHOS 127 (H) 06/28/2023    BILITOTAL 0.5 06/28/2023     OTHER:   Lab Results   Component Value Date    A1C 4.7 02/15/2021    FREDDY 8.1 (L) 06/28/2023    TSH 2.25 12/07/2019       Anesthesia Plan    ASA Status:  2      Anesthesia Type: Epidural.              Consents    Anesthesia Plan(s) and associated risks, benefits, and realistic alternatives discussed. Questions answered and patient/representative(s) expressed understanding.    - Discussed:     - Discussed with:  Patient         Postoperative Care            Comments:                Cosmo Caro DO, DO

## 2023-07-13 ENCOUNTER — ANESTHESIA (OUTPATIENT)
Dept: OBGYN | Facility: CLINIC | Age: 28
End: 2023-07-13
Payer: COMMERCIAL

## 2023-07-13 ENCOUNTER — HOSPITAL ENCOUNTER (INPATIENT)
Facility: CLINIC | Age: 28
LOS: 2 days | Discharge: HOME | End: 2023-07-15
Attending: STUDENT IN AN ORGANIZED HEALTH CARE EDUCATION/TRAINING PROGRAM | Admitting: STUDENT IN AN ORGANIZED HEALTH CARE EDUCATION/TRAINING PROGRAM
Payer: COMMERCIAL

## 2023-07-13 DIAGNOSIS — L25.3 CONTACT DERMATITIS DUE TO OTHER CHEMICAL PRODUCT, UNSPECIFIED CONTACT DERMATITIS TYPE: ICD-10-CM

## 2023-07-13 LAB
ABO/RH(D): NORMAL
ANTIBODY SCREEN: NEGATIVE
HGB BLD-MCNC: 11.3 G/DL (ref 11.7–15.7)
PLATELET # BLD AUTO: 283 10E3/UL (ref 150–450)
SPECIMEN EXPIRATION DATE: NORMAL
T PALLIDUM AB SER QL: NONREACTIVE

## 2023-07-13 PROCEDURE — 250N000009 HC RX 250: Performed by: NURSE ANESTHETIST, CERTIFIED REGISTERED

## 2023-07-13 PROCEDURE — 250N000011 HC RX IP 250 OP 636: Mod: JZ | Performed by: NURSE ANESTHETIST, CERTIFIED REGISTERED

## 2023-07-13 PROCEDURE — 86901 BLOOD TYPING SEROLOGIC RH(D): CPT | Performed by: STUDENT IN AN ORGANIZED HEALTH CARE EDUCATION/TRAINING PROGRAM

## 2023-07-13 PROCEDURE — 36415 COLL VENOUS BLD VENIPUNCTURE: CPT | Performed by: STUDENT IN AN ORGANIZED HEALTH CARE EDUCATION/TRAINING PROGRAM

## 2023-07-13 PROCEDURE — 258N000003 HC RX IP 258 OP 636: Performed by: STUDENT IN AN ORGANIZED HEALTH CARE EDUCATION/TRAINING PROGRAM

## 2023-07-13 PROCEDURE — 59515 CESAREAN DELIVERY: CPT | Performed by: STUDENT IN AN ORGANIZED HEALTH CARE EDUCATION/TRAINING PROGRAM

## 2023-07-13 PROCEDURE — 250N000011 HC RX IP 250 OP 636: Mod: JZ | Performed by: STUDENT IN AN ORGANIZED HEALTH CARE EDUCATION/TRAINING PROGRAM

## 2023-07-13 PROCEDURE — 258N000003 HC RX IP 258 OP 636: Performed by: NURSE ANESTHETIST, CERTIFIED REGISTERED

## 2023-07-13 PROCEDURE — 250N000011 HC RX IP 250 OP 636: Performed by: ANESTHESIOLOGY

## 2023-07-13 PROCEDURE — 370N000017 HC ANESTHESIA TECHNICAL FEE, PER MIN: Performed by: STUDENT IN AN ORGANIZED HEALTH CARE EDUCATION/TRAINING PROGRAM

## 2023-07-13 PROCEDURE — 86780 TREPONEMA PALLIDUM: CPT | Performed by: STUDENT IN AN ORGANIZED HEALTH CARE EDUCATION/TRAINING PROGRAM

## 2023-07-13 PROCEDURE — 85049 AUTOMATED PLATELET COUNT: CPT | Performed by: STUDENT IN AN ORGANIZED HEALTH CARE EDUCATION/TRAINING PROGRAM

## 2023-07-13 PROCEDURE — 250N000013 HC RX MED GY IP 250 OP 250 PS 637: Performed by: STUDENT IN AN ORGANIZED HEALTH CARE EDUCATION/TRAINING PROGRAM

## 2023-07-13 PROCEDURE — 59514 CESAREAN DELIVERY ONLY: CPT | Mod: 80 | Performed by: OBSTETRICS & GYNECOLOGY

## 2023-07-13 PROCEDURE — 250N000011 HC RX IP 250 OP 636: Performed by: STUDENT IN AN ORGANIZED HEALTH CARE EDUCATION/TRAINING PROGRAM

## 2023-07-13 PROCEDURE — 360N000076 HC SURGERY LEVEL 3, PER MIN: Performed by: STUDENT IN AN ORGANIZED HEALTH CARE EDUCATION/TRAINING PROGRAM

## 2023-07-13 PROCEDURE — 85018 HEMOGLOBIN: CPT | Performed by: STUDENT IN AN ORGANIZED HEALTH CARE EDUCATION/TRAINING PROGRAM

## 2023-07-13 PROCEDURE — 710N000010 HC RECOVERY PHASE 1, LEVEL 2, PER MIN: Performed by: STUDENT IN AN ORGANIZED HEALTH CARE EDUCATION/TRAINING PROGRAM

## 2023-07-13 PROCEDURE — 272N000001 HC OR GENERAL SUPPLY STERILE: Performed by: STUDENT IN AN ORGANIZED HEALTH CARE EDUCATION/TRAINING PROGRAM

## 2023-07-13 PROCEDURE — 120N000012 HC R&B POSTPARTUM

## 2023-07-13 PROCEDURE — 250N000013 HC RX MED GY IP 250 OP 250 PS 637

## 2023-07-13 RX ORDER — TRANEXAMIC ACID 10 MG/ML
1 INJECTION, SOLUTION INTRAVENOUS EVERY 30 MIN PRN
Status: DISCONTINUED | OUTPATIENT
Start: 2023-07-13 | End: 2023-07-13 | Stop reason: HOSPADM

## 2023-07-13 RX ORDER — ACETAMINOPHEN 325 MG/1
975 TABLET ORAL ONCE
Status: COMPLETED | OUTPATIENT
Start: 2023-07-13 | End: 2023-07-13

## 2023-07-13 RX ORDER — CEFAZOLIN SODIUM/WATER 2 G/20 ML
2 SYRINGE (ML) INTRAVENOUS
Status: DISCONTINUED | OUTPATIENT
Start: 2023-07-13 | End: 2023-07-13 | Stop reason: HOSPADM

## 2023-07-13 RX ORDER — SODIUM CHLORIDE, SODIUM LACTATE, POTASSIUM CHLORIDE, CALCIUM CHLORIDE 600; 310; 30; 20 MG/100ML; MG/100ML; MG/100ML; MG/100ML
INJECTION, SOLUTION INTRAVENOUS CONTINUOUS PRN
Status: DISCONTINUED | OUTPATIENT
Start: 2023-07-13 | End: 2023-07-13

## 2023-07-13 RX ORDER — MODIFIED LANOLIN
OINTMENT (GRAM) TOPICAL
Status: DISCONTINUED | OUTPATIENT
Start: 2023-07-13 | End: 2023-07-15 | Stop reason: HOSPADM

## 2023-07-13 RX ORDER — KETOROLAC TROMETHAMINE 30 MG/ML
30 INJECTION, SOLUTION INTRAMUSCULAR; INTRAVENOUS EVERY 6 HOURS
Status: DISPENSED | OUTPATIENT
Start: 2023-07-13 | End: 2023-07-14

## 2023-07-13 RX ORDER — CITRIC ACID/SODIUM CITRATE 334-500MG
SOLUTION, ORAL ORAL
Status: DISCONTINUED
Start: 2023-07-13 | End: 2023-07-13 | Stop reason: HOSPADM

## 2023-07-13 RX ORDER — ONDANSETRON 2 MG/ML
4 INJECTION INTRAMUSCULAR; INTRAVENOUS EVERY 6 HOURS PRN
Status: DISCONTINUED | OUTPATIENT
Start: 2023-07-13 | End: 2023-07-15 | Stop reason: HOSPADM

## 2023-07-13 RX ORDER — ONDANSETRON 2 MG/ML
INJECTION INTRAMUSCULAR; INTRAVENOUS PRN
Status: DISCONTINUED | OUTPATIENT
Start: 2023-07-13 | End: 2023-07-13

## 2023-07-13 RX ORDER — OXYTOCIN 10 [USP'U]/ML
10 INJECTION, SOLUTION INTRAMUSCULAR; INTRAVENOUS
Status: DISCONTINUED | OUTPATIENT
Start: 2023-07-13 | End: 2023-07-15 | Stop reason: HOSPADM

## 2023-07-13 RX ORDER — OXYTOCIN 10 [USP'U]/ML
10 INJECTION, SOLUTION INTRAMUSCULAR; INTRAVENOUS
Status: DISCONTINUED | OUTPATIENT
Start: 2023-07-13 | End: 2023-07-13 | Stop reason: HOSPADM

## 2023-07-13 RX ORDER — NALOXONE HYDROCHLORIDE 0.4 MG/ML
0.2 INJECTION, SOLUTION INTRAMUSCULAR; INTRAVENOUS; SUBCUTANEOUS
Status: DISCONTINUED | OUTPATIENT
Start: 2023-07-13 | End: 2023-07-15 | Stop reason: HOSPADM

## 2023-07-13 RX ORDER — SODIUM CHLORIDE, SODIUM LACTATE, POTASSIUM CHLORIDE, CALCIUM CHLORIDE 600; 310; 30; 20 MG/100ML; MG/100ML; MG/100ML; MG/100ML
INJECTION, SOLUTION INTRAVENOUS CONTINUOUS
Status: DISCONTINUED | OUTPATIENT
Start: 2023-07-13 | End: 2023-07-13

## 2023-07-13 RX ORDER — MISOPROSTOL 200 UG/1
400 TABLET ORAL
Status: DISCONTINUED | OUTPATIENT
Start: 2023-07-13 | End: 2023-07-13 | Stop reason: HOSPADM

## 2023-07-13 RX ORDER — KETOROLAC TROMETHAMINE 30 MG/ML
INJECTION, SOLUTION INTRAMUSCULAR; INTRAVENOUS PRN
Status: DISCONTINUED | OUTPATIENT
Start: 2023-07-13 | End: 2023-07-13

## 2023-07-13 RX ORDER — MORPHINE SULFATE 1 MG/ML
150 INJECTION, SOLUTION EPIDURAL; INTRATHECAL; INTRAVENOUS ONCE
Status: COMPLETED | OUTPATIENT
Start: 2023-07-13 | End: 2023-07-13

## 2023-07-13 RX ORDER — MISOPROSTOL 200 UG/1
800 TABLET ORAL
Status: DISCONTINUED | OUTPATIENT
Start: 2023-07-13 | End: 2023-07-15 | Stop reason: HOSPADM

## 2023-07-13 RX ORDER — OXYTOCIN/0.9 % SODIUM CHLORIDE 30/500 ML
340 PLASTIC BAG, INJECTION (ML) INTRAVENOUS CONTINUOUS PRN
Status: DISCONTINUED | OUTPATIENT
Start: 2023-07-13 | End: 2023-07-13 | Stop reason: HOSPADM

## 2023-07-13 RX ORDER — AMOXICILLIN 250 MG
2 CAPSULE ORAL 2 TIMES DAILY
Status: DISCONTINUED | OUTPATIENT
Start: 2023-07-13 | End: 2023-07-15 | Stop reason: HOSPADM

## 2023-07-13 RX ORDER — METHYLERGONOVINE MALEATE 0.2 MG/ML
200 INJECTION INTRAVENOUS
Status: DISCONTINUED | OUTPATIENT
Start: 2023-07-13 | End: 2023-07-13 | Stop reason: HOSPADM

## 2023-07-13 RX ORDER — LIDOCAINE 40 MG/G
CREAM TOPICAL
Status: DISCONTINUED | OUTPATIENT
Start: 2023-07-13 | End: 2023-07-15 | Stop reason: HOSPADM

## 2023-07-13 RX ORDER — DEXTROSE, SODIUM CHLORIDE, SODIUM LACTATE, POTASSIUM CHLORIDE, AND CALCIUM CHLORIDE 5; .6; .31; .03; .02 G/100ML; G/100ML; G/100ML; G/100ML; G/100ML
INJECTION, SOLUTION INTRAVENOUS CONTINUOUS
Status: DISCONTINUED | OUTPATIENT
Start: 2023-07-13 | End: 2023-07-15 | Stop reason: HOSPADM

## 2023-07-13 RX ORDER — DIPHENHYDRAMINE HCL 25 MG
25 CAPSULE ORAL EVERY 6 HOURS PRN
Status: DISCONTINUED | OUTPATIENT
Start: 2023-07-13 | End: 2023-07-15 | Stop reason: HOSPADM

## 2023-07-13 RX ORDER — OXYTOCIN/0.9 % SODIUM CHLORIDE 30/500 ML
100-340 PLASTIC BAG, INJECTION (ML) INTRAVENOUS CONTINUOUS PRN
Status: DISCONTINUED | OUTPATIENT
Start: 2023-07-13 | End: 2023-07-13

## 2023-07-13 RX ORDER — CITRIC ACID/SODIUM CITRATE 334-500MG
30 SOLUTION, ORAL ORAL ONCE
Status: DISCONTINUED | OUTPATIENT
Start: 2023-07-13 | End: 2023-07-13 | Stop reason: HOSPADM

## 2023-07-13 RX ORDER — OXYTOCIN/0.9 % SODIUM CHLORIDE 30/500 ML
PLASTIC BAG, INJECTION (ML) INTRAVENOUS CONTINUOUS PRN
Status: DISCONTINUED | OUTPATIENT
Start: 2023-07-13 | End: 2023-07-13

## 2023-07-13 RX ORDER — NALBUPHINE HYDROCHLORIDE 20 MG/ML
2.5-5 INJECTION, SOLUTION INTRAMUSCULAR; INTRAVENOUS; SUBCUTANEOUS EVERY 6 HOURS PRN
Status: DISCONTINUED | OUTPATIENT
Start: 2023-07-13 | End: 2023-07-13

## 2023-07-13 RX ORDER — ACETAMINOPHEN 325 MG/1
TABLET ORAL
Status: COMPLETED
Start: 2023-07-13 | End: 2023-07-13

## 2023-07-13 RX ORDER — PROCHLORPERAZINE 25 MG
25 SUPPOSITORY, RECTAL RECTAL EVERY 12 HOURS PRN
Status: DISCONTINUED | OUTPATIENT
Start: 2023-07-13 | End: 2023-07-15 | Stop reason: HOSPADM

## 2023-07-13 RX ORDER — SODIUM CHLORIDE, SODIUM LACTATE, POTASSIUM CHLORIDE, CALCIUM CHLORIDE 600; 310; 30; 20 MG/100ML; MG/100ML; MG/100ML; MG/100ML
INJECTION, SOLUTION INTRAVENOUS CONTINUOUS
Status: DISCONTINUED | OUTPATIENT
Start: 2023-07-13 | End: 2023-07-13 | Stop reason: HOSPADM

## 2023-07-13 RX ORDER — MISOPROSTOL 200 UG/1
400 TABLET ORAL
Status: DISCONTINUED | OUTPATIENT
Start: 2023-07-13 | End: 2023-07-15 | Stop reason: HOSPADM

## 2023-07-13 RX ORDER — BISACODYL 10 MG
10 SUPPOSITORY, RECTAL RECTAL DAILY PRN
Status: DISCONTINUED | OUTPATIENT
Start: 2023-07-15 | End: 2023-07-15 | Stop reason: HOSPADM

## 2023-07-13 RX ORDER — CEFAZOLIN SODIUM/WATER 2 G/20 ML
2 SYRINGE (ML) INTRAVENOUS SEE ADMIN INSTRUCTIONS
Status: DISCONTINUED | OUTPATIENT
Start: 2023-07-13 | End: 2023-07-13 | Stop reason: HOSPADM

## 2023-07-13 RX ORDER — CEFAZOLIN SODIUM/WATER 2 G/20 ML
SYRINGE (ML) INTRAVENOUS
Status: COMPLETED
Start: 2023-07-13 | End: 2023-07-13

## 2023-07-13 RX ORDER — MISOPROSTOL 200 UG/1
800 TABLET ORAL
Status: DISCONTINUED | OUTPATIENT
Start: 2023-07-13 | End: 2023-07-13 | Stop reason: HOSPADM

## 2023-07-13 RX ORDER — ONDANSETRON 4 MG/1
4 TABLET, ORALLY DISINTEGRATING ORAL EVERY 6 HOURS PRN
Status: DISCONTINUED | OUTPATIENT
Start: 2023-07-13 | End: 2023-07-13 | Stop reason: HOSPADM

## 2023-07-13 RX ORDER — DIPHENHYDRAMINE HYDROCHLORIDE 50 MG/ML
25 INJECTION INTRAMUSCULAR; INTRAVENOUS EVERY 6 HOURS PRN
Status: DISCONTINUED | OUTPATIENT
Start: 2023-07-13 | End: 2023-07-15 | Stop reason: HOSPADM

## 2023-07-13 RX ORDER — NALOXONE HYDROCHLORIDE 0.4 MG/ML
0.4 INJECTION, SOLUTION INTRAMUSCULAR; INTRAVENOUS; SUBCUTANEOUS
Status: DISCONTINUED | OUTPATIENT
Start: 2023-07-13 | End: 2023-07-15 | Stop reason: HOSPADM

## 2023-07-13 RX ORDER — ONDANSETRON 4 MG/1
4 TABLET, ORALLY DISINTEGRATING ORAL EVERY 6 HOURS PRN
Status: DISCONTINUED | OUTPATIENT
Start: 2023-07-13 | End: 2023-07-15 | Stop reason: HOSPADM

## 2023-07-13 RX ORDER — AMOXICILLIN 250 MG
1 CAPSULE ORAL 2 TIMES DAILY
Status: DISCONTINUED | OUTPATIENT
Start: 2023-07-13 | End: 2023-07-15 | Stop reason: HOSPADM

## 2023-07-13 RX ORDER — ACETAMINOPHEN 325 MG/1
975 TABLET ORAL EVERY 6 HOURS
Status: DISCONTINUED | OUTPATIENT
Start: 2023-07-13 | End: 2023-07-15 | Stop reason: HOSPADM

## 2023-07-13 RX ORDER — CARBOPROST TROMETHAMINE 250 UG/ML
250 INJECTION, SOLUTION INTRAMUSCULAR
Status: DISCONTINUED | OUTPATIENT
Start: 2023-07-13 | End: 2023-07-13 | Stop reason: HOSPADM

## 2023-07-13 RX ORDER — CITRIC ACID/SODIUM CITRATE 334-500MG
30 SOLUTION, ORAL ORAL
Status: DISCONTINUED | OUTPATIENT
Start: 2023-07-13 | End: 2023-07-13 | Stop reason: HOSPADM

## 2023-07-13 RX ORDER — OXYTOCIN/0.9 % SODIUM CHLORIDE 30/500 ML
340 PLASTIC BAG, INJECTION (ML) INTRAVENOUS CONTINUOUS PRN
Status: DISCONTINUED | OUTPATIENT
Start: 2023-07-13 | End: 2023-07-15 | Stop reason: HOSPADM

## 2023-07-13 RX ORDER — BUPIVACAINE HYDROCHLORIDE 7.5 MG/ML
INJECTION, SOLUTION INTRASPINAL PRN
Status: DISCONTINUED | OUTPATIENT
Start: 2023-07-13 | End: 2023-07-13

## 2023-07-13 RX ORDER — SIMETHICONE 80 MG
80 TABLET,CHEWABLE ORAL 4 TIMES DAILY PRN
Status: DISCONTINUED | OUTPATIENT
Start: 2023-07-13 | End: 2023-07-15 | Stop reason: HOSPADM

## 2023-07-13 RX ORDER — OXYTOCIN 10 [USP'U]/ML
10 INJECTION, SOLUTION INTRAMUSCULAR; INTRAVENOUS
Status: DISCONTINUED | OUTPATIENT
Start: 2023-07-13 | End: 2023-07-13

## 2023-07-13 RX ORDER — ONDANSETRON 2 MG/ML
4 INJECTION INTRAMUSCULAR; INTRAVENOUS EVERY 6 HOURS PRN
Status: DISCONTINUED | OUTPATIENT
Start: 2023-07-13 | End: 2023-07-13 | Stop reason: HOSPADM

## 2023-07-13 RX ORDER — LIDOCAINE 40 MG/G
CREAM TOPICAL
Status: DISCONTINUED | OUTPATIENT
Start: 2023-07-13 | End: 2023-07-13 | Stop reason: HOSPADM

## 2023-07-13 RX ORDER — EPHEDRINE SULFATE 50 MG/ML
5 INJECTION, SOLUTION INTRAMUSCULAR; INTRAVENOUS; SUBCUTANEOUS
Status: DISCONTINUED | OUTPATIENT
Start: 2023-07-13 | End: 2023-07-13 | Stop reason: HOSPADM

## 2023-07-13 RX ORDER — METHYLERGONOVINE MALEATE 0.2 MG/ML
200 INJECTION INTRAVENOUS
Status: DISCONTINUED | OUTPATIENT
Start: 2023-07-13 | End: 2023-07-15 | Stop reason: HOSPADM

## 2023-07-13 RX ORDER — CARBOPROST TROMETHAMINE 250 UG/ML
250 INJECTION, SOLUTION INTRAMUSCULAR
Status: DISCONTINUED | OUTPATIENT
Start: 2023-07-13 | End: 2023-07-15 | Stop reason: HOSPADM

## 2023-07-13 RX ORDER — OXYCODONE HYDROCHLORIDE 5 MG/1
5 TABLET ORAL EVERY 4 HOURS PRN
Status: DISCONTINUED | OUTPATIENT
Start: 2023-07-13 | End: 2023-07-15 | Stop reason: HOSPADM

## 2023-07-13 RX ORDER — METOCLOPRAMIDE 10 MG/1
10 TABLET ORAL EVERY 6 HOURS PRN
Status: DISCONTINUED | OUTPATIENT
Start: 2023-07-13 | End: 2023-07-15 | Stop reason: HOSPADM

## 2023-07-13 RX ORDER — TRANEXAMIC ACID 10 MG/ML
1 INJECTION, SOLUTION INTRAVENOUS EVERY 30 MIN PRN
Status: DISCONTINUED | OUTPATIENT
Start: 2023-07-13 | End: 2023-07-15 | Stop reason: HOSPADM

## 2023-07-13 RX ORDER — PROCHLORPERAZINE MALEATE 10 MG
10 TABLET ORAL EVERY 6 HOURS PRN
Status: DISCONTINUED | OUTPATIENT
Start: 2023-07-13 | End: 2023-07-15 | Stop reason: HOSPADM

## 2023-07-13 RX ORDER — HYDROCORTISONE 25 MG/G
CREAM TOPICAL 3 TIMES DAILY PRN
Status: DISCONTINUED | OUTPATIENT
Start: 2023-07-13 | End: 2023-07-15 | Stop reason: HOSPADM

## 2023-07-13 RX ORDER — METOCLOPRAMIDE HYDROCHLORIDE 5 MG/ML
10 INJECTION INTRAMUSCULAR; INTRAVENOUS EVERY 6 HOURS PRN
Status: DISCONTINUED | OUTPATIENT
Start: 2023-07-13 | End: 2023-07-15 | Stop reason: HOSPADM

## 2023-07-13 RX ORDER — IBUPROFEN 400 MG/1
800 TABLET, FILM COATED ORAL EVERY 6 HOURS
Status: DISCONTINUED | OUTPATIENT
Start: 2023-07-14 | End: 2023-07-15 | Stop reason: HOSPADM

## 2023-07-13 RX ADMIN — SENNOSIDES AND DOCUSATE SODIUM 1 TABLET: 50; 8.6 TABLET ORAL at 19:59

## 2023-07-13 RX ADMIN — SODIUM CHLORIDE, POTASSIUM CHLORIDE, SODIUM LACTATE AND CALCIUM CHLORIDE 200 ML/HR: 600; 310; 30; 20 INJECTION, SOLUTION INTRAVENOUS at 09:21

## 2023-07-13 RX ADMIN — ACETAMINOPHEN 975 MG: 325 TABLET ORAL at 10:21

## 2023-07-13 RX ADMIN — Medication 2 G: at 11:07

## 2023-07-13 RX ADMIN — KETOROLAC TROMETHAMINE 30 MG: 30 INJECTION, SOLUTION INTRAMUSCULAR at 12:26

## 2023-07-13 RX ADMIN — PHENYLEPHRINE HYDROCHLORIDE 0.5 MCG/KG/MIN: 10 INJECTION INTRAVENOUS at 11:23

## 2023-07-13 RX ADMIN — BUPIVACAINE HYDROCHLORIDE IN DEXTROSE 12 MG: 7.5 INJECTION, SOLUTION SUBARACHNOID at 11:23

## 2023-07-13 RX ADMIN — KETOROLAC TROMETHAMINE 30 MG: 30 INJECTION, SOLUTION INTRAMUSCULAR; INTRAVENOUS at 19:58

## 2023-07-13 RX ADMIN — SODIUM CHLORIDE, POTASSIUM CHLORIDE, SODIUM LACTATE AND CALCIUM CHLORIDE: 600; 310; 30; 20 INJECTION, SOLUTION INTRAVENOUS at 11:07

## 2023-07-13 RX ADMIN — ONDANSETRON 4 MG: 2 INJECTION INTRAMUSCULAR; INTRAVENOUS at 11:12

## 2023-07-13 RX ADMIN — MORPHINE SULFATE 150 MCG: 1 INJECTION, SOLUTION EPIDURAL; INTRATHECAL; INTRAVENOUS at 11:23

## 2023-07-13 RX ADMIN — ACETAMINOPHEN 975 MG: 325 TABLET, FILM COATED ORAL at 10:21

## 2023-07-13 RX ADMIN — ACETAMINOPHEN 975 MG: 325 TABLET, FILM COATED ORAL at 19:59

## 2023-07-13 RX ADMIN — Medication 340 ML/HR: at 11:49

## 2023-07-13 RX ADMIN — ONDANSETRON 4 MG: 2 INJECTION INTRAMUSCULAR; INTRAVENOUS at 12:13

## 2023-07-13 RX ADMIN — METOCLOPRAMIDE 10 MG: 5 INJECTION, SOLUTION INTRAMUSCULAR; INTRAVENOUS at 15:34

## 2023-07-13 ASSESSMENT — ACTIVITIES OF DAILY LIVING (ADL)
ADLS_ACUITY_SCORE: 18
WALKING_OR_CLIMBING_STAIRS_DIFFICULTY: NO
DOING_ERRANDS_INDEPENDENTLY_DIFFICULTY: NO
TOILETING_ISSUES: NO
DRESSING/BATHING_DIFFICULTY: NO
CHANGE_IN_FUNCTIONAL_STATUS_SINCE_ONSET_OF_CURRENT_ILLNESS/INJURY: NO
ADLS_ACUITY_SCORE: 18
ADLS_ACUITY_SCORE: 22
ADLS_ACUITY_SCORE: 33
CONCENTRATING,_REMEMBERING_OR_MAKING_DECISIONS_DIFFICULTY: NO
WEAR_GLASSES_OR_BLIND: NO
HEARING_DIFFICULTY_OR_DEAF: NO
ADLS_ACUITY_SCORE: 22
ADLS_ACUITY_SCORE: 22
ADLS_ACUITY_SCORE: 18
FALL_HISTORY_WITHIN_LAST_SIX_MONTHS: NO
ADLS_ACUITY_SCORE: 18
DIFFICULTY_EATING/SWALLOWING: NO
DIFFICULTY_COMMUNICATING: NO

## 2023-07-13 NOTE — INTERVAL H&P NOTE
I have reviewed the surgical (or preoperative) H&P that is linked to this encounter, and examined the patient. There are no significant changes    Clinical Conditions Present on Arrival:  Clinically Significant Risk Factors Present on Admission        # Hypokalemia: Lowest K = 3.1 mmol/L in last 30 days, will replace as needed  # Hyponatremia: Lowest Na = 135 mmol/L in last 30 days, will monitor as appropriate      # Hypoalbuminemia: Lowest albumin = 2.9 g/dL in the past 30 days , will monitor as appropriate    # Drug Induced Platelet Defect: home medication list includes an antiplatelet medication

## 2023-07-13 NOTE — H&P
[facility]  OB History and Physical      Scot Haynes MRN# [id number]   Age: 27 year old YOB: 1995     CC:  Scheduled repeat CS    HPI:  Ms. Scot Haynes is a 27 year old  at 39w1d who presents for scheduled RLTCS. She denies contractions, vaginal bleeding, and loss of fluid. + normal fetal movement. Denies symptoms of HA, vision changes, SOB, chest pain, fevers, chills, palpitations, nausea, vomiting, RUQ pain, dysuria, constipation, diarrhea.     Conditions affecting pregnancy  - Obesity, BMI 36  - Hx of CS x 1  - GBS positive    Prenatal Labs:   Lab Results   Component Value Date    ABO O 02/15/2021    RH Pos 02/15/2021    AS Negative 2023    HEPBANG Nonreactive 2023    CHPCRT Negative 2023    GCPCRT Negative 2023    HGB 11.3 (L) 2023       GBS Status: positive    OB History  OB History    Para Term  AB Living   2 1 1 0 0 1   SAB IAB Ectopic Multiple Live Births   0 0 0 0 1      # Outcome Date GA Lbr Jakub/2nd Weight Sex Delivery Anes PTL Lv   2 Current            1 Term 21 40w1d 08:23 / 04:59 3.71 kg (8 lb 2.9 oz) F CS-LTranv EPI N ELVA      Complications: Cephalopelvic Disproportion, Failure to Progress in Second Stage      Name: SOLANGE,FEMALE-SCOT      Apgar1: 9  Apgar5: 9       PMHx:   History reviewed. No pertinent past medical history.    PSHx:   Past Surgical History:   Procedure Laterality Date      SECTION Bilateral 2021    Procedure:  SECTION;  Surgeon: Patricia Hoskins MD;  Location:  L+D     TONSILLECTOMY & ADENOIDECTOMY Bilateral 1998     Meds:   Medications Prior to Admission   Medication Sig Dispense Refill Last Dose     aspirin (ASA) 81 MG chewable tablet Take 81 mg by mouth daily   2023     cyclobenzaprine (FLEXERIL) 5 MG tablet Take 1-2 tablets (5-10 mg) by mouth 3 times daily as needed for muscle spasms 10 tablet 0 Past Week     Prenatal Vit-Fe Fumarate-FA  "(PRENATAL VITAMIN PO) Take 1 tablet by mouth   2023     Allergies:    Allergies   Allergen Reactions     Clindamycin Hives      FmHx:   Family History   Problem Relation Age of Onset     Coronary Artery Disease Early Onset Father      Hypertension Father      Diabetes Paternal Grandfather      Coronary Artery Disease Early Onset Paternal Grandfather      Cancer Paternal Grandmother        PE:  Vit:   Patient Vitals for the past 4 hrs:   BP Temp Resp Height Weight   23 0923 -- -- 16 1.626 m (5' 4\") 116.1 kg (256 lb)   23 0900 126/82 98.7  F (37.1  C) -- -- --      Gen: Well-appearing, NAD, comfortable   CV: Regular rate  Pulm: Breathing comfortably  Abd: Soft, gravid, non-tender  Ext: trace LE edema b/l                FHT: Baseline 150bpm, moderate variability, + accelerations, no decelerations   Ludell: 2-3 contractions in 10 minutes        Assessment  Ms. Kendra Haynes is a 27 year old , at 39w1d who presents for scheduled repeat CS.    Plan  - Labor   - Admit to L&D for CS.    - PNC:     - Rh positie. Rubella immune. GBS positive.    - Fen/GI: Clear liquid diet, IVF   - Plan: Proceed with surgery. Signed informed consent obtained.     -Fetal Well Being   - Category I FHT. Reactive and reassuring       Sharri Epstein MD, S  OB/GYN  2023  "

## 2023-07-13 NOTE — ANESTHESIA CARE TRANSFER NOTE
Patient: Kendra Haynes    Procedure: Procedure(s):  Repeat  Section       Diagnosis: Previous  delivery, antepartum [O34.219]  Diagnosis Additional Information: No value filed.    Anesthesia Type:   Epidural     Note:    Oropharynx: oropharynx clear of all foreign objects  Level of Consciousness: awake  Oxygen Supplementation: room air    Independent Airway: airway patency satisfactory and stable    Vital Signs Stable: post-procedure vital signs reviewed and stable  Report to RN Given: handoff report given  Patient transferred to: Labor and Delivery    Handoff Report: Identifed the Patient, Identified the Reponsible Provider, Reviewed the pertinent medical history, Discussed the surgical course, Reviewed Intra-OP anesthesia mangement and issues during anesthesia, Set expectations for post-procedure period and Allowed opportunity for questions and acknowledgement of understanding      Vitals:  Vitals Value Taken Time   /70 23 1239   Temp     Pulse 68 23 1240   Resp 17 23 1240   SpO2 96 % 23 1240   Vitals shown include unvalidated device data.    Electronically Signed By: ADALID Rayo CRNA  2023  12:41 PM

## 2023-07-13 NOTE — PLAN OF CARE
Goal Outcome Evaluation:       PT arrived from home for scheduled repeat C/S. Prenatal record reviewed and health history obtained from patient. PT placed on EFM. QUestions answered.

## 2023-07-13 NOTE — ANESTHESIA POSTPROCEDURE EVALUATION
Patient: Kendra Haynes    Procedure: Procedure(s):  Repeat  Section       Anesthesia Type:  Epidural    Note:     Postop Pain Control: Uneventful            Sign Out: Well controlled pain   PONV: No   Neuro/Psych: Uneventful            Sign Out: Acceptable/Baseline neuro status   Airway/Respiratory: Uneventful            Sign Out: Acceptable/Baseline resp. status   CV/Hemodynamics: Uneventful            Sign Out: Acceptable CV status; No obvious hypovolemia; No obvious fluid overload   Other NRE: NONE   DID A NON-ROUTINE EVENT OCCUR?            Last vitals:  Vitals Value Taken Time   /80 23 1345   Temp     Pulse 73 23 1345   Resp 16 23 1345   SpO2 97 % 23 1345       Electronically Signed By: Cosmo Caro DO, DO  2023  3:18 PM

## 2023-07-13 NOTE — ANESTHESIA PROCEDURE NOTES
"Intrathecal Procedure Note    Pre-Procedure   Staff -        Anesthesiologist:  Cosmo Caro DO       Performed By: anesthesiologist       Location: OR       Pre-Anesthestic Checklist: patient identified, IV checked, site marked, risks and benefits discussed, informed consent, monitors and equipment checked, pre-op evaluation, at physician/surgeon's request and post-op pain management  Timeout:       Correct Patient: Yes        Correct Procedure: Yes        Correct Site: Yes        Correct Position: Yes   Procedure Documentation  Procedure: intrathecal       Patient Position: sitting       Patient Prep/Sterile Barriers: sterile gloves, mask, patient draped       Skin prep: Betadine       Insertion Site: L3-4. (midline approach).       Spinal Needle Type: Golden tip       Introducer used       Introducer: 20 G       # of attempts: 1 and  # of redirects:     Assessment/Narrative         CSF fluid: clear.       Opening pressure was cmH2O while  Sitting.       Comments:  1.6ml 0.75% Bupiv with dextrose, and 0.15mg Duramorph      FOR [facility] ONLY:   Pain Team Contact information: please page the Pain Team Via [employer]. Search \"Pain\". During daytime hours, please page the attending first. At night please page the resident first.      "

## 2023-07-13 NOTE — PLAN OF CARE
Goal Outcome Evaluation:  Pt. admitted from L&D via bed.. Pt. arrived with baby and was accompanied by spouse Juan J and arrived with personal belongings. Report was taken from Mary TONEY in L&D.  Pt. is A&Ox3 and VSS on RA. Fundus is firm and midline.  Vaginal bleeding is light.   Pt. c/o 0/10 pain. Pt. denied CP, SOB, lightheadedness, or dizziness.  Pt does c/o nausea, given IV reglan with relief.  Pt tolerating clear liquids. LS CTA and BS positive but hypo. PIV patent and infusing.  Matthews patent and draining, per order removed at 1820, pt due to attempt void by 2020.  Dressing to lower abdomen CDI.  Pneumoboots in place to BLE.  Pt breastfeeding with minimal assist.  Pt. oriented to the room and call light system.

## 2023-07-13 NOTE — OP NOTE
[facility]  Operative Note     Surgery Date:  2023    Surgeon:  Sharri Epstein MD, S    Assistants:  Anjelica Gill MD. Assistance was required due to this being a repeat  with higher risk of scar tissue as well as the patient's BMI and body habitus. With Dr. Gill's assistance, we were able to improve retraction and visualization and minimize injury and blood loss.        Pre-op Diagnosis:    - Intrauterine pregnancy at 39w1d  - Hx of CS x 1  - Obesity, BMI 36  - GBS positive    Post-op Diagnosis:    - Same, s/p procedure below  - Liveborn male infant    Procedure:  Repeat low-transverse  section via Pfannenstiel incision    Anesthesia: Spinal    Delivery QBL (mL): 535    Drains: Matthews Catheter     Specimens:  Cord blood    Complications: None apparent    Indications:   Kendra Haynes is a 27 year old  at 39w1d admitted for scheduled repeat CS.  The risks, benefits, and alternatives of  section were discussed with the patient, and she agreed to proceed.     Findings:   1. Clear amniotic fluid  2. Liveborn male infant in cephalic presentation. Apgars 8 at 1 minute & 9 at 5 minutes. Weight [id number].  3. Normal uterus, fallopian tubes, and ovaries. Mild rectofascial adhesions, no intraabdominal adhesions.     Procedure Details:   The patient was brought to the OR, where adequate spinal anesthesia was administered.  She was placed in the dorsal supine position with a slight leftward tilt. She was prepped and draped in the usual sterile fashion. A surgical time out was performed. A Pfannenstiel skin incision was made over the previous incision with the scalpel, and carried down to the underlying fascia with sharp and blunt dissection. The fascia was incised in the midline, and the incision was extended laterally with blunt traction superiorly and inferiorly off of underlying rectus, and laterally and with cautery.  The rectus muscles were  in the midline  using a Erika. Using caution, the superior aspect of the scarred rectus was  using cautery, and the peritoneum was entered bluntly, and the opening was extended with digital pressure. The bladder blade was placed. A transverse hysterotomy was made with the scalpel in the lower uterine segment, and the incision was extended with digital pressure. The infant was noted to be in the cephalic position, and was delivered atraumatically. The shoulders delivered easily.  No nuchal cord was noted. The cord was doubly clamped and cut, and the infant was handed off to the awaiting nursery staff. A segment of cord was cut and saved. The placenta was delivered with gentle traction on the umbilical cord and uterine massage. The uterus was exteriorized and cleared of all clots and debris. Uterine tone was noted to be firm. The hysterotomy was closed with a running locked suture of 0 Vicryl.  Areas of bleeding on hysterotomy at the left aspect were controleld using an 0 Monocryl suture. The hysterotomy was then noted to be hemostatic. Areas of bleeding inferior to the hysterotomy at the bladder serosa were controlled with cautery. Surgicel powder was placed on these areas. The posterior cul-de-sac was cleared of all clots and debris. The uterus was returned to the abdomen. The pericolic gutters were cleared of all clots and debris. The hysterotomy was reexamined and noted to be hemostatic. Additional Surgicel powder was placed on the hysterotomy The fascia and rectus muscles were examined and areas of oozing were controlled with electrocautery. The fascia was closed with a running 0 Vicryl suture. The subcutaneous tissue was irrigated and areas of oozing were controlled with electrocautery. The subcutaneous tissue was closed with 2-0 Plain Gut. The skin was closed with 4-0 monocryl and covered with a sterile dressing.    All sponge, needle, and instrument counts were correct. The patient tolerated the procedure well, and  was transferred to recovery in stable condition.     Sharri Epstein MD  OB/GYN  7/13/2023

## 2023-07-14 LAB — HGB BLD-MCNC: 11.3 G/DL (ref 11.7–15.7)

## 2023-07-14 PROCEDURE — 120N000012 HC R&B POSTPARTUM

## 2023-07-14 PROCEDURE — 36415 COLL VENOUS BLD VENIPUNCTURE: CPT | Performed by: STUDENT IN AN ORGANIZED HEALTH CARE EDUCATION/TRAINING PROGRAM

## 2023-07-14 PROCEDURE — 85018 HEMOGLOBIN: CPT | Performed by: STUDENT IN AN ORGANIZED HEALTH CARE EDUCATION/TRAINING PROGRAM

## 2023-07-14 PROCEDURE — 250N000013 HC RX MED GY IP 250 OP 250 PS 637: Performed by: STUDENT IN AN ORGANIZED HEALTH CARE EDUCATION/TRAINING PROGRAM

## 2023-07-14 PROCEDURE — 250N000011 HC RX IP 250 OP 636: Mod: JZ | Performed by: STUDENT IN AN ORGANIZED HEALTH CARE EDUCATION/TRAINING PROGRAM

## 2023-07-14 RX ADMIN — ACETAMINOPHEN 975 MG: 325 TABLET, FILM COATED ORAL at 14:22

## 2023-07-14 RX ADMIN — IBUPROFEN 800 MG: 400 TABLET ORAL at 19:59

## 2023-07-14 RX ADMIN — OXYCODONE HYDROCHLORIDE 5 MG: 5 TABLET ORAL at 15:50

## 2023-07-14 RX ADMIN — SIMETHICONE 80 MG: 80 TABLET, CHEWABLE ORAL at 14:25

## 2023-07-14 RX ADMIN — ACETAMINOPHEN 975 MG: 325 TABLET, FILM COATED ORAL at 19:59

## 2023-07-14 RX ADMIN — SENNOSIDES AND DOCUSATE SODIUM 1 TABLET: 50; 8.6 TABLET ORAL at 08:29

## 2023-07-14 RX ADMIN — SENNOSIDES AND DOCUSATE SODIUM 2 TABLET: 50; 8.6 TABLET ORAL at 19:59

## 2023-07-14 RX ADMIN — OXYCODONE HYDROCHLORIDE 5 MG: 5 TABLET ORAL at 11:36

## 2023-07-14 RX ADMIN — OXYCODONE HYDROCHLORIDE 5 MG: 5 TABLET ORAL at 19:58

## 2023-07-14 RX ADMIN — SENNOSIDES AND DOCUSATE SODIUM 1 TABLET: 50; 8.6 TABLET ORAL at 11:36

## 2023-07-14 RX ADMIN — IBUPROFEN 800 MG: 400 TABLET ORAL at 14:22

## 2023-07-14 RX ADMIN — ACETAMINOPHEN 975 MG: 325 TABLET, FILM COATED ORAL at 08:28

## 2023-07-14 RX ADMIN — SIMETHICONE 80 MG: 80 TABLET, CHEWABLE ORAL at 11:36

## 2023-07-14 RX ADMIN — KETOROLAC TROMETHAMINE 30 MG: 30 INJECTION, SOLUTION INTRAMUSCULAR; INTRAVENOUS at 08:29

## 2023-07-14 RX ADMIN — ACETAMINOPHEN 975 MG: 325 TABLET, FILM COATED ORAL at 02:31

## 2023-07-14 ASSESSMENT — ACTIVITIES OF DAILY LIVING (ADL)
ADLS_ACUITY_SCORE: 18
ADLS_ACUITY_SCORE: 21
ADLS_ACUITY_SCORE: 18
ADLS_ACUITY_SCORE: 18
ADLS_ACUITY_SCORE: 22
ADLS_ACUITY_SCORE: 18
ADLS_ACUITY_SCORE: 18
ADLS_ACUITY_SCORE: 21

## 2023-07-14 NOTE — PLAN OF CARE
Vitals within defined limits. Dressing to  incision clean, dry, in place. Fundus firm. Lochia scant. Using Tylenol/Toradol for incisional pain with good relief. Abdominal binder/ice pack on for comfort. Lung sounds clear. Bowel sounds hypoactive, patient denies passing gas. Up to bathroom with standby assist, tolerating activity well. Voiding without issues. Breastfeeding every 3 hours and per cues, assisted with latching/positioning overnight.

## 2023-07-14 NOTE — PLAN OF CARE
Fundus firm and bleeding wnl.  VSS.  Incision clean, dry, intact and covered with steri strips.  Rates pain 5-6/10 and taking tylenol, ibuprofen and oxycodone with good relief.  Up independently. Abdomen distended, hasn't passed gas, pt walked the hallway several times, Simethicone given. Tolerating regular diet.  Encouraged to call with questions or concerns.       Goal Outcome Evaluation:      Plan of Care Reviewed With: patient, spouse    Overall Patient Progress: improvingOverall Patient Progress: improving

## 2023-07-14 NOTE — PROGRESS NOTES
"Post Partum Progress Note    Subjective:  Patient is doing well. Has distended abdomen- no flatus or BM yet. Took gas X, which hasn't worked. No N/V. Tolerating diet.  No complaints. Moderate lochia.   Ambulating without any issues. Denies any fever, chills, SOB, chest pain, N/V,  headache, dizziness.  Planning on breast feeding.      Objective:  Patient Vitals for the past 24 hrs:   BP Temp Temp src Pulse Resp SpO2 Height Weight   23 0400 112/70 98  F (36.7  C) -- 80 16 98 % -- --   23 0100 121/72 97.6  F (36.4  C) Axillary 83 18 100 % -- --   23 2200 -- -- -- -- -- 100 % -- --   23 1952 125/62 98  F (36.7  C) -- 77 18 100 % -- --   23 1845 -- -- -- -- 16 100 % -- --   23 1745 116/79 -- -- -- 16 100 % -- --   23 1645 122/76 -- -- -- 16 100 % -- --   23 1545 114/79 -- -- -- 16 100 % -- --   23 1515 126/78 98.4  F (36.9  C) Axillary -- 16 99 % -- --   23 1400 118/60 -- -- 80 16 97 % -- --   23 1345 122/80 -- -- 73 16 97 % -- --   23 1330 116/70 -- -- 62 23 96 % -- --   23 1315 111/68 -- -- 69 16 97 % -- --   23 1300 118/64 -- -- 84 22 96 % -- --   23 1245 114/81 -- -- 75 16 (!) 85 % -- --   23 0923 -- -- -- -- 16 -- 1.626 m (5' 4\") 116.1 kg (256 lb)   23 0900 126/82 98.7  F (37.1  C) -- -- -- -- -- --       General: NAD, appears generally well  CV:  Regular rate  Resp:  Breathing comfortably on RA  Abd:  Soft, appropriately tender, moderately distended, fundus firm at approximately 2 cm below umbilicus, incision c/d/i with overlying steri strips  Ext:  trace edema in bilateral LE    Assessment and Plan:  27 year old old  post-partum day 1 s/p RLTCS.  AFVSS.  Doing well without any complaints.    -Hgb 11.3 > Delivery QBL (mL): 535> 11.3  -Rh positive; no rhogam needed  -Rubella immune; no MMR needed  -Breast feeding  -Plan to DC #2-3 pending return of bowel function     Sharri Epstein MD, S  23     "

## 2023-07-14 NOTE — DISCHARGE SUMMARY
DELIVERY DISCHARGE SUMMARY    Admit date: 2023  Discharge date: 7/15/23  Discharge physician: Dr. Joel    Admit Dx:   - 27 year old year old  @ 39w1d GA  - Hx of CS x 1  - Obesity, BMI 36  - GBS positive    Discharge Dx:  - Same as above, s/p RLTCS      Procedures:  - Spinal anesthesia  - RLTCS via Pfannenstiel Incision     Hospital course:  Kendra Haynes was admitted as a  at 39w1d for scheduled RLTCS.  Delivery was unremarkable.  Please see her admission H&P, Delivery Summary, and Operative Note for full details regarding her admission and delivery.    Her postoperative course was uncomplicated. On POD#2, she was meeting all of her postpartum goals and deemed stable for discharge. She was voiding without difficulty, tolerating a regular diet without nausea and vomiting, her pain was well controlled on oral pain medicines and her lochia was appropriate. Her hemoglobin prior to delivery was 11.3 and after delivery was 11.3. Her Rh status was positive and RHOgam was not indicated. At the time of discharge, she was breast feeding her infant.      Discharge/Disposition:  Kendra Haynes was discharged to home in stable condition with the following instructions/medications:  1) Call for temperature > 100.4, foul smelling vaginal discharge, bleeding > 1 pad per hour x 2 hrs, pain not controlled by oral pain meds, severe constipation or severe nausea or vomiting.  2) She was instructed to follow-up with her primary OB in 6 weeks for a routine postpartum visit  3) She was instructed to continue her PNV on discharge if she wished to breast feed her infant.  4) She was discharged home with the following medications:      Review of your medicines      START taking      Dose / Directions   ibuprofen 800 MG tablet  Commonly known as: ADVIL/MOTRIN  Used for:  delivery delivered      Dose: 800 mg  Take 1 tablet (800 mg) by mouth every 6 hours as needed for moderate pain  Quantity: 40  tablet  Refills: 0     oxyCODONE 5 MG tablet  Commonly known as: ROXICODONE  Used for:  delivery delivered      Dose: 5 mg  Take 1 tablet (5 mg) by mouth every 4 hours as needed for moderate pain  Quantity: 18 tablet  Refills: 0     polyethylene glycol 17 GM/Dose powder  Commonly known as: MIRALAX  Used for:  delivery delivered      Dose: 1 Capful  Take 17 g (1 Capful) by mouth daily  Quantity: 527 g  Refills: 0     senna-docusate 8.6-50 MG tablet  Commonly known as: SENOKOT-S/PERICOLACE  Used for:  delivery delivered      Dose: 1-2 tablet  Take 1-2 tablets by mouth 2 times daily as needed for constipation  Quantity: 20 tablet  Refills: 0     triamcinolone 0.1 % external cream  Commonly known as: KENALOG  Used for: Contact dermatitis due to other chemical product, unspecified contact dermatitis type      Apply topically 2 times daily  Quantity: 60 g  Refills: 0        CONTINUE these medicines which have NOT CHANGED      Dose / Directions   PRENATAL VITAMIN PO      Dose: 1 tablet  Take 1 tablet by mouth  Refills: 0        STOP taking    aspirin 81 MG chewable tablet  Commonly known as: ASA        cyclobenzaprine 5 MG tablet  Commonly known as: FLEXERIL              Where to get your medicines      These medications were sent to [facility] [place] - [address] [address]  [address] [address] [address]    Phone: [phone]     ibuprofen 800 MG tablet    oxyCODONE 5 MG tablet    polyethylene glycol 17 GM/Dose powder    senna-docusate 8.6-50 MG tablet    triamcinolone 0.1 % external cream         Sharri Epstein MD, S  7/15/23

## 2023-07-15 VITALS
DIASTOLIC BLOOD PRESSURE: 77 MMHG | TEMPERATURE: 98.2 F | RESPIRATION RATE: 16 BRPM | SYSTOLIC BLOOD PRESSURE: 113 MMHG | WEIGHT: 256 LBS | OXYGEN SATURATION: 98 % | HEIGHT: 64 IN | BODY MASS INDEX: 43.71 KG/M2 | HEART RATE: 96 BPM

## 2023-07-15 PROCEDURE — 250N000013 HC RX MED GY IP 250 OP 250 PS 637: Performed by: STUDENT IN AN ORGANIZED HEALTH CARE EDUCATION/TRAINING PROGRAM

## 2023-07-15 RX ORDER — IBUPROFEN 800 MG/1
800 TABLET, FILM COATED ORAL EVERY 6 HOURS PRN
Qty: 40 TABLET | Refills: 0 | Status: SHIPPED | OUTPATIENT
Start: 2023-07-15 | End: 2024-01-18

## 2023-07-15 RX ORDER — AMOXICILLIN 250 MG
1-2 CAPSULE ORAL 2 TIMES DAILY PRN
Qty: 20 TABLET | Refills: 0 | Status: SHIPPED | OUTPATIENT
Start: 2023-07-15 | End: 2024-01-18

## 2023-07-15 RX ORDER — TRIAMCINOLONE ACETONIDE 1 MG/G
CREAM TOPICAL 2 TIMES DAILY
Qty: 60 G | Refills: 0 | Status: SHIPPED | OUTPATIENT
Start: 2023-07-15 | End: 2023-08-07

## 2023-07-15 RX ORDER — OXYCODONE HYDROCHLORIDE 5 MG/1
5 TABLET ORAL EVERY 4 HOURS PRN
Qty: 18 TABLET | Refills: 0 | Status: SHIPPED | OUTPATIENT
Start: 2023-07-15 | End: 2023-08-07

## 2023-07-15 RX ORDER — POLYETHYLENE GLYCOL 3350 17 G/17G
1 POWDER, FOR SOLUTION ORAL DAILY
Qty: 527 G | Refills: 0 | Status: SHIPPED | OUTPATIENT
Start: 2023-07-15 | End: 2024-01-18

## 2023-07-15 RX ADMIN — ACETAMINOPHEN 975 MG: 325 TABLET, FILM COATED ORAL at 01:13

## 2023-07-15 RX ADMIN — OXYCODONE HYDROCHLORIDE 5 MG: 5 TABLET ORAL at 05:30

## 2023-07-15 RX ADMIN — OXYCODONE HYDROCHLORIDE 5 MG: 5 TABLET ORAL at 01:13

## 2023-07-15 RX ADMIN — ACETAMINOPHEN 975 MG: 325 TABLET, FILM COATED ORAL at 13:33

## 2023-07-15 RX ADMIN — BISACODYL 10 MG: 10 SUPPOSITORY RECTAL at 07:43

## 2023-07-15 RX ADMIN — IBUPROFEN 800 MG: 400 TABLET ORAL at 01:14

## 2023-07-15 RX ADMIN — OXYCODONE HYDROCHLORIDE 5 MG: 5 TABLET ORAL at 13:42

## 2023-07-15 RX ADMIN — OXYCODONE HYDROCHLORIDE 5 MG: 5 TABLET ORAL at 09:21

## 2023-07-15 RX ADMIN — SENNOSIDES AND DOCUSATE SODIUM 2 TABLET: 50; 8.6 TABLET ORAL at 07:34

## 2023-07-15 RX ADMIN — IBUPROFEN 800 MG: 400 TABLET ORAL at 13:33

## 2023-07-15 RX ADMIN — ACETAMINOPHEN 975 MG: 325 TABLET, FILM COATED ORAL at 07:33

## 2023-07-15 RX ADMIN — IBUPROFEN 800 MG: 400 TABLET ORAL at 07:34

## 2023-07-15 ASSESSMENT — ACTIVITIES OF DAILY LIVING (ADL)
ADLS_ACUITY_SCORE: 18

## 2023-07-15 NOTE — PROGRESS NOTES
Patient A&Ox4. VSS on RA. Up IND. Voiding spontaneously. Fundus firm at U, lochia scant rubra. Pain controlled with PRN tyl, ibu & oxycodone. C/S incision CDI with steri strips intact. Attentive to infant. Breastfeeding on demand.

## 2023-07-15 NOTE — PLAN OF CARE
D: VSS, assessments WDL.   I: Pt. received complete discharge paperwork and home medications as filled by discharge pharmacy.  Pt. was given times of last dose for all discharge medications in writing on discharge medication sheets.  Discharge teaching included home medication, pain management, activity restrictions, postpartum cares, and signs and symptoms of infection.    A: Discharge outcomes on care plan met.  Mother states understanding and comfort with self care and follow up care.   P: Pt. Discharged.  Pt. was accompanied by  and left with personal belongings.  Pt. to follow up with OB provider per discharge instructions.  Pt. had no further questions at the time of discharge and no unmet needs were identified.     Goal Outcome Evaluation:      Plan of Care Reviewed With: patient, spouse    Overall Patient Progress: improvingOverall Patient Progress: improving

## 2023-07-15 NOTE — PROGRESS NOTES
"S: Pt doing well. Infant is being . Pain is controlled with current analgesics.  Medication(s) being used: acetaminophen, ibuprofen (OTC) and narcotic analgesics including oxycodone. Had a suppository yesterday due to bad gas pains. Had some flatus and felt better but still not a lot and no BM yet.    O: /77   Pulse 96   Temp 98.2  F (36.8  C) (Axillary)   Resp 16   Ht 1.626 m (5' 4\")   Wt 116.1 kg (256 lb)   LMP 10/12/2022   SpO2 98%   Breastfeeding Unknown   BMI 43.94 kg/m          ABD:  Uterine fundus is firm, non-tender and at the level of the umbilicus  Has erythematous maculopapular rash lower abd mostly just above incision and to the right that is pruritic       INC: clean/dry/intact                Hemoglobin   Date Value Ref Range Status   07/14/2023 11.3 (L) 11.7 - 15.7 g/dL Final   05/18/2021 12.0 11.7 - 15.7 g/dL Final            Lab Results   Component Value Date    RH Pos 02/15/2021       A: Post-op Day #2 s/p C/Section    P: Continue Post Op Cares    Patient requesting d/c today if infant is cleared by peds    Reviewed d/c instructions, restrictions, pain meds, bowel regimen and will also rx steroid lotion for contact dermatitis    F/up with Dr. Epstein in 6 weeks  "

## 2023-07-15 NOTE — DISCHARGE INSTRUCTIONS
Warning Signs after Having a Baby    Keep this paper on your fridge or somewhere else where you can see it.    Call your provider if you have any of these symptoms up to 12 weeks after having your baby.    Thoughts of hurting yourself or your baby  Pain in your chest or trouble breathing  Severe headache not helped by pain medicine  Eyesight concerns (blurry vision, seeing spots or flashes of light, other changes to eyesight)  Fainting, shaking or other signs of a seizure    Call 9-1-1 if you feel that it is an emergency.     The symptoms below can happen to anyone after giving birth. They can be very serious. Call your provider if you have any of these warning signs.    My provider s phone number: _______________________    Losing too much blood (hemorrhage)    Call your provider if you soak through a pad in less than an hour or pass blood clots bigger than a golf ball. These may be signs that you are bleeding too much.    Blood clots in the legs or lungs    After you give birth, your body naturally clots its blood to help prevent blood loss. Sometimes this increased clotting can happen in other areas of the body, like the legs or lungs. This can block your blood flow and be very dangerous.     Call your provider if you:  Have a red, swollen spot on the back of your leg that is warm or painful when you touch it.   Are coughing up blood.     Infection    Call your provider if you have any of these symptoms:  Fever of 100.4 F (38 C) or higher.  Pain or redness around your stitches if you had an incision.   Any yellow, white, or green fluid coming from places where you had stitches or surgery.    Mood Problems (postpartum depression)    Many people feel sad or have mood changes after having a baby. But for some people, these mood swings are worse.     Call your provider right away if you feel so anxious or nervous that you can't care for yourself or your baby.    Preeclampsia (high blood pressure)    Even if you  didn't have high blood pressure when you were pregnant, you are at risk for the high blood pressure disease called preeclampsia. This risk can last up to 12 weeks after giving birth.     Call your provider if you have:   Pain on your right side under your rib cage  Sudden swelling in the hands and face    Remember: You know your body. If something doesn't feel right, get medical help.     For informational purposes only. Not to replace the advice of your health care provider. Copyright 2020 [facility]. All rights reserved. Clinically reviewed by Tiny Perrin, RNC-OB, MSN. [employer] [id number] - Rev 02/23.

## 2023-07-18 ENCOUNTER — OFFICE VISIT (OUTPATIENT)
Dept: OBGYN | Facility: CLINIC | Age: 28
End: 2023-07-18
Payer: COMMERCIAL

## 2023-07-18 VITALS — BODY MASS INDEX: 42.47 KG/M2 | WEIGHT: 247.4 LBS | SYSTOLIC BLOOD PRESSURE: 120 MMHG | DIASTOLIC BLOOD PRESSURE: 72 MMHG

## 2023-07-18 DIAGNOSIS — T81.89XA PROBLEM INVOLVING SURGICAL INCISION: Primary | ICD-10-CM

## 2023-07-18 PROCEDURE — 99024 POSTOP FOLLOW-UP VISIT: CPT | Performed by: STUDENT IN AN ORGANIZED HEALTH CARE EDUCATION/TRAINING PROGRAM

## 2023-07-18 NOTE — PROGRESS NOTES
[facility]  OB/GYN Clinic Note    SUBJECTIVE:                                                   Kendra Haynes is a 27 year old  female who presents to clinic today for the following health issue(s):  Patient presents with:  Follow Up: Incision check    Additional information:incision check    HPI:  Patient is PPD#5 s/p RLTCS. Sent in message yesterday due to bleeding from incision site. Pain is present on left side, but not near area of bleeding on the right. Had first BM yesterday and thinks she may have strained too much.     Today's PHQ-2 Score:       2023    10:48 AM   PHQ-2 (  [employer])   Q1: Little interest or pleasure in doing things 0   Q2: Feeling down, depressed or hopeless 0   PHQ-2 Score 0   Q1: Little interest or pleasure in doing things Not at all   Q2: Feeling down, depressed or hopeless Not at all   PHQ-2 Score 0         OBJECTIVE:     /72   Wt 112.2 kg (247 lb 6.4 oz)   LMP 10/12/2022   BMI 42.47 kg/m    Body mass index is 42.47 kg/m .    Exam:  Constitutional:  Appearance: Well nourished, well developed alert, in no acute distress  Gastrointestinal:  Abdominal Examination:  Abdomen nontender to palpation, tone normal without rigidity or guarding, no masses present, umbilicus without lesions  Incision: c/d/I. At right side, small amount of bruising, and with expression, scant dark red bloody fluid extrutded. Incision was reinforced with steri- strips, pressure dressing. No active bleeding        ASSESSMENT/PLAN:                                                        ICD-10-CM    1. Problem involving surgical incision  T81.89XA           Kendra Haynes is a 27 year old  PPD#5 s/p RLTCS here for incision check. Incision reinforced. No signs of infection, active bleeding. Continue to monitor and follow-up if not resolved. Change dressing twice daily. Return precautions discussed. Follow-up at 6 weeks as scheduled.       Sharri Epstein MD,  ИРИНА  [facility]  07/18/23

## 2023-08-04 ENCOUNTER — TELEPHONE (OUTPATIENT)
Dept: OBGYN | Facility: CLINIC | Age: 28
End: 2023-08-04
Payer: COMMERCIAL

## 2023-08-04 NOTE — PROGRESS NOTES
"[facility]  OB/GYN Clinic Note    SUBJECTIVE:                                                   Kendra Haynes is a 27 year old female who presents to clinic today for the following health issue(s):  Patient presents with:  Post Partum Exam: Incision checked,  23    Repeat  on 23.    HPI:  On Friday, developed strong odor from incision last week. Tender to touch. Using hair dryer on incision. Had some redness. Had some yellow stuff come out from incision, on Thursday. Has become red since then. Has some burning. Denies fevers.     Patient's last menstrual period was 10/12/2022..     Today's PHQ-2 Score:       2023     9:40 AM   PHQ-2 (  [employer])   Q1: Little interest or pleasure in doing things 0   Q2: Feeling down, depressed or hopeless 0   PHQ-2 Score 0   Q1: Little interest or pleasure in doing things Not at all   Q2: Feeling down, depressed or hopeless Not at all   PHQ-2 Score 0         OBJECTIVE:     /72   Ht 1.626 m (5' 4\")   Wt 103.9 kg (229 lb)   LMP 10/12/2022   Breastfeeding Yes   BMI 39.31 kg/m    Body mass index is 39.31 kg/m .    Exam:  Constitutional:  Appearance: Well nourished, well developed alert, in no acute distress  Gastrointestinal:  Abdominal Examination:    Incision with scattered areas of erythema superior and infeior to incision. No drainage. Suture present at right side of incision, and is trimmed. Abdomen nontender to palpation, tone normal without rigidity or guarding, no masses present, umbilicus without lesions;         ASSESSMENT/PLAN:                                                        ICD-10-CM    1. Postoperative cellulitis of surgical wound  T81.49XA cephALEXin (KEFLEX) 500 MG capsule      2. Problem involving surgical incision  T81.89XA       3. S/P  section  Z98.891           Kendra Haynes is a 27 year old  3 weeks s/p RLTCS. Early signs of cellulitis. Allergy to clindamycin. Rx for Keflex " provided. Return precautions reviewed. Follow-up as scheduled for routine postpartum visit, or earlier if worsening symptoms    Sharri Epstein MD, MHS  [facility] [place]  08/07/23

## 2023-08-04 NOTE — TELEPHONE ENCOUNTER
23: R   Returning pt call, pt noticing strong incision odor.   Incision is very tender to touch, has been showering multiple times a day. Does note that she has a panus, has been using a hair dryer to to dry the area.  Does note some redness around the incision, did have some bloody discharge from the incision last night yet. Nothing noted today and no pus to her knowledge when looking at the incision with a mirror.  Pt does need an appt to be seen. No appts available.    Consulted Dr Epstein  Pt to be double booked on Monday.  Pt to continue to monitor the site over the weekend. No more than 1 shower per day, continue to keep the area clean and dry. Drying with a hairdryer is fine.  No probbing at the area.  Is further drainage occurs can use maxi pad to help absorb the drainage.    If pt develop fever, redness worsens or spreads, purulent drainage, or pain worsens pt should be evaluated in ED, pt can call nurse line at any time.  Pt verbalized understanding, in agreement with plan, and voiced no further questions.  Sariah Singh RN on 2023 at 10:22 AM

## 2023-08-04 NOTE — TELEPHONE ENCOUNTER
Reason for call:  Other   Patient called regarding (reason for call): call back  Additional comments: had her baby on  after her  her lower area has been feeling really tender and she is currently bleeding. Did b notice that she is currently having a strong odor.      Phone number to reach patient:  Cell number on file:    Telephone Information:   Mobile [phone]       Best Time: ASAP **  she is schedule  already  but  would like to come in sooner.    Can we leave a detailed message on this number?  Not Applicable    Travel screening: Not Applicable

## 2023-08-07 ENCOUNTER — OFFICE VISIT (OUTPATIENT)
Dept: OBGYN | Facility: CLINIC | Age: 28
End: 2023-08-07
Payer: COMMERCIAL

## 2023-08-07 VITALS
SYSTOLIC BLOOD PRESSURE: 104 MMHG | DIASTOLIC BLOOD PRESSURE: 72 MMHG | HEIGHT: 64 IN | WEIGHT: 229 LBS | BODY MASS INDEX: 39.09 KG/M2

## 2023-08-07 DIAGNOSIS — T81.89XA PROBLEM INVOLVING SURGICAL INCISION: ICD-10-CM

## 2023-08-07 DIAGNOSIS — T81.49XA POSTOPERATIVE CELLULITIS OF SURGICAL WOUND: Primary | ICD-10-CM

## 2023-08-07 DIAGNOSIS — Z98.891 S/P CESAREAN SECTION: ICD-10-CM

## 2023-08-07 PROCEDURE — 99024 POSTOP FOLLOW-UP VISIT: CPT | Performed by: STUDENT IN AN ORGANIZED HEALTH CARE EDUCATION/TRAINING PROGRAM

## 2023-08-07 RX ORDER — CEPHALEXIN 500 MG/1
500 CAPSULE ORAL 4 TIMES DAILY
Qty: 28 CAPSULE | Refills: 0 | Status: SHIPPED | OUTPATIENT
Start: 2023-08-07 | End: 2023-08-14

## 2023-08-08 NOTE — PROGRESS NOTES
[facility]  OB/GYN Clinic Note    SUBJECTIVE:  Kendra Haynes,  is here for a postpartum visit.  She had a  Section  on 23 delivering a healthy baby boy, named Demetrio weighing 8 lbs 14.9 oz at term.      HPI:  Seen for serial incision check postpartum, treated for cellulitis . Improved but still has odor, discoloration. Sticky feeling in incision. Some bleeding two days ago. Not sleeping due to baby spitting up at night. Does not have great support at home. Mood is overall okay.       Last PHQ-9 score on record=       2023     8:45 AM   PHQ-9 SCORE   PHQ-9 Total Score 2         2022    12:58 PM 2022     1:13 PM 2023     8:45 AM   KRYS-7 SCORE   Total Score 0 (minimal anxiety) 0 (minimal anxiety)    Total Score 0 0 12       Pap:   Lab Results   Component Value Date    GYNINTERP  2023     Negative for Intraepithelial Lesion or Malignancy (NILM)    PAP NIL 2018       Delivery complications:  No  Breast feeding:  Yes,   Bladder problems:  No  Bowel problems/hemorrhoids:  No  Episiotomy/laceration/incision healed? Yes: still has odor, dark on one side  Vaginal flow:  None  Philip:  No  Contraception: oral contraceptives  Emotional adjustment:  doing well, happy, and tired  Back to work:  October 10    12 point review of systems negative other than symptoms noted below or in the HPI.    OBJECTIVE:  Vitals: /72   Wt 103.7 kg (228 lb 9.6 oz)   LMP 10/12/2022   Breastfeeding Yes   BMI 39.24 kg/m    BMI= Body mass index is 39.24 kg/m .  General - pleasant female in no acute distress.  Abdomen -  Incision healing, with no areas of drainage, erythema.       ASSESSMENT:    ICD-10-CM    1. Routine postpartum follow-up  Z39.2       2. S/P   Z98.891       3. Family planning education, guidance, and counseling  Z30.       4. General counseling and advice on contraceptive management  Z.       5. OCP (oral contraceptive  pills) initiation  Z30.011 norgestimate-ethinyl estradiol (ORTHO-CYCLEN) 0.25-35 MG-MCG tablet          PLAN:  May resume normal activities without restrictions. Incision is now healed. Likely delayed in healing due to infection. Return precautions reviewed.   Pap smear was not done today.  Full counseling was provided, and all questions were answered.   Is considering pregnancy again. Discussed need for RCS for future pregnancies due to CS x 2. Recommend spacing 12-18months.  Contraecption: OCP. Rx provided. No contraindications to use.     Sharri Epstein MD, MHS  08/25/23

## 2023-08-25 ENCOUNTER — PRENATAL OFFICE VISIT (OUTPATIENT)
Dept: OBGYN | Facility: CLINIC | Age: 28
End: 2023-08-25
Payer: COMMERCIAL

## 2023-08-25 VITALS — SYSTOLIC BLOOD PRESSURE: 118 MMHG | DIASTOLIC BLOOD PRESSURE: 72 MMHG | BODY MASS INDEX: 39.24 KG/M2 | WEIGHT: 228.6 LBS

## 2023-08-25 DIAGNOSIS — Z30.09 GENERAL COUNSELING AND ADVICE ON CONTRACEPTIVE MANAGEMENT: ICD-10-CM

## 2023-08-25 DIAGNOSIS — Z30.011 OCP (ORAL CONTRACEPTIVE PILLS) INITIATION: ICD-10-CM

## 2023-08-25 DIAGNOSIS — Z30.09 FAMILY PLANNING EDUCATION, GUIDANCE, AND COUNSELING: ICD-10-CM

## 2023-08-25 DIAGNOSIS — Z98.891 S/P C-SECTION: ICD-10-CM

## 2023-08-25 PROBLEM — O09.90 SUPERVISION OF HIGH-RISK PREGNANCY: Status: RESOLVED | Noted: 2022-12-07 | Resolved: 2023-08-25

## 2023-08-25 PROCEDURE — 99207 PR POST PARTUM EXAM: CPT | Performed by: STUDENT IN AN ORGANIZED HEALTH CARE EDUCATION/TRAINING PROGRAM

## 2023-08-25 RX ORDER — NORGESTIMATE AND ETHINYL ESTRADIOL 0.25-0.035
1 KIT ORAL DAILY
Qty: 84 TABLET | Refills: 3 | Status: SHIPPED | OUTPATIENT
Start: 2023-08-25 | End: 2024-01-18

## 2023-08-25 ASSESSMENT — ANXIETY QUESTIONNAIRES
GAD7 TOTAL SCORE: 12
6. BECOMING EASILY ANNOYED OR IRRITABLE: NEARLY EVERY DAY
1. FEELING NERVOUS, ANXIOUS, OR ON EDGE: MORE THAN HALF THE DAYS
3. WORRYING TOO MUCH ABOUT DIFFERENT THINGS: SEVERAL DAYS
2. NOT BEING ABLE TO STOP OR CONTROL WORRYING: MORE THAN HALF THE DAYS
5. BEING SO RESTLESS THAT IT IS HARD TO SIT STILL: SEVERAL DAYS
IF YOU CHECKED OFF ANY PROBLEMS ON THIS QUESTIONNAIRE, HOW DIFFICULT HAVE THESE PROBLEMS MADE IT FOR YOU TO DO YOUR WORK, TAKE CARE OF THINGS AT HOME, OR GET ALONG WITH OTHER PEOPLE: VERY DIFFICULT
GAD7 TOTAL SCORE: 12
7. FEELING AFRAID AS IF SOMETHING AWFUL MIGHT HAPPEN: MORE THAN HALF THE DAYS

## 2023-08-25 ASSESSMENT — PATIENT HEALTH QUESTIONNAIRE - PHQ9
5. POOR APPETITE OR OVEREATING: SEVERAL DAYS
SUM OF ALL RESPONSES TO PHQ QUESTIONS 1-9: 2

## 2024-01-18 ENCOUNTER — OFFICE VISIT (OUTPATIENT)
Dept: OBGYN | Facility: CLINIC | Age: 29
End: 2024-01-18
Payer: COMMERCIAL

## 2024-01-18 VITALS
HEIGHT: 64 IN | DIASTOLIC BLOOD PRESSURE: 76 MMHG | SYSTOLIC BLOOD PRESSURE: 110 MMHG | WEIGHT: 224 LBS | BODY MASS INDEX: 38.24 KG/M2

## 2024-01-18 DIAGNOSIS — N89.8 ITCHING OF VAGINA: Primary | ICD-10-CM

## 2024-01-18 PROBLEM — Z36.89 ENCOUNTER FOR TRIAGE IN PREGNANT PATIENT: Status: RESOLVED | Noted: 2023-06-28 | Resolved: 2024-01-18

## 2024-01-18 LAB
BACTERIAL VAGINOSIS VAG-IMP: NEGATIVE
CANDIDA DNA VAG QL NAA+PROBE: DETECTED
CANDIDA GLABRATA / CANDIDA KRUSEI DNA: NOT DETECTED
CLUE CELLS: ABNORMAL
T VAGINALIS DNA VAG QL NAA+PROBE: NOT DETECTED
TRICHOMONAS, WET PREP: ABNORMAL
WBC'S/HIGH POWER FIELD, WET PREP: ABNORMAL
YEAST, WET PREP: ABNORMAL

## 2024-01-18 PROCEDURE — 99213 OFFICE O/P EST LOW 20 MIN: CPT | Performed by: NURSE PRACTITIONER

## 2024-01-18 PROCEDURE — 87210 SMEAR WET MOUNT SALINE/INK: CPT | Performed by: NURSE PRACTITIONER

## 2024-01-18 PROCEDURE — 0352U MULTIPLEX VAGINAL PANEL BY PCR: CPT | Performed by: NURSE PRACTITIONER

## 2024-01-18 RX ORDER — FLUCONAZOLE 150 MG/1
150 TABLET ORAL
Qty: 4 TABLET | Refills: 0 | Status: SHIPPED | OUTPATIENT
Start: 2024-01-18 | End: 2024-02-21

## 2024-01-18 ASSESSMENT — ANXIETY QUESTIONNAIRES
GAD7 TOTAL SCORE: 20
2. NOT BEING ABLE TO STOP OR CONTROL WORRYING: NEARLY EVERY DAY
IF YOU CHECKED OFF ANY PROBLEMS ON THIS QUESTIONNAIRE, HOW DIFFICULT HAVE THESE PROBLEMS MADE IT FOR YOU TO DO YOUR WORK, TAKE CARE OF THINGS AT HOME, OR GET ALONG WITH OTHER PEOPLE: VERY DIFFICULT
5. BEING SO RESTLESS THAT IT IS HARD TO SIT STILL: MORE THAN HALF THE DAYS
3. WORRYING TOO MUCH ABOUT DIFFERENT THINGS: NEARLY EVERY DAY
1. FEELING NERVOUS, ANXIOUS, OR ON EDGE: NEARLY EVERY DAY
GAD7 TOTAL SCORE: 20
7. FEELING AFRAID AS IF SOMETHING AWFUL MIGHT HAPPEN: NEARLY EVERY DAY
6. BECOMING EASILY ANNOYED OR IRRITABLE: NEARLY EVERY DAY

## 2024-01-18 ASSESSMENT — PATIENT HEALTH QUESTIONNAIRE - PHQ9
SUM OF ALL RESPONSES TO PHQ QUESTIONS 1-9: 4
5. POOR APPETITE OR OVEREATING: NEARLY EVERY DAY

## 2024-01-18 NOTE — PROGRESS NOTES
SUBJECTIVE:                                                   Kendra Haynes is a 28 year old female who presents to clinic today for the following health issue(s):  Patient presents with:  Vaginal Problem      HPI:  Patient here today with 3 weeks worth of vaginal itching and tearing.  She is sexually active and has discomfort during intercourse.  She has tried to self treat with Monistat with no relief.    She is on Micronor.  She is nursing.    No LMP recorded. (Menstrual status: Postpartum)..     Patient is sexually active, .  Using oral contraceptives for contraception.    reports that she has never smoked. She has never used smokeless tobacco.    STD testing offered?  Declined    Health maintenance updated:  yes    Today's PHQ-2 Score:       2024     2:47 PM   PHQ-2 (  [employer])   Q1: Little interest or pleasure in doing things 1   Q2: Feeling down, depressed or hopeless 0   PHQ-2 Score 1     Today's PHQ-9 Score:       2024     2:47 PM   PHQ-9 SCORE   PHQ-9 Total Score 4     Today's KRYS-7 Score:       2024     2:47 PM   KRYS-7 SCORE   Total Score 20       Problem list and histories reviewed & adjusted, as indicated.  Additional history: as documented.    Patient Active Problem List   Diagnosis    BMI 35.0-35.9,adult    Back pain affecting pregnancy     Past Surgical History:   Procedure Laterality Date     SECTION Bilateral 2021    Procedure:  SECTION;  Surgeon: Patricia Hoskins MD;  Location:  [facility]     SECTION N/A 2023    Procedure: Repeat  Section;  Surgeon: Sharri Epstein MD;  Location:  [facility]    TONSILLECTOMY & ADENOIDECTOMY Bilateral 1998      Social History     Tobacco Use    Smoking status: Never    Smokeless tobacco: Never   Substance Use Topics    Alcohol use: Not Currently     Comment: 0-1 drink every 3-4 months      Problem (# of Occurrences) Relation (Name,Age of Onset)    Cancer (1) Paternal Grandmother     "Diabetes (1) Paternal Grandfather    Hypertension (1) Father    Coronary Artery Disease Early Onset (2) Father, Paternal Grandfather              Current Outpatient Medications   Medication Sig    fluconazole (DIFLUCAN) 150 MG tablet Take 1 tablet (150 mg) by mouth every 3 days    norethindrone (MICRONOR) 0.35 MG tablet Take 1 tablet (0.35 mg) by mouth daily     No current facility-administered medications for this visit.     Allergies   Allergen Reactions    Clindamycin Hives       ROS:  12 point review of systems negative other than symptoms noted below or in the HPI.  No urinary frequency or dysuria, bladder or kidney problems, POSITIVE for:, vaginal discharge, vaginal itching or burning      OBJECTIVE:     /76   Ht 1.626 m (5' 4\")   Wt 101.6 kg (224 lb)   Breastfeeding Yes   BMI 38.45 kg/m    Body mass index is 38.45 kg/m .    Exam:  Constitutional:  Appearance: Well nourished, well developed alert, in no acute distress  Psychiatric:  Mentation appears normal and affect normal/bright.  Pelvic Exam:  External Genitalia:     Normal appearance for age, thin white discharge present, no tenderness present, no inflammatory lesions present, erythematous with a linear skin tear by the clitoral whitlock as well as on the left labial fold  Vagina:     Normal vaginal vault without central or paravaginal defects, creamy white discharge present, no inflammatory lesions present, no masses present.  Erythematous  Urethra:   Urethral Meatus:  No erythema or lesions present  Cervix:     Appearance healthy, no lesions present, nontender to palpation, no bleeding present  Perineum:     Perineum within normal limits, no evidence of trauma, no rashes or skin lesions present  Anus:     Anus within normal limits, no hemorrhoids present  Inguinal Lymph Nodes:     No lymphadenopathy present  Pubic Hair:     Normal pubic hair distribution for age  Genitalia and Groin:     No rashes present, no lesions present, no areas of " discoloration, no masses present     In-Clinic Test Results:  Results for orders placed or performed in visit on 01/18/24 (from the past 24 hour(s))   Wet prep - Clinic Collect    Specimen: Vagina; Swab   Result Value Ref Range    Trichomonas Absent Absent    Yeast Absent Absent    Clue Cells Absent Absent    WBCs/high power field 1+ (A) None       ASSESSMENT/PLAN:                                                        ICD-10-CM    1. Itching of vagina  N89.8 Multiplex Vaginal Panel by PCR     Wet prep - Clinic Collect     fluconazole (DIFLUCAN) 150 MG tablet          There are no Patient Instructions on file for this visit.    28-year-old female with what appears to be a vaginal and labial yeast infection.  There is also possible BV.  Wet prep: negative. Will treat based on exam.   We have encouraged her to avoid intercourse for at least 1 week to 10 days.  Warm bathtub soaks were discussed with the patient.    ADALID Vargas CNP  [facility] [place]

## 2024-02-21 ENCOUNTER — E-VISIT (OUTPATIENT)
Dept: OBGYN | Facility: CLINIC | Age: 29
End: 2024-02-21
Payer: COMMERCIAL

## 2024-02-21 DIAGNOSIS — B37.31 CANDIDAL VULVOVAGINITIS: Primary | ICD-10-CM

## 2024-02-21 DIAGNOSIS — N89.8 ITCHING OF VAGINA: ICD-10-CM

## 2024-02-21 PROCEDURE — 99421 OL DIG E/M SVC 5-10 MIN: CPT | Performed by: NURSE PRACTITIONER

## 2024-02-21 RX ORDER — FLUCONAZOLE 150 MG/1
150 TABLET ORAL
Qty: 4 TABLET | Refills: 0 | Status: SHIPPED | OUTPATIENT
Start: 2024-02-21 | End: 2024-08-21

## 2024-02-21 RX ORDER — NYSTATIN 100000 U/G
OINTMENT TOPICAL 2 TIMES DAILY
Qty: 30 G | Refills: 0 | Status: SHIPPED | OUTPATIENT
Start: 2024-02-21 | End: 2024-08-21

## 2024-02-21 NOTE — PATIENT INSTRUCTIONS
Thank you for choosing us for your care. I have placed an order for a prescription so that you can start treatment. View your full visit summary for details by clicking on the link below. Your pharmacist will able to address any questions you may have about the medication.     If you re not feeling better within 2-3 days, please schedule an appointment.  You can schedule an appointment right here in [facility], or call [phone]  If the visit is for the same symptoms as your eVisit, we ll refund the cost of your eVisit if seen within seven days.    Vaginal Yeast Infection: Care Instructions  Overview     A vaginal yeast infection is the growth of too many yeast cells in the vagina. This is a common problem. Itching, vaginal discharge and irritation, and other symptoms can bother you. But yeast infections don't often cause other health problems.  Some medicines can increase your risk of getting a yeast infection. These include antibiotics, hormones, and steroids. You may also be more likely to get a yeast infection if you are pregnant, have diabetes, douche, or wear tight clothes.  With treatment, most yeast infections get better in a few days.  Follow-up care is a key part of your treatment and safety. Be sure to make and go to all appointments, and call your doctor if you are having problems. It's also a good idea to know your test results and keep a list of the medicines you take.  How can you care for yourself at home?  Take your medicines exactly as prescribed. Call your doctor if you think you are having a problem with your medicine.  Ask your doctor about over-the-counter (OTC) medicines for yeast infections. If you use an OTC treatment, read and follow all instructions on the label.  Don't use tampons while using a vaginal cream or suppository. The tampons can absorb the medicine. Use pads instead.  Wear loose cotton clothing. Don't wear nylon or other fabric that holds body heat and moisture close to the  "skin.  Try sleeping without underwear.  Don't scratch. Relieve itching with a cold pack or a cool bath.  Don't wash your vulva more than once a day. Use plain water or a mild, unscented soap. Air-dry the vulva.  Change out of wet or damp clothes as soon as possible.  If you are using a vaginal medicine, don't have sex until you have finished your treatment. But if you do have sex, don't depend on a condom or diaphragm for birth control. The oil in some vaginal medicines weakens latex.  Don't douche or use powders, sprays, or perfumes in your vagina or on your vulva. These items can change the normal balance of organisms in your vagina.  When should you call for help?   Call your doctor now or seek immediate medical care if:    You have new or increased pain in your vagina or pelvis.   Watch closely for changes in your health, and be sure to contact your doctor if:    You have unexpected vaginal bleeding.     You have a fever.     You are not getting better after 2 days.     Your symptoms come back after you finish your medicines.   Where can you learn more?  Go to https://www.[employer].net/patiented  Enter F639 in the search box to learn more about \"Vaginal Yeast Infection: Care Instructions.\"  Current as of: April 19, 2023               Content Version: 13.8    6509-3273 [employer].   Care instructions adapted under license by your healthcare professional. If you have questions about a medical condition or this instruction, always ask your healthcare professional. [employer] disclaims any warranty or liability for your use of this information.      "

## 2024-02-25 ENCOUNTER — HEALTH MAINTENANCE LETTER (OUTPATIENT)
Age: 29
End: 2024-02-25

## 2024-05-10 ENCOUNTER — E-VISIT (OUTPATIENT)
Dept: URGENT CARE | Facility: CLINIC | Age: 29
End: 2024-05-10
Payer: COMMERCIAL

## 2024-05-10 DIAGNOSIS — H10.31 ACUTE BACTERIAL CONJUNCTIVITIS OF RIGHT EYE: Primary | ICD-10-CM

## 2024-05-10 PROCEDURE — 99421 OL DIG E/M SVC 5-10 MIN: CPT | Performed by: NURSE PRACTITIONER

## 2024-05-10 RX ORDER — POLYMYXIN B SULFATE AND TRIMETHOPRIM 1; 10000 MG/ML; [USP'U]/ML
SOLUTION OPHTHALMIC
Qty: 10 ML | Refills: 0 | Status: SHIPPED | OUTPATIENT
Start: 2024-05-10 | End: 2024-05-17

## 2024-05-10 NOTE — PATIENT INSTRUCTIONS
Thank you for choosing us for your care. I have placed an order for a prescription so that you can start treatment. View your full visit summary for details by clicking on the link below. Your pharmacist will able to address any questions you may have about the medication.     If you re not feeling better within 2-3 days, please schedule an appointment.  You can schedule an appointment right here in [facility], or call [phone]  If the visit is for the same symptoms as your eVisit, we ll refund the cost of your eVisit if seen within seven days.    Pinkeye: Care Instructions  Overview     Pinkeye is redness and swelling of the eye surface and the conjunctiva (the lining of the eyelid and the covering of the white part of the eye). Pinkeye is also called conjunctivitis. Pinkeye is often caused by infection with bacteria or a virus. Dry air, allergies, smoke, and chemicals are other common causes.  Pinkeye often gets better on its own in 7 to 10 days. Antibiotics only help if the pinkeye is caused by bacteria. Pinkeye caused by infection spreads easily. If an allergy or chemical is causing pinkeye, it will not go away unless you can avoid whatever is causing it.  Follow-up care is a key part of your treatment and safety. Be sure to make and go to all appointments, and call your doctor if you are having problems. It's also a good idea to know your test results and keep a list of the medicines you take.  How can you care for yourself at home?  Wash your hands often. Always wash them before and after you treat pinkeye or touch your eyes or face.  Use moist cotton or a clean, wet cloth to remove crust. Wipe from the inside corner of the eye to the outside. Use a clean part of the cloth for each wipe.  Put cold or warm wet cloths on your eye a few times a day if the eye hurts.  Do not wear contact lenses or eye makeup until the pinkeye is gone. Throw away any eye makeup you were using when you got pinkeye. Clean your  "contacts and storage case. If you wear disposable contacts, use a new pair when your eye has cleared and it is safe to wear contacts again.  If the doctor gave you antibiotic ointment or eyedrops, use them as directed. Use the medicine for as long as instructed, even if your eye starts looking better soon. Keep the bottle tip clean, and do not let it touch the eye area.  To put in eyedrops or ointment:  Tilt your head back, and pull your lower eyelid down with one finger.  Drop or squirt the medicine inside the lower lid.  Close your eye for 30 to 60 seconds to let the drops or ointment move around.  Do not touch the ointment or dropper tip to your eyelashes or any other surface.  Do not share towels, pillows, or washcloths while you have pinkeye.  When should you call for help?   Call your doctor now or seek immediate medical care if:    You have pain in your eye, not just irritation on the surface.     You have a change in vision or loss of vision.     You have an increase in discharge from the eye.     Your eye has not started to improve or begins to get worse within 48 hours after you start using antibiotics.     Pinkeye lasts longer than 7 days.   Watch closely for changes in your health, and be sure to contact your doctor if you have any problems.  Where can you learn more?  Go to https://www.[employer].net/patiented  Enter Y392 in the search box to learn more about \"Pinkeye: Care Instructions.\"  Current as of: June 5, 2023               Content Version: 14.0    8852-4172 [employer].   Care instructions adapted under license by your healthcare professional. If you have questions about a medical condition or this instruction, always ask your healthcare professional. [employer] disclaims any warranty or liability for your use of this information.      "

## 2024-08-06 ENCOUNTER — E-VISIT (OUTPATIENT)
Dept: URGENT CARE | Facility: CLINIC | Age: 29
End: 2024-08-06
Payer: COMMERCIAL

## 2024-08-06 DIAGNOSIS — R21 RASH AND NONSPECIFIC SKIN ERUPTION: Primary | ICD-10-CM

## 2024-08-06 PROCEDURE — 99421 OL DIG E/M SVC 5-10 MIN: CPT | Performed by: EMERGENCY MEDICINE

## 2024-08-06 RX ORDER — BETAMETHASONE DIPROPIONATE 0.5 MG/G
CREAM TOPICAL 2 TIMES DAILY
Qty: 45 G | Refills: 1 | Status: SHIPPED | OUTPATIENT
Start: 2024-08-06 | End: 2024-08-16

## 2024-08-07 DIAGNOSIS — Z30.09 GENERAL COUNSELING AND ADVICE ON CONTRACEPTIVE MANAGEMENT: ICD-10-CM

## 2024-08-07 NOTE — TELEPHONE ENCOUNTER
Requested Prescriptions   Pending Prescriptions Disp Refills    norethindrone (MICRONOR) 0.35 MG tablet 84 tablet 3     Sig: Take 1 tablet (0.35 mg) by mouth daily       Contraceptives Protocol Failed - 8/7/2024  9:55 AM        Failed - Medication indicated for associated diagnosis     Medication is associated with one or more of the following diagnoses:  Contraception  Acne  Dysmenorrhea  Menorrhagia  Amenorrhea  PCOS  Premenstrual Dysphoric Disorder  Irregular menses  Endometriosis          Passed - Patient is not a current smoker if age is 35 or older        Passed - Recent (12 mo) or future (30 days) visit within the authorizing provider's specialty     The patient must have completed an in-person or virtual visit within the past 12 months or has a future visit scheduled within the next 90 days with the authorizing provider s specialty.  Urgent care and e-visits do not quality as an office visit for this protocol.          Passed - Medication is active on med list        Passed - No active pregnancy on record        Passed - No positive pregnancy test in past 12 months          Last Written Prescription Date:  09/06/2023  Last Fill Quantity: 84,  # refills: 3   Last office visit: 1/18/2024 with prescribing provider:  Dr. Epstein   Future Office Visit:  08/21/2024

## 2024-08-08 RX ORDER — ACETAMINOPHEN AND CODEINE PHOSPHATE 120; 12 MG/5ML; MG/5ML
0.35 SOLUTION ORAL DAILY
Qty: 28 TABLET | Refills: 0 | Status: SHIPPED | OUTPATIENT
Start: 2024-08-08 | End: 2024-08-21

## 2024-08-08 NOTE — TELEPHONE ENCOUNTER
Medication is being filled for 1 time refill only due to:  Patient needs to be seen because due for annual.  Appointment scheduled.  Soraya Quintanilla RN on 8/8/2024 at 6:55 AM

## 2024-08-21 ENCOUNTER — OFFICE VISIT (OUTPATIENT)
Dept: OBGYN | Facility: CLINIC | Age: 29
End: 2024-08-21
Payer: COMMERCIAL

## 2024-08-21 VITALS
WEIGHT: 229.6 LBS | BODY MASS INDEX: 38.25 KG/M2 | HEIGHT: 65 IN | DIASTOLIC BLOOD PRESSURE: 72 MMHG | SYSTOLIC BLOOD PRESSURE: 118 MMHG

## 2024-08-21 DIAGNOSIS — Z01.411 ENCOUNTER FOR GYNECOLOGICAL EXAMINATION WITH ABNORMAL FINDING: Primary | ICD-10-CM

## 2024-08-21 DIAGNOSIS — N76.2 ACUTE VULVITIS: ICD-10-CM

## 2024-08-21 DIAGNOSIS — N89.8 ITCHING OF VAGINA: ICD-10-CM

## 2024-08-21 DIAGNOSIS — Z30.41 ENCOUNTER FOR SURVEILLANCE OF CONTRACEPTIVE PILLS: ICD-10-CM

## 2024-08-21 PROBLEM — O99.891 BACK PAIN AFFECTING PREGNANCY: Status: RESOLVED | Noted: 2023-06-28 | Resolved: 2024-08-21

## 2024-08-21 PROBLEM — M54.9 BACK PAIN AFFECTING PREGNANCY: Status: RESOLVED | Noted: 2023-06-28 | Resolved: 2024-08-21

## 2024-08-21 LAB
BACTERIAL VAGINOSIS VAG-IMP: NEGATIVE
CANDIDA DNA VAG QL NAA+PROBE: DETECTED
CANDIDA GLABRATA / CANDIDA KRUSEI DNA: NOT DETECTED
T VAGINALIS DNA VAG QL NAA+PROBE: NOT DETECTED

## 2024-08-21 PROCEDURE — 99395 PREV VISIT EST AGE 18-39: CPT | Performed by: NURSE PRACTITIONER

## 2024-08-21 PROCEDURE — 0352U MULTIPLEX VAGINAL PANEL BY PCR: CPT | Performed by: NURSE PRACTITIONER

## 2024-08-21 PROCEDURE — 99213 OFFICE O/P EST LOW 20 MIN: CPT | Mod: 25 | Performed by: NURSE PRACTITIONER

## 2024-08-21 PROCEDURE — 99459 PELVIC EXAMINATION: CPT | Performed by: NURSE PRACTITIONER

## 2024-08-21 RX ORDER — FLUCONAZOLE 150 MG/1
150 TABLET ORAL ONCE
Qty: 1 TABLET | Refills: 0 | Status: SHIPPED | OUTPATIENT
Start: 2024-08-21 | End: 2024-08-21

## 2024-08-21 RX ORDER — NORETHINDRONE ACETATE AND ETHINYL ESTRADIOL 1.5-30(21)
1 KIT ORAL DAILY
Qty: 84 TABLET | Refills: 3 | Status: SHIPPED | OUTPATIENT
Start: 2024-08-21

## 2024-08-21 RX ORDER — NYSTATIN 100000 U/G
OINTMENT TOPICAL 2 TIMES DAILY
Qty: 30 G | Refills: 1 | Status: SHIPPED | OUTPATIENT
Start: 2024-08-21

## 2024-08-21 NOTE — PROGRESS NOTES
Kendra is a 28 year old  female who presents for annual exam.     Besides routine health maintenance, she has no other health concerns today .    HPI:  The patient's PCP is  ADALID Moreira CNP.      Patient here today for her annual GYN exam.  She has noticed more vaginal itching and external genital  skin tears.  She is done breast-feeding and we transitioned her over to a combined contraception pill this spring.  She has had irregular menstrual cycles on this pill as well.  Her last cycle lasted about 16 days.      GYNECOLOGIC HISTORY:    Patient's last menstrual period was 2024 (approximate).    Regular menses? no  Length of menses: 16 days    Her current contraception method is: oral contraceptives.  She  reports that she has never smoked. She has never used smokeless tobacco.    Patient is sexually active.  STD testing offered?  Declined  Last PHQ-9 score on record =       2024     2:47 PM   PHQ-9 SCORE   PHQ-9 Total Score 4     Last GAD7 score on record =       2024     2:47 PM   KRYS-7 SCORE   Total Score 20       HEALTH MAINTENANCE:  Cholesterol: (  Cholesterol   Date Value Ref Range Status   2019 174 <200 mg/dL Final      Pap:   Lab Results   Component Value Date    GYNINTERP  2023     Negative for Intraepithelial Lesion or Malignancy (NILM)    PAP NIL 2018     Health maintenance updated:  yes    Care Gaps    Overdue          Never done ADVANCE CARE PLANNING (Every 5 Years)     2020 YEARLY PREVENTIVE VISIT (Yearly)  Last completed: 2019         Upcoming          SEP 1  2024 INFLUENZA VACCINE (1)  Last completed: Sep 15, 2020     HOLLIE 1  2025 COVID-19 Vaccine ( season)     Postponed by Sita Wilburn (Patient Declined)     2026 PAP (Every 3 Years)  Last completed:  DTAP/TDAP/TD IMMUNIZATION (9 - Td or Tdap)  Last completed: 2023       HISTORY:  OB History    Para Term  AB  Living   2 2 2 0 0 2   SAB IAB Ectopic Multiple Live Births   0 0 0 0 2      # Outcome Date GA Lbr Jakub/2nd Weight Sex Type Anes PTL Lv   2 Term 22 39w1d  4.05 kg (8 lb 14.9 oz) M CS-LTranv Spinal N ELVA      Name: Mo      Apgar1: 8  Apgar5: 9   1 Term 21 40w1d 08:23 / 04:59 3.71 kg (8 lb 2.9 oz) F CS-LTranv EPI N ELVA      Complications: Cephalopelvic Disproportion, Failure to Progress in Second Stage      Name: Jennifer      Apgar1: 9  Apgar5: 9       Patient Active Problem List   Diagnosis    BMI 35.0-35.9,adult     Past Surgical History:   Procedure Laterality Date     SECTION Bilateral 2021    Procedure:  SECTION;  Surgeon: Patricia Hoskins MD;  Location:  L[facility]D     SECTION N/A 2023    Procedure: Repeat  Section;  Surgeon: Sharri Epstein MD;  Location:  L[facility]D    TONSILLECTOMY & ADENOIDECTOMY Bilateral 1998      Social History     Tobacco Use    Smoking status: Never    Smokeless tobacco: Never   Substance Use Topics    Alcohol use: Not Currently     Comment: 0-1 drink every 3-4 months      Problem (# of Occurrences) Relation (Name,Age of Onset)    Cancer (1) Paternal Grandmother    Diabetes (1) Paternal Grandfather    Hypertension (1) Father    Coronary Artery Disease Early Onset (2) Father, Paternal Grandfather              Current Outpatient Medications   Medication Sig Dispense Refill    fluconazole (DIFLUCAN) 150 MG tablet Take 1 tablet (150 mg) by mouth once for 1 dose. 1 tablet 0    norethindrone-ethinyl estradiol-iron (MICROGESTIN FE1.5/30) 1.5-30 MG-MCG tablet Take 1 tablet by mouth daily. 84 tablet 3    nystatin (MYCOSTATIN) 090914 UNIT/GM external ointment Apply topically 2 times daily. 30 g 1     No current facility-administered medications for this visit.     Allergies   Allergen Reactions    Clindamycin Hives       Past medical, surgical, social and family histories were reviewed and updated in EPIC.    ROS:   12 point review of  "systems negative other than symptoms noted below or in the HPI.  Genitourinary: Irregular Menses, Vaginal Discharge, and Vaginal Itching  No urinary frequency or dysuria, bladder or kidney problems, POSITIVE for:, irregular menses, vaginal discharge    EXAM:  /72   Ht 1.638 m (5' 4.5\")   Wt 104.1 kg (229 lb 9.6 oz)   LMP 07/25/2024 (Approximate)   Breastfeeding No   BMI 38.80 kg/m     BMI: Body mass index is 38.8 kg/m .    PHYSICAL EXAM:  Constitutional:   Appearance: Well nourished, well developed, alert, in no acute distress  Neck:  Lymph Nodes:  No lymphadenopathy present    Thyroid:  Gland size normal, nontender, no nodules or masses present  on palpation  Chest:  Respiratory Effort:  Breathing unlabored  Cardiovascular:    Heart: Auscultation:  Regular rate, normal rhythm, no murmurs present  Breasts: Inspection of Breasts:  No lymphadenopathy present., Palpation of Breasts and Axillae:  No masses present on palpation, no breast tenderness., Axillary Lymph Nodes:  No lymphadenopathy present., and No nodularity, asymmetry or nipple discharge bilaterally.  Gastrointestinal:   Abdominal Examination:  Abdomen nontender to palpation, tone normal without rigidity or guarding, no masses present, umbilicus without lesions   Liver and Spleen:  No hepatomegaly present, liver nontender to palpation    Hernias:  No hernias present  Lymphatic: Lymph Nodes:  No other lymphadenopathy present  Skin:  General Inspection:  No rashes present, no lesions present, no areas of  discoloration  Neurologic:    Mental Status:  Oriented X3.  Normal strength and tone, sensory exam                grossly normal, mentation intact and speech normal.    Psychiatric:   Mentation appears normal and affect normal/bright.         Pelvic Exam:  External Genitalia:     Normal appearance for age, no discharge present, no tenderness present, no inflammatory lesions present, color normal.  She has a linear skin tear by the clitoris and a " skin tear in the labial fold on the left.  She has loss of labial architecture on the right.  No hypopigmentation noted.  Vagina:     Normal vaginal vault without central or paravaginal defects,  frothy white discharge present, no inflammatory lesions present, no masses present  Bladder:     Nontender to palpation  Urethra:   Urethral Body:  Urethra palpation normal, urethra structural support normal   Urethral Meatus:  No erythema or lesions present  Cervix:     Appearance healthy, no lesions present, nontender to palpation, no bleeding present  Uterus:     Uterus: firm, normal sized and nontender, anteverted in position.   Adnexa:     No adnexal tenderness present, no adnexal masses present  Perineum:     Perineum within normal limits, no evidence of trauma, no rashes or skin lesions present  Anus:     Anus within normal limits, no hemorrhoids present  Inguinal Lymph Nodes:     No lymphadenopathy present  Pubic Hair:     Normal pubic hair distribution for age  Genitalia and Groin:     No rashes present, no lesions present, no areas of discoloration, no masses present    COUNSELING:   Special attention given to:        Regular exercise       Healthy diet/nutrition       Contraception    BMI: Body mass index is 38.8 kg/m .  Weight management plan: Discussed healthy diet and exercise guidelines    ASSESSMENT:  28 year old female with satisfactory annual exam.    ICD-10-CM    1. Encounter for gynecological examination with abnormal finding  Z01.411 AK PELVIC EXAMINATION      2. Itching of vagina  N89.8 fluconazole (DIFLUCAN) 150 MG tablet     Multiplex Vaginal Panel by PCR      3. Acute vulvitis  N76.2 nystatin (MYCOSTATIN) 238135 UNIT/GM external ointment      4. Encounter for surveillance of contraceptive pills  Z30.41 norethindrone-ethinyl estradiol-iron (MICROGESTIN FE1.5/30) 1.5-30 MG-MCG tablet          PLAN:  28-year-old female with a presumed yeast infection on exam.  We will send treatment.  We do long  discussion about preventatives.  I question whether she has some lack of estrogen on the external genitalia versus lichen sclerosis.  We we will test her for yeast and bacteria via a vaginal culture.  If her culture is negative we will cover her with vaginal estradiol cream.  We also changed her birth control pill today.    ADALID Vargas CNP

## 2024-08-22 RX ORDER — FLUCONAZOLE 150 MG/1
150 TABLET ORAL
Qty: 2 TABLET | Refills: 0 | Status: SHIPPED | OUTPATIENT
Start: 2024-08-22 | End: 2024-08-29

## 2024-08-29 ENCOUNTER — OFFICE VISIT (OUTPATIENT)
Dept: OBGYN | Facility: CLINIC | Age: 29
End: 2024-08-29
Payer: COMMERCIAL

## 2024-08-29 VITALS — HEIGHT: 65 IN | DIASTOLIC BLOOD PRESSURE: 70 MMHG | SYSTOLIC BLOOD PRESSURE: 112 MMHG | BODY MASS INDEX: 38.8 KG/M2

## 2024-08-29 DIAGNOSIS — N89.8 ITCHING OF VAGINA: Primary | ICD-10-CM

## 2024-08-29 LAB
BACTERIAL VAGINOSIS VAG-IMP: NEGATIVE
CANDIDA DNA VAG QL NAA+PROBE: NOT DETECTED
CANDIDA GLABRATA / CANDIDA KRUSEI DNA: NOT DETECTED
T VAGINALIS DNA VAG QL NAA+PROBE: NOT DETECTED

## 2024-08-29 PROCEDURE — 99213 OFFICE O/P EST LOW 20 MIN: CPT | Performed by: NURSE PRACTITIONER

## 2024-08-29 PROCEDURE — 0352U MULTIPLEX VAGINAL PANEL BY PCR: CPT | Performed by: NURSE PRACTITIONER

## 2024-08-29 PROCEDURE — 87102 FUNGUS ISOLATION CULTURE: CPT | Performed by: NURSE PRACTITIONER

## 2024-08-29 PROCEDURE — 99459 PELVIC EXAMINATION: CPT | Performed by: NURSE PRACTITIONER

## 2024-08-29 NOTE — PROGRESS NOTES
SUBJECTIVE:                                                   Kendra Haynes is a 28 year old female who presents to clinic today for the following health issue(s):  Patient presents with:  Follow Up: Patient still c/o vaginal itching after treatment.      HPI:  Pt here today for a DASHA for yeast infection.   She was treated and continues to use topical nystatin PRN.    She still has some creamy discharge, but no internal itching.     Patient's last menstrual period was 2024 (approximate)..   Patient is sexually active, .  Using oral contraceptives for contraception.    reports that she has never smoked. She has never used smokeless tobacco.  STD testing offered?  Declined  Health maintenance reviewed.     Today's PHQ-2 Score:       2024     2:47 PM   PHQ-2 (  [employer])   Q1: Little interest or pleasure in doing things 1   Q2: Feeling down, depressed or hopeless 0   PHQ-2 Score 1     Today's PHQ-9 Score:       2024     2:47 PM   PHQ-9 SCORE   PHQ-9 Total Score 4     Today's KRYS-7 Score:       2024     2:47 PM   KRYS-7 SCORE   Total Score 20       Problem list and histories reviewed & adjusted, as indicated.  Additional history: as documented.    Patient Active Problem List   Diagnosis    BMI 35.0-35.9,adult     Past Surgical History:   Procedure Laterality Date     SECTION Bilateral 2021    Procedure:  SECTION;  Surgeon: Patricia Hoskins MD;  Location:  L[facility]D     SECTION N/A 2023    Procedure: Repeat  Section;  Surgeon: Sharri Epstein MD;  Location:  L[facility]D    TONSILLECTOMY & ADENOIDECTOMY Bilateral 1998      Social History     Tobacco Use    Smoking status: Never    Smokeless tobacco: Never   Substance Use Topics    Alcohol use: Not Currently     Comment: 0-1 drink every 3-4 months      Problem (# of Occurrences) Relation (Name,Age of Onset)    Cancer (1) Paternal Grandmother    Diabetes (1) Paternal Grandfather     "Hypertension (1) Father    Coronary Artery Disease Early Onset (2) Father, Paternal Grandfather              Current Outpatient Medications   Medication Sig Dispense Refill    norethindrone-ethinyl estradiol-iron (MICROGESTIN FE1.5/30) 1.5-30 MG-MCG tablet Take 1 tablet by mouth daily. 84 tablet 3    nystatin (MYCOSTATIN) 422133 UNIT/GM external ointment Apply topically 2 times daily. 30 g 1     No current facility-administered medications for this visit.     Allergies   Allergen Reactions    Clindamycin Hives       ROS:    No urinary frequency or dysuria, bladder or kidney problems      OBJECTIVE:     /70   Ht 1.638 m (5' 4.5\")   LMP 07/25/2024 (Approximate)   BMI 38.80 kg/m    Body mass index is 38.8 kg/m .    Exam:  Constitutional:  Appearance: Well nourished, well developed alert, in no acute distress  Neurologic:  Mental Status:  Oriented X3.  Normal strength and tone, sensory exam grossly normal, mentation intact and speech normal.    Psychiatric:  Mentation appears normal and affect normal/bright.  Pelvic Exam:  External Genitalia:     Normal appearance for age, no discharge present, no tenderness present, no inflammatory lesions present, color normal  Vagina:     Normal vaginal vault without central or paravaginal defects, no discharge present, no inflammatory lesions present, no masses present  Urethra:   Urethral Meatus:  No erythema or lesions present  Cervix:     Appearance healthy, no lesions present, nontender to palpation, no bleeding present  Perineum:     Perineum within normal limits, no evidence of trauma, no rashes or skin lesions present  Anus:     Anus within normal limits, no hemorrhoids present  Inguinal Lymph Nodes:     No lymphadenopathy present  Pubic Hair:     Normal pubic hair distribution for age  Genitalia and Groin:     No rashes present, no lesions present, no areas of discoloration, no masses present     In-Clinic Test Results:  No results found for this or any previous " visit (from the past 24 hour(s)).    ASSESSMENT/PLAN:                                                        ICD-10-CM    1. Itching of vagina  N89.8 Multiplex Vaginal Panel by PCR     Yeast culture     TX PELVIC EXAMINATION          There are no Patient Instructions on file for this visit.    DASHA culture and yeast culture sent.   Will treat if needed.     ADALID Vargas [facility] FOR [facility]

## 2024-09-02 LAB — BACTERIA VAG AEROBE CULT: NO GROWTH

## 2024-10-10 ENCOUNTER — TELEPHONE (OUTPATIENT)
Dept: OBGYN | Facility: CLINIC | Age: 29
End: 2024-10-10
Payer: COMMERCIAL

## 2024-10-10 NOTE — LETTER
10/22/2024     Kendra Haynes  [address] [place] [address]        Kendra Haynes iis a patient of mine. She has been battling chronic vulvovaginal candidiasis for months.     It is medically necessary for her to have an extended course of Diflucan to effectively treat/eradicate the candidal infection.     She has tried many rounds of the traditional method/treatment courses.     Cordially,    ADALID Vargas CNP

## 2024-10-10 NOTE — TELEPHONE ENCOUNTER
Prior Authorization Retail Medication Request    Medication/Dose: fluconazole (DIFLUCAN) 150 MG tablet  Diagnosis and ICD code (if different than what is on RX):  chronic vaginitis yeast  New/renewal/insurance change PA/secondary ins. PA:  Previously Tried and Failed:  multiple yeast infections in less than 1 year  Boric acid suppositories  Rationale:  Recurrent symptoms and itching w no relief from tx plan    Insurance   Primary:     [employer] CHOICE     Insurance ID:  [id number]       Pharmacy Information (if different than what is on RX)  Name:    [id number] - [place], [place] - [address] AT [facility] [address]     Phone:  [phone]  Fax:[phone]    Clinic Information  Preferred routing pool for dept communication: WE Prior Auth

## 2024-10-15 NOTE — TELEPHONE ENCOUNTER
Retail Pharmacy Prior Authorization Team   Phone: [phone]    PA Initiation    Medication: FLUCONAZOLE 150 MG PO TABS  Insurance Company: [employer] Non-Specialty PA's - Phone [phone] Fax [phone]  Pharmacy Filling the Rx: [id number] - [place], [place] - [address] [facility] [address]  Filling Pharmacy Phone: [phone]  Filling Pharmacy Fax:    Start Date: 10/15/2024

## 2024-10-16 NOTE — TELEPHONE ENCOUNTER
Retail Pharmacy Prior Authorization Team   Phone: [phone]    PRIOR AUTHORIZATION DENIED    Medication: FLUCONAZOLE 150 MG PO TABS  Insurance Company: [employer] Non-Specialty PA's - Phone [phone] Fax [phone]  Denial Date: 10/15/2024  Denial Reason(s): NOT A COVERED DX FOR REQUESTED QUANTITY    Appeal Information:

## 2024-10-22 NOTE — TELEPHONE ENCOUNTER
Retail Pharmacy Prior Authorization Team   Phone: [phone]    Medication Appeal Initiation    Medication: FLUCONAZOLE 150 MG PO TABS  Appeal Start Date:  10/22/2024  Insurance Company: [employer]  Insurance Phone: [phone]  Insurance Fax: [phone]  Comments:

## 2024-10-23 NOTE — TELEPHONE ENCOUNTER
fluconazole (DIFLUCAN) 150 MG tablet  150 mg, DAILY 0 ordered          Summary: Take 1 tablet (150 mg) by mouth daily. For 30 days then once a week for 5 months          Spoke with pharmacist to clarify appeals for above Rx.  Sariah Singh RN on 10/23/2024 at 4:48 PM   WE [facility]ELIO

## 2024-10-24 ENCOUNTER — MYC MEDICAL ADVICE (OUTPATIENT)
Dept: OBGYN | Facility: CLINIC | Age: 29
End: 2024-10-24
Payer: COMMERCIAL

## 2024-10-24 ENCOUNTER — TELEPHONE (OUTPATIENT)
Dept: OBGYN | Facility: CLINIC | Age: 29
End: 2024-10-24
Payer: COMMERCIAL

## 2024-10-24 DIAGNOSIS — N76.1 CHRONIC VAGINITIS: ICD-10-CM

## 2024-10-24 NOTE — TELEPHONE ENCOUNTER
[facility]    Phone Message    May a detailed message be left on voicemail: yes     Reason for Call: Stefanie from [employer] calling stating that prior auth for pt medication,  fluconazole (DIFLUCAN) 150 MG has been approved by ins company. Case#[id number] and coverage dates are 9/22/2024-10/23/2025. Please advise     Action Taken: Message routed to:  Other: [facility]    Travel Screening: Not Applicable

## 2024-10-25 RX ORDER — FLUCONAZOLE 150 MG/1
150 TABLET ORAL DAILY
Qty: 50 TABLET | Refills: 0 | Status: SHIPPED | OUTPATIENT
Start: 2024-10-25

## 2024-10-30 NOTE — TELEPHONE ENCOUNTER
Routing pt Marcandit message to provider to advise.  Sariah Singh RN on 10/30/2024 at 8:16 AM   [facility]

## 2024-10-31 NOTE — TELEPHONE ENCOUNTER
Notice of Approval received dated 10/23/24.  Request is approved from 09/22/24 - 10/23/2025. Sent to [facility] for scanning.  Leslye Hernandez CMA on 10/31/2024 at 3:24 PM

## 2024-11-04 NOTE — TELEPHONE ENCOUNTER
Routing pt Secrettet message to provider as PRADEEP Washington RN on 11/4/2024 at 9:23 AM  [facility] Triage

## 2025-02-17 ENCOUNTER — OFFICE VISIT (OUTPATIENT)
Dept: OBGYN | Facility: CLINIC | Age: 30
End: 2025-02-17
Payer: COMMERCIAL

## 2025-02-17 VITALS — SYSTOLIC BLOOD PRESSURE: 118 MMHG | WEIGHT: 218 LBS | DIASTOLIC BLOOD PRESSURE: 72 MMHG | BODY MASS INDEX: 36.84 KG/M2

## 2025-02-17 DIAGNOSIS — N76.1 CHRONIC VAGINITIS: Primary | ICD-10-CM

## 2025-02-17 PROCEDURE — 99214 OFFICE O/P EST MOD 30 MIN: CPT | Performed by: NURSE PRACTITIONER

## 2025-02-17 PROCEDURE — 87102 FUNGUS ISOLATION CULTURE: CPT | Performed by: NURSE PRACTITIONER

## 2025-02-17 PROCEDURE — 81515 NFCT DS BV&VAGINITIS DNA ALG: CPT | Performed by: NURSE PRACTITIONER

## 2025-02-17 PROCEDURE — 99459 PELVIC EXAMINATION: CPT | Performed by: NURSE PRACTITIONER

## 2025-02-17 NOTE — PROGRESS NOTES
SUBJECTIVE:                                                   Kendra Haynes is a 29 year old female who presents to clinic today for the following health issue(s):  Patient presents with:  Follow Up      HPI:  Kendra is here today for a follow up on her recurrent yeast infection treatment. She denies any current symptoms of yeast infection: itching, change in discharge and dysuria. She is so happy with treatment. She is currently taking the Diflucan x1 a week and has a few months left of treatment. She will be 1 year post breastfeeding this July and is nervous for symptoms to come back.     Her relationship with her  has been much better since symptoms have been under control. She is sexually active. She is hoping to eventually have another baby but is worried symptoms may come back postpartum.    No LMP recorded..     Patient is sexually active, .  Using oral contraceptives for contraception.    reports that she has never smoked. She has never used smokeless tobacco.    STD testing offered?  Declined    Health maintenance updated:  yes    Today's PHQ-2 Score:       2024     2:47 PM   PHQ-2 (  [employer])   Q1: Little interest or pleasure in doing things 1   Q2: Feeling down, depressed or hopeless 0   PHQ-2 Score 1     Today's PHQ-9 Score:       2024     2:47 PM   PHQ-9 SCORE   PHQ-9 Total Score 4     Today's KRYS-7 Score:       2024     2:47 PM   KRYS-7 SCORE   Total Score 20       Problem list and histories reviewed & adjusted, as indicated.  Additional history: as documented.    Patient Active Problem List   Diagnosis    BMI 35.0-35.9,adult     Past Surgical History:   Procedure Laterality Date     SECTION Bilateral 2021    Procedure:  SECTION;  Surgeon: Patricia Hoskins MD;  Location:  [facility]     SECTION N/A 2023    Procedure: Repeat  Section;  Surgeon: Sharri Epstein MD;  Location:  [facility]    TONSILLECTOMY & ADENOIDECTOMY  Bilateral 01/01/1998      Social History     Tobacco Use    Smoking status: Never    Smokeless tobacco: Never   Substance Use Topics    Alcohol use: Not Currently     Comment: 0-1 drink every 3-4 months      Problem (# of Occurrences) Relation (Name,Age of Onset)    Cancer (1) Paternal Grandmother    Diabetes (1) Paternal Grandfather    Hypertension (1) Father    Coronary Artery Disease Early Onset (2) Father, Paternal Grandfather              Current Outpatient Medications   Medication Sig Dispense Refill    fluconazole (DIFLUCAN) 150 MG tablet Take 1 tablet (150 mg) by mouth daily. For 30 days then once a week for 5 months. 50 tablet 0    norethindrone-ethinyl estradiol-iron (MICROGESTIN FE1.5/30) 1.5-30 MG-MCG tablet Take 1 tablet by mouth daily. 84 tablet 3    nystatin (MYCOSTATIN) 206170 UNIT/GM external ointment Apply topically 2 times daily. 30 g 1     No current facility-administered medications for this visit.     Allergies   Allergen Reactions    Clindamycin Hives       ROS:  12 point review of systems negative other than symptoms noted below or in the HPI.  No urinary frequency or dysuria, bladder or kidney problems      OBJECTIVE:     /72   Wt 98.9 kg (218 lb)   BMI 36.84 kg/m    Body mass index is 36.84 kg/m .    Exam:  Constitutional:  Appearance: Well nourished, well developed alert, in no acute distress  Neurologic:  Mental Status:  Oriented X3.  Normal strength and tone, sensory exam grossly normal, mentation intact and speech normal.    Psychiatric:  Mentation appears normal and affect normal/bright.  Pelvic Exam:  External Genitalia:     Normal appearance for age, no discharge present, no tenderness present, no inflammatory lesions present, color normal  Vagina:     Normal vaginal vault without central or paravaginal defects, no discharge present, no inflammatory lesions present, no masses present  Bladder:     Nontender to palpation  Urethra:   Urethral Body:  Urethra palpation normal,  urethra structural support normal   Urethral Meatus:  No erythema or lesions present  Cervix:     Appearance healthy, no lesions present, nontender to palpation, no bleeding present  Uterus:     Uterus: firm, normal sized and nontender, anteverted in position.   Adnexa:     No adnexal tenderness present, no adnexal masses present  Perineum:     Perineum within normal limits, no evidence of trauma, no rashes or skin lesions present  Anus:     Anus within normal limits, no hemorrhoids present  Inguinal Lymph Nodes:     No lymphadenopathy present  Pubic Hair:     Normal pubic hair distribution for age  Genitalia and Groin:     No rashes present, no lesions present, no areas of discoloration, no masses present     In-Clinic Test Results:  No results found for this or any previous visit (from the past 24 hours).    ASSESSMENT/PLAN:                                                        ICD-10-CM    1. Chronic vaginitis  N76.1 Yeast culture     Multiplex Vaginal Panel by PCR          There are no Patient Instructions on file for this visit.    29 year old female here today for a follow up on recurrent yeast infection treatment. MVP and yeast culture collected. Pelvic exam normal, no signs of yeast. Will continue Diflucan per tx protocol. Talked about Candida Support use once Diflucan tx is done. Encouraged her to reach out with any yeast infection symptoms.     Tisha HEARD student    I was present with the student who participated in the service and in the documentation of the note. I have verified the history and personally performed the physical exam and medical decision-making. I agree with the assessment and plan of care as documented in the note.      ADALID Vargas CNP  [facility] [place]

## 2025-02-20 LAB — BACTERIA SPEC CULT: NORMAL

## 2025-05-01 NOTE — TELEPHONE ENCOUNTER
Daily Note      Today's date: 2025  Patient name: Ursula Gold  : 1960  MRN: [id number]  Referring provider: Luci Gilbert DO  Dx:   Encounter Diagnosis     ICD-10-CM    1. Left arm pain  M79.602             Subjective: Yolanda reports pain is gradually improving in LUE, no longer radiating down to hand and just more localized to shoulder at this time. Notes greatest discomfort with abduction to 90 degrees.          Objective: See treatment diary below    Assessment: Yolanda tolerated PT treatment well. Pt favoring STM to paraspinals/shoulder and SOR, decreased L shoulder pain following. Reviewed technique for medial nerve glides. Fatigue evident with TB RTC/periscapular strengthening. Decreased TB resistance with B/L ER d/t reports of posterior shoulder discomfort, resolved with adjustment. Pt would benefit from continued PT to further address impairments and maximize functional level.         Plan: Continue per plan of care.         Daily Treatment Diary     Precautions: Standard    POC Expires Reeval for Medicare to be completed  Unit Limit Auth Expiration Date PT/OT/STVisit Limit   25 By visit 10 N/A N/A N/A                       Auth Status DATE 3/27 4/17 5/1      NA Visit # 1 2 3       Remaining         MANUAL THERAPY         STM/MFR  Cervical paraspinals Cervical paraspinals + L shoulder       Manual SOR w/ cervical distraction    Performed JW      Joint mobs cervical                                     THERAPEUTIC EXERCISE HEP         Median nerve glide x 10x  10x LUE w/ R cervical sidebend  10x LUE w/ R cervical sidebend                                                                                                                                                   NEUROMUSCULAR REEDUCATION           Cervical retraction x 5x3s 2x10 (5s) seated 2x10 (5s) seated      Scap retraction  x 5x3s 2x10 (5s) seated 2x10 (5s) seated      Rows   2x10 GTB 2x10 GTB      Shoulder extension   2x10  SEE TODAY IN OFFICE:   * You need to be examined today. Let me give you an appointment.      Future Office Visit: Next 5 appointments (look out 90 days)    Feb 18, 2020  6:00 PM CST  SHORT with Liban Givens MD    [facility] ([facility]) [address] [place] [id number]  [phone]             Additional Information    Negative: Shock suspected (e.g., cold/pale/clammy skin, too weak to stand, low BP, rapid pulse)    Negative: Difficult to awaken or acting confused (e.g., disoriented, slurred speech)    Negative: Fainted, and still feels dizzy afterwards    Negative: Severe difficulty breathing (e.g., struggling for each breath, speaks in single words)    Negative: Overdose (accidental or intentional) of medications    Negative: New neurologic deficit that is present now: * Weakness of the face, arm, or leg on one side of the body * Numbness of the face, arm, or leg on one side of the body * Loss of speech or garbled speech    Negative: Heart beating < 50 beats per minute OR > 140 beats per minute    Negative: Sounds like a life-threatening emergency to the triager    Negative: Chest pain    Negative: Rectal bleeding, bloody stool, or tarry-black stool    Negative: Vomiting is the main symptom    Negative: Diarrhea is the main symptom    Negative: Headache is the main symptom    Negative: Heat exhaustion suspected (i.e., dehydration from heat exposure)    Negative: Patient states that he/she is having an anxiety/panic attack    Negative: SEVERE dizziness (e.g., unable to stand, requires support to walk, feels like passing out now)    Negative: Severe headache    Negative: Extra heart beats OR irregular heart beating (i.e., 'palpitations')    Negative: Difficulty breathing    Negative: Drinking very little and has signs of dehydration (e.g., no urine > 12 hours, very dry mouth, very lightheaded)    Negative: Follows bleeding (e.g., stomach, rectum, vagina)  "(Exception: became dizzy from sight of small amount blood)    Negative: Patient sounds very sick or weak to the triager    Negative: Lightheadedness (dizziness) present now, after 2 hours of rest and fluids    Negative: Spinning or tilting sensation (vertigo) present now    Negative: Fever > 103 F (39.4 C)    Negative: Fever > 100.0 F (37.8 C) and has diabetes mellitus or a weak immune system (e.g., HIV positive, cancer chemotherapy, organ transplant, splenectomy, chronic steroids)    Negative: Vomiting occurs with dizziness    Patient wants to be seen    Answer Assessment - Initial Assessment Questions  1. DESCRIPTION: \"Describe your dizziness.\"      Occurs in morning only    2. LIGHTHEADED: \"Do you feel lightheaded?\" (e.g., somewhat faint, woozy, weak upon standing)      Occasional     3. VERTIGO: \"Do you feel like either you or the room is spinning or tilting?\" (i.e. vertigo)      Yes room is spinning    4. SEVERITY: \"How bad is it?\"  \"Do you feel like you are going to faint?\" \"Can you stand and walk?\"    - MILD - walking normally    - MODERATE - interferes with normal activities (e.g., work, school)     - SEVERE - unable to stand, requires support to walk, feels like passing out now.       Not having dizziness right now    5. ONSET:  \"When did the dizziness begin?\"      Monday morning (2/17/2020)    6. AGGRAVATING FACTORS: \"Does anything make it worse?\" (e.g., standing, change in head position)      She said she did not move until it went it went away so not sure if anything makes it worse    7. HEART RATE: \"Can you tell me your heart rate?\" \"How many beats in 15 seconds?\"  (Note: not all patients can do this)        Heart rate now is 98    8. CAUSE: \"What do you think is causing the dizziness?\"      She said BP has been elevated the past few days, 156/94 and 152/96 or maybe stress due to work    9. RECURRENT SYMPTOM: \"Have you had dizziness before?\" If so, ask: \"When was the last time?\" \"What happened that " OTB 2x10 GTB      B/L ER   2x10 OTB  2x10                                                                                                 THERAPEUTIC ACTIVITY                                                    GAIT TRAINING                                                  MODALITIES                              Access Code: [email or web address]  URL: [email or web address]  Date: 04/17/2025  Prepared by: Hugo Clark    Exercises  - Seated Cervical Retraction  - 2 x daily - 7 x weekly - 2 sets - 10 reps - 5 hold  - Seated Scapular Retraction  - 2 x daily - 7 x weekly - 2 sets - 10 reps - 5 hold  - Standing Row with Anchored Resistance  - 1 x daily - 7 x weekly - 2 sets - 10 reps - 3 hold  - Shoulder Extension with Resistance  - 1 x daily - 7 x weekly - 2 sets - 10 reps - 3 hold  - Standing Shoulder External Rotation with Resistance  - 1 x daily - 7 x weekly - 2 sets - 10 reps - 3 hold  - Median Nerve Glide  - 1 x daily - 7 x weekly - 2 sets - 10 reps - 3 hold             "time?\"      She has never had dizziness before    10. OTHER SYMPTOMS: \"Do you have any other symptoms?\" (e.g., fever, chest pain, vomiting, diarrhea, bleeding)        Denies headache, chest pain or blurred vision. Dizziness is intermittent. Temp was 99.2 to 99.4.        Some acid reflux yesterday with nausea but no vomiting.  Denies runny nose, ear pain/fullness or congestion.          Denies pain in arm, neck or back    11. PREGNANCY: \"Is there any chance you are pregnant?\" \"When was your last menstrual period?\"        No    Protocols used: DIZZINESS-A-OH        She drinks quite a bit of water and fluids throughout the day. Denies blood in stool or constipation or any recent bleeding.      GASTON Bowens, RN, N  [facility]  Office: [phone]  Fax: [phone]    "

## 2025-06-11 ENCOUNTER — E-VISIT (OUTPATIENT)
Dept: URGENT CARE | Facility: CLINIC | Age: 30
End: 2025-06-11
Payer: COMMERCIAL

## 2025-06-11 DIAGNOSIS — J02.9 SORE THROAT: Primary | ICD-10-CM

## 2025-06-11 NOTE — PATIENT INSTRUCTIONS
Dear Kendra,    After reviewing your responses, I would like you to come in for a swab to make sure we treat you correctly. This swab is to evaluate you for possible Strep Throat, and should be scheduled for today or tomorrow. Please use the Schedule Now button in [facility] to schedule your swab. Otherwise, click this link to schedule a lab only appointment.    Lab appointments are not available at most locations on the weekends. If no Lab Only appointment is available, you should be seen in any of our convenient Urgent Care Centers for an in person visit, which can be found on our website here.    You will receive instructions with your results in [facility] once they are available.     If your symptoms worsen, you develop difficulty breathing, difficulty with drinking enough to stay hydrated, difficulty swallowing your saliva or have fevers for more than 5 days, please contact your primary care provider for an appointment or visit an Urgent Care Center to be seen.      Thanks again for choosing us as your health care partner.   ADALID DIXON CNP  Sore Throat: Care Instructions  Overview     Infection by bacteria or a virus causes most sore throats. Cigarette smoke, dry air, air pollution, allergies, and yelling can also cause a sore throat. Sore throats can be painful and annoying. Fortunately, most sore throats go away on their own. If you have a bacterial infection, your doctor may prescribe antibiotics.  Follow-up care is a key part of your treatment and safety. Be sure to make and go to all appointments, and call your doctor if you are having problems. It's also a good idea to know your test results and keep a list of the medicines you take.  How can you care for yourself at home?  If your doctor prescribed antibiotics, take them as directed. Do not stop taking them just because you feel better. You need to take the full course of antibiotics.  Gargle with warm salt water several times a day to help  "reduce swelling and relieve pain. Mix 1/2 teaspoon of salt in 1 cup of warm water.  Take an over-the-counter pain medicine, such as acetaminophen (Tylenol), ibuprofen (Advil, Motrin), or naproxen (Aleve). Read and follow all instructions on the label.  Be careful when taking over-the-counter cold or flu medicines and Tylenol at the same time. Many of these medicines have acetaminophen, which is Tylenol. Read the labels to make sure that you are not taking more than the recommended dose. Too much acetaminophen (Tylenol) can be harmful.  Drink plenty of fluids. Fluids may help soothe an irritated throat. Hot fluids, such as tea or soup, may help decrease throat pain.  Use over-the-counter throat lozenges to soothe pain. Regular cough drops or hard candy may also help. These should not be given to young children because of the risk of choking.  Do not smoke or allow others to smoke around you. If you need help quitting, talk to your doctor about stop-smoking programs and medicines. These can increase your chances of quitting for good.  Use a vaporizer or humidifier to add moisture to your bedroom. Follow the directions for cleaning the machine.  When should you call for help?   Call your doctor now or seek immediate medical care if:    You have trouble breathing.     Your sore throat gets much worse on one side.     You have new or worse trouble swallowing.     You have a new or higher fever.   Watch closely for changes in your health, and be sure to contact your doctor if you do not get better as expected.  Where can you learn more?  Go to https://www.[employer].net/patiented  Enter U420 in the search box to learn more about \"Sore Throat: Care Instructions.\"  Current as of: October 27, 2024  Content Version: 14.5    [id number] [employer] [employer].   Care instructions adapted under license by your healthcare professional. If you have questions about a medical condition or this instruction, always ask your healthcare " professional. [email or web address] [employer], [facility] disclaims any warranty or liability for your use of this information.

## 2025-06-30 ENCOUNTER — OFFICE VISIT (OUTPATIENT)
Dept: FAMILY MEDICINE | Facility: CLINIC | Age: 30
End: 2025-06-30
Payer: COMMERCIAL

## 2025-06-30 VITALS
WEIGHT: 215 LBS | DIASTOLIC BLOOD PRESSURE: 77 MMHG | HEIGHT: 65 IN | RESPIRATION RATE: 16 BRPM | BODY MASS INDEX: 35.82 KG/M2 | OXYGEN SATURATION: 100 % | HEART RATE: 95 BPM | SYSTOLIC BLOOD PRESSURE: 121 MMHG | TEMPERATURE: 98.9 F

## 2025-06-30 DIAGNOSIS — R23.3 EASY BRUISING: ICD-10-CM

## 2025-06-30 DIAGNOSIS — Z82.49 FAMILY HISTORY OF EARLY CAD: Primary | ICD-10-CM

## 2025-06-30 DIAGNOSIS — Z82.49 FAMILY HISTORY OF BLOOD CLOTS: ICD-10-CM

## 2025-06-30 LAB
ERYTHROCYTE [DISTWIDTH] IN BLOOD BY AUTOMATED COUNT: 11.8 % (ref 10–15)
HCT VFR BLD AUTO: 40.3 % (ref 35–47)
HGB BLD-MCNC: 13.8 G/DL (ref 11.7–15.7)
INR PPP: 0.96 (ref 0.85–1.15)
MCH RBC QN AUTO: 30 PG (ref 26.5–33)
MCHC RBC AUTO-ENTMCNC: 34.2 G/DL (ref 31.5–36.5)
MCV RBC AUTO: 88 FL (ref 78–100)
PLATELET # BLD AUTO: 306 10E3/UL (ref 150–450)
PROTHROMBIN TIME: 13.2 SECONDS (ref 11.8–14.8)
RBC # BLD AUTO: 4.6 10E6/UL (ref 3.8–5.2)
WBC # BLD AUTO: 7.3 10E3/UL (ref 4–11)

## 2025-06-30 PROCEDURE — 36415 COLL VENOUS BLD VENIPUNCTURE: CPT | Performed by: NURSE PRACTITIONER

## 2025-06-30 PROCEDURE — 85027 COMPLETE CBC AUTOMATED: CPT | Performed by: NURSE PRACTITIONER

## 2025-06-30 PROCEDURE — 85730 THROMBOPLASTIN TIME PARTIAL: CPT | Performed by: NURSE PRACTITIONER

## 2025-06-30 PROCEDURE — 85610 PROTHROMBIN TIME: CPT | Performed by: NURSE PRACTITIONER

## 2025-06-30 NOTE — PROGRESS NOTES
Called to schedule doppler ultrasound    No openings today at [facility] or [facility]  Have to call back tomorrow for same days     Wednesday @ [facility] open times 1pm, 130pm or 220pm    405pm [facility]  July 3rd    Per patient 1pm on Wednesday works - scheduled.   No special instructions or prep for patient     Future Appointments 6/30/2025 - 12/27/2025        Date Visit Type Length Department Provider     7/2/2025  1:00 PM US LWR EXT VENOUS DUPLEX BILAT 40 min [facility] ULTRASOUND [facility] US4    Location Instructions:     We are located at [address] [facility] in [place]. To find the Radiology department enter through the main entrance. The information desk will be on your right. Follow the hallway to the elevators on the left, take these elevators to the Ground (Turtle) Level. Exit the elevator to the left and proceed down the hallway with glass windows, and this will bring you to the main radiology desk where you will check in.  This appointment is in a hospital-based location.&nbsp; Before your visit, you may want to check with your insurance company for coverage and referral options, including cost differences between services provided in different clinic settings.&nbsp; For more information visit this link on the [facility] Website:&nbsp; [email or web address]

## 2025-06-30 NOTE — PROGRESS NOTES
"  Assessment & Plan     Family history of early CAD  ***    Family history of blood clots  ***    Easy bruising  ***            BMI  Estimated body mass index is 36.33 kg/m  as calculated from the following:    Height as of this encounter: 1.638 m (5' 4.5\").    Weight as of this encounter: 97.5 kg (215 lb).   Weight management plan: Discussed healthy diet and exercise guidelines      No follow-ups on file.     Subjective   Kendra is a 29 year old, presenting for the following health issues:  Musculoskeletal Problem        6/30/2025     2:20 PM   Additional Questions   Roomed by Arely GOODWIN   Accompanied by Self     History of Present Illness       Reason for visit:  Leg lumps/pain  Symptom onset:  1-2 weeks ago  Symptom intensity:  Moderate  Symptom progression:  Staying the same  Had these symptoms before:  No  What makes it worse:  Walking/exercise   She is taking medications regularly.      X8 days - 1 on each leg - lower legs inner. Feels like a bruised when touched. 9/10 pain when kids are playing and push on the area.  Cannot see but an feel.  Pain when walking - worse the longer stands or walks -- dull when sitting.  Begum have a very seditary job, off for summer. Has been going to gym and walking dog.     Dad had heart attack at 45 year old and is prone to getting blood clots.  Grandpa and 65 years old.       Review of Systems  Constitutional, neuro, ENT, endocrine, pulmonary, cardiac, gastrointestinal, genitourinary, musculoskeletal, integument and psychiatric systems are negative, except as otherwise noted.      Objective    /77 (BP Location: Left arm, Patient Position: Chair, Cuff Size: Adult Regular)   Pulse 95   Temp 98.9  F (37.2  C) (Tympanic)   Resp 16   Ht 1.638 m (5' 4.5\")   Wt 97.5 kg (215 lb)   LMP 06/05/2025 (Exact Date)   SpO2 100%   BMI 36.33 kg/m    Body mass index is 36.33 kg/m .  Physical Exam   {FEMALE - complete :[id number]}       Signed Electronically by: ADALID Moreira " CNP

## 2025-07-01 ENCOUNTER — MYC REFILL (OUTPATIENT)
Dept: OBGYN | Facility: CLINIC | Age: 30
End: 2025-07-01
Payer: COMMERCIAL

## 2025-07-01 ENCOUNTER — RESULTS FOLLOW-UP (OUTPATIENT)
Dept: FAMILY MEDICINE | Facility: CLINIC | Age: 30
End: 2025-07-01

## 2025-07-01 DIAGNOSIS — Z30.41 ENCOUNTER FOR SURVEILLANCE OF CONTRACEPTIVE PILLS: ICD-10-CM

## 2025-07-01 LAB — APTT PPP: 26 SECONDS (ref 22–38)

## 2025-07-01 RX ORDER — NORETHINDRONE ACETATE AND ETHINYL ESTRADIOL 1.5-30(21)
1 KIT ORAL DAILY
Qty: 84 TABLET | Refills: 0 | Status: SHIPPED | OUTPATIENT
Start: 2025-07-01

## 2025-07-01 NOTE — TELEPHONE ENCOUNTER
Requested Prescriptions   Pending Prescriptions Disp Refills    norethindrone-ethinyl estradiol-iron (MICROGESTIN FE1.5/30) 1.5-30 MG-MCG tablet 84 tablet 0     Sig: Take 1 tablet by mouth daily.       Contraceptives Protocol Passed - 7/1/2025  8:08 AM        Passed - Patient is not a current smoker if age is 35 or older        Passed - Medication is active on med list and the sig matches. RN to manually verify dose and sig if red X/fail.     If the protocol passes (green check), you do not need to verify med dose and sig.    A prescription matches if they are the same clinical intention.    For Example: once daily and every morning are the same.    The protocol can not identify upper and lower case letters as matching and will fail.     For Example: Take 1 tablet (50 mg) by mouth daily     TAKE 1 TABLET (50 MG) BY MOUTH DAILY    For all fails (red x), verify dose and sig.    If the refill does match what is on file, the RN can still proceed to approve the refill request.       If they do not match, route to the appropriate provider.             Passed - Recent (12 month) or future (90 days) visit with authorizing provider's specialty (provided they have been seen in the past 15 months)     The patient must have completed an in-person or virtual visit within the past 12 months or has a future visit scheduled within the next 90 days with the authorizing provider s specialty.  Urgent care and e-visits do not qualify as an office visit for this protocol.          Passed - Medication indicated for associated diagnosis     Medication is associated with one or more of the following diagnoses:  Contraception  Acne  Dysmenorrhea  Menorrhagia  Amenorrhea  PCOS  Premenstrual Dysphoric Disorder  Irregular menses  Endometriosis  Contraceptive counseling  Finding of menstrual bleeding  Education about oral contraception  Uses contraception  Initial prescription of oral contraception  Oral contraception-no problem  Oral  contraceptive repeat          Passed - No active pregnancy on record        Passed - No positive pregnancy test in past 12 months           Last Written Prescription Date:  8/21/24  Last Fill Quantity: 84,  # refills: 3   Last office visit: 2/17/2025 ; last virtual visit: Visit date not found with prescribing provider:  Mely Beckman NP   Future Office Visit:  8/28/2025 w Mely Beckman NP    Prescription approved per [facility] Refill Protocol.  Mely Cazares RN on 7/1/2025 at 8:09 AM

## 2025-07-02 ENCOUNTER — TELEPHONE (OUTPATIENT)
Dept: FAMILY MEDICINE | Facility: CLINIC | Age: 30
End: 2025-07-02
Payer: COMMERCIAL

## 2025-07-02 ENCOUNTER — HOSPITAL ENCOUNTER (OUTPATIENT)
Dept: ULTRASOUND IMAGING | Facility: CLINIC | Age: 30
Discharge: HOME | End: 2025-07-02
Attending: NURSE PRACTITIONER
Payer: COMMERCIAL

## 2025-07-02 ENCOUNTER — MYC MEDICAL ADVICE (OUTPATIENT)
Dept: FAMILY MEDICINE | Facility: CLINIC | Age: 30
End: 2025-07-02
Payer: COMMERCIAL

## 2025-07-02 DIAGNOSIS — R23.3 EASY BRUISING: ICD-10-CM

## 2025-07-02 DIAGNOSIS — Z82.49 FAMILY HISTORY OF BLOOD CLOTS: ICD-10-CM

## 2025-07-02 DIAGNOSIS — I80.01 THROMBOPHLEBITIS OF SUPERFICIAL VEINS OF RIGHT LOWER EXTREMITY: Primary | ICD-10-CM

## 2025-07-02 LAB — RADIOLOGIST FLAGS: ABNORMAL

## 2025-07-02 PROCEDURE — 93970 EXTREMITY STUDY: CPT

## 2025-07-02 NOTE — TELEPHONE ENCOUNTER
"I am reviewing her current ultrasound of her legs the right ankle area does have a superficial thrombophlebitis which means it is a little clot in the superficial vein.  The left leg is normal.    Based on up-to-date guidelines below I offer the following advise.  From her ultrasound report it is near her ankle not near the back of her knee or her groin.  The size is read as \"small\" so I do not think it is greater than 5 cm's.   We will monitor we will repeat an ultrasound next week.  I will also have her start daily regular strength 325 aspirin and apply heat to the area to help with absorption  If she has worsening extension she would be considered needing prophylactic anticoagulation.   It might be worth further workup for clotting studies if worsening or she gets another clot of any kind.   I can certainly refer her to our bleeding and clotting team.  Ultrasound of her right leg is ordered for next week.  Also review of her chart shows me she is on Microgestin; I believe when I saw her on Monday I thought she was on Micronor which is what she has been on in the past.   This is some she has been on for a while so not sure if she wants to discuss with her OB/GYN at this time to see if they want to pull her off of the estrogen while we are sorting out the clot scenario.    Healthy regards,            Michaelle Arita, ULIP-BC         "

## 2025-07-02 NOTE — TELEPHONE ENCOUNTER
Called Pt and advised of below. She reports understanding and agrees with plan. Will send number to schedule US next week via [employer] per Pt request. She will contact OB re: Microgestin tomorrow.

## 2025-07-02 NOTE — TELEPHONE ENCOUNTER
DATE/TIME OF CALL RECEIVED FROM LAB:  07/02/25 at 3:14 PM   LAB TEST:  LE US  LAB VALUE:  2. Small short area of superficial thrombophlebitis greater saphenous vein near the ankle may explain the patient's symptoms.   PROVIDER NOTIFIED?: Yes  PROVIDER NAME: Michaelle Arita  DATE/TIME LAB VALUE REPORTED TO PROVIDER: 07/02/25  3:15 PM   MECHANISM OF PROVIDER NOTIFICATION: Face-To-Face  PROVIDER RESPONSE:

## 2025-07-03 ENCOUNTER — TELEPHONE (OUTPATIENT)
Dept: OBGYN | Facility: CLINIC | Age: 30
End: 2025-07-03
Payer: COMMERCIAL

## 2025-07-03 DIAGNOSIS — Z30.41 ENCOUNTER FOR SURVEILLANCE OF CONTRACEPTIVE PILLS: Primary | ICD-10-CM

## 2025-07-03 DIAGNOSIS — Z86.72 PERSONAL HISTORY OF THROMBOPHLEBITIS: ICD-10-CM

## 2025-07-03 RX ORDER — ACETAMINOPHEN AND CODEINE PHOSPHATE 120; 12 MG/5ML; MG/5ML
0.35 SOLUTION ORAL DAILY
Qty: 84 TABLET | Refills: 4 | Status: SHIPPED | OUTPATIENT
Start: 2025-07-03

## 2025-07-03 NOTE — TELEPHONE ENCOUNTER
Currently has a superficial blood clot have her reach out to OBGYN about her current birth control to see if they want to change her birth control to progestogen only.     Healthy regards,            Michaelle Arita, FNP-BC

## 2025-07-03 NOTE — TELEPHONE ENCOUNTER
Kendra on OCP. Just diagnosed with superficial thrombophlebitis. Called and spoke with patient. Recommend changing to POP.   Patient on board with plan.    Sent in POP.    Viji Nunez PA-C

## 2025-07-03 NOTE — TELEPHONE ENCOUNTER
[facility] Call Center    Phone Message    May a detailed message be left on voicemail: yes     Reason for Call: Other: . Pt had an US done yesterday and they found a blood clot in her leg, pt is wondering what Mely would recommend doing with her current birth control method, please call Kendra back, and OK for VM, thank you!    Action Taken: Message routed to:  Other: OBGYN    Travel Screening: Not Applicable     Date of Service:

## 2025-07-03 NOTE — TELEPHONE ENCOUNTER
Pt wondering about OCP?    norethindrone-ethinyl estradiol-iron (MICROGESTIN FE1.5/30) 1.5-30 MG-MCG tablet       States was diagnosed with blood clot in leg    7/2/25:   IMPRESSION:     1. Negative for DVT.     2. Small short area of superficial thrombophlebitis greater saphenous vein near the ankle may explain the patient's symptoms.    Routing pt mychart message to provider to advise.  Sariah Singh RN on 7/3/2025 at 9:02 AM   [facility]

## 2025-07-03 NOTE — TELEPHONE ENCOUNTER
LM for pt to reach out to her Obgyn about the birth control.  She can call us (gave #) if there is anything further we need to do or if she has any further questions or concerns.

## 2025-07-06 ENCOUNTER — MYC MEDICAL ADVICE (OUTPATIENT)
Dept: FAMILY MEDICINE | Facility: CLINIC | Age: 30
End: 2025-07-06
Payer: COMMERCIAL

## 2025-07-06 DIAGNOSIS — R22.42 LOWER LEG MASS, LEFT: Primary | ICD-10-CM

## 2025-07-09 ENCOUNTER — TELEPHONE (OUTPATIENT)
Dept: FAMILY MEDICINE | Facility: CLINIC | Age: 30
End: 2025-07-09
Payer: COMMERCIAL

## 2025-07-09 ENCOUNTER — HOSPITAL ENCOUNTER (OUTPATIENT)
Dept: ULTRASOUND IMAGING | Facility: CLINIC | Age: 30
Discharge: HOME | End: 2025-07-09
Attending: NURSE PRACTITIONER
Payer: COMMERCIAL

## 2025-07-09 DIAGNOSIS — I80.01 THROMBOPHLEBITIS OF SUPERFICIAL VEINS OF RIGHT LOWER EXTREMITY: ICD-10-CM

## 2025-07-09 PROCEDURE — 93970 EXTREMITY STUDY: CPT

## 2025-07-09 NOTE — TELEPHONE ENCOUNTER
Situation   Calling regarding bilateral lower extremity ultrasound result from today 7/9     Background   7/2 US-1. Negative for DVT.     2. Small short area of superficial thrombophlebitis greater saphenous vein near the ankle may explain the patient's symptoms.  Patient states she was told to take 325mg of asa daily for one week    Assessment   Patient asks if she can fly next week?     She is going to keep 325 mg of asa daily until she hears back.     Patient asks for next steps about the lump on her left ankle that is  - has been present for 2 weeks.   LEFT: No deep vein thrombosis. No superficial thrombophlebitis. No popliteal cyst. Under the area of pain in the left ankle there is no abnormality.     Recommendations   Explained will send to covering provider and call her back.    Missing several rear molars, premolars, teeth. Poor dentition with several chipped teeth, ground/worn teeth, cavities./loose teeth/missing teeth

## 2025-07-09 NOTE — TELEPHONE ENCOUNTER
US Lower Extremity Venous Duplex Right - was ordered and scheduled for today   Rn will place order as directed below - routing to provider to sign and fill in the questions     Thank you     Tatum Abraham RN, BSN  [facility]

## 2025-07-10 NOTE — TELEPHONE ENCOUNTER
7/9/2025 venous doppler    RIGHT: No deep vein thrombosis. Stable short segment thrombus in the great saphenous vein at the ankle, measuring about 5 cm in length. The remainder of the great saphenous vein in the calf, knee and thigh is patent. No popliteal cyst.     LEFT: No deep vein thrombosis. No superficial thrombophlebitis. No popliteal cyst. Under the area of pain in the left ankle there is no abnormality.                                                       IMPRESSION:  1.  No deep venous thrombosis in the bilateral lower extremities.  2.  Stable short segment superficial thrombophlebitis in the right great saphenous vein at the ankle.    Please check how long the flight will be?

## 2025-07-10 NOTE — TELEPHONE ENCOUNTER
Flight is to [place] just shy of 2 hours. Huddled with covering provider Dr. Navdeep Shahid. Not overly concerned with flight less than 2 hours, low likely [place] of any issues. Continue ASA. Recommends heel/calf pumps and getting up during flight. Heating pad if able. Called and advised Pt.

## 2025-07-11 ENCOUNTER — TELEPHONE (OUTPATIENT)
Dept: FAMILY MEDICINE | Facility: CLINIC | Age: 30
End: 2025-07-11
Payer: COMMERCIAL

## 2025-07-11 DIAGNOSIS — I80.01 THROMBOPHLEBITIS OF SUPERFICIAL VEINS OF RIGHT LOWER EXTREMITY: Primary | ICD-10-CM

## 2025-07-11 NOTE — TELEPHONE ENCOUNTER
Spoke with pt and advised of information below. Pt will await call back to advise on what Michaelle's response is going to be.    Dede CHRISTINE, RN, PHN

## 2025-07-11 NOTE — TELEPHONE ENCOUNTER
Attempt #1  Called Phone # [phone]      Left a non detailed voicemail to please call back and ask for any available triage nurse @ [phone].       Was going to talk with patient covering provider ordered another ultrasound of the left leg due to the lump/tender area. Can wait to schedule if she would like so Michaelle Escalanteon can make further recommendations when in on Monday.  Writer will send this back to Dr. Shahid to see another ultrasound is needed?     Continue asa as directed until Michaelle Arita can weigh in    Michaelle Arita will be back in office   7/9/25 Ultrasound   LEFT: No deep vein thrombosis. No superficial thrombophlebitis. No popliteal cyst. Under the area of pain in the left ankle there is no abnormality.

## 2025-07-11 NOTE — TELEPHONE ENCOUNTER
Reviewed with Bceki LIMA on 7/10/2025    Advise continue on  mg daily until at least follow up with A Juan J in the next few weeks    Travel cares reviewed as well    US    RIGHT: No deep vein thrombosis. Stable short segment thrombus in the great saphenous vein at the ankle, measuring about 5 cm in length. The remainder of the great saphenous vein in the calf, knee and thigh is patent. No popliteal cyst.     LEFT: No deep vein thrombosis. No superficial thrombophlebitis. No popliteal cyst. Under the area of pain in the left ankle there is no abnormality.                                                                      IMPRESSION:  1.  No deep venous thrombosis in the bilateral lower extremities.  2.  Stable short segment superficial thrombophlebitis in the right great saphenous vein at the ankle.

## 2025-07-11 NOTE — TELEPHONE ENCOUNTER
Patient calling stating she just missed a call from radiology to schedule her leg ultrasound.  Patient just has both legs on 7/9/25.  Is she needing another ultrasound?    Patient also wondering how long she should be taking the ASA.  Was told in TE yesterday to just continue the ASA.  Patient wondering if she needs to make a follow up appointment. Patient requesting a bit more direction.  Please advise, thanks.      See TE from 7/9/25.    Myc message with provider response.

## 2025-07-14 NOTE — TELEPHONE ENCOUNTER
Called #   Telephone Information:   Mobile [phone]     Advised pt on the information below     Patient stated an understanding and agreed with plan.      Tatum Abraham RN, BSN  [facility]

## 2025-07-14 NOTE — TELEPHONE ENCOUNTER
Stable clot. Continue daily ASA.   Start Micronor per OBGYN which I 100% agree with.   Lets recheck an US of her right ankle in 2 months.   If her left ankle is still bugging her I would recommend ortho walkin for consult.     Orthopedic walk in [facility]   [address] [place]    Hours Monday through Thursday 8 am-6 pm  Friday 8 am-5 pm  Saturday 8 am- 12 pm      Healthy regards,            Michaelle Arita, FNP-BC

## 2025-07-14 NOTE — PATIENT INSTRUCTIONS
Based on our discussion, I have outlined the following instructions for you:      - Think about scheduling a preventative appointment with a heart specialist to help prevent heart problems.  - You have an ultrasound appointment on Wednesday to check for any clots in your veins and to look at any bruising under the skin.  - Routine blood tests will be done.  - If your symptoms get worse or look like they might be infected, please be seen,    Thank you again for your visit, and we look forward to supporting you in your journey to better health.

## 2025-07-22 ENCOUNTER — PATIENT OUTREACH (OUTPATIENT)
Dept: CARE COORDINATION | Facility: CLINIC | Age: 30
End: 2025-07-22
Payer: COMMERCIAL

## 2025-08-05 ENCOUNTER — PATIENT OUTREACH (OUTPATIENT)
Dept: CARE COORDINATION | Facility: CLINIC | Age: 30
End: 2025-08-05
Payer: COMMERCIAL

## 2025-09-03 ENCOUNTER — MYC MEDICAL ADVICE (OUTPATIENT)
Dept: FAMILY MEDICINE | Facility: CLINIC | Age: 30
End: 2025-09-03

## 2025-09-03 ENCOUNTER — OFFICE VISIT (OUTPATIENT)
Dept: OBGYN | Facility: CLINIC | Age: 30
End: 2025-09-03
Payer: COMMERCIAL

## 2025-09-03 VITALS
HEIGHT: 65 IN | BODY MASS INDEX: 36.92 KG/M2 | DIASTOLIC BLOOD PRESSURE: 76 MMHG | SYSTOLIC BLOOD PRESSURE: 124 MMHG | WEIGHT: 221.6 LBS

## 2025-09-03 DIAGNOSIS — I80.01 THROMBOPHLEBITIS OF SUPERFICIAL VEINS OF RIGHT LOWER EXTREMITY: Primary | ICD-10-CM

## 2025-09-03 DIAGNOSIS — F41.9 ANXIETY: ICD-10-CM

## 2025-09-03 DIAGNOSIS — Z01.419 ENCOUNTER FOR GYNECOLOGICAL EXAMINATION WITHOUT ABNORMAL FINDING: Primary | ICD-10-CM

## 2025-09-03 DIAGNOSIS — N93.9 ABNORMAL UTERINE BLEEDING (AUB): ICD-10-CM

## 2025-09-03 PROCEDURE — 36415 COLL VENOUS BLD VENIPUNCTURE: CPT | Performed by: NURSE PRACTITIONER

## 2025-09-03 RX ORDER — SERTRALINE HYDROCHLORIDE 25 MG/1
25 TABLET, FILM COATED ORAL DAILY
Qty: 90 TABLET | Refills: 3 | Status: SHIPPED | OUTPATIENT
Start: 2025-09-03

## 2025-09-03 RX ORDER — SENNOSIDES 8.6 MG
325 CAPSULE ORAL DAILY
COMMUNITY

## 2025-09-03 RX ORDER — ACETAMINOPHEN AND CODEINE PHOSPHATE 120; 12 MG/5ML; MG/5ML
0.35 SOLUTION ORAL DAILY
Qty: 84 TABLET | Refills: 4 | Status: CANCELLED | OUTPATIENT
Start: 2025-09-03

## 2025-09-03 ASSESSMENT — PATIENT HEALTH QUESTIONNAIRE - PHQ9
5. POOR APPETITE OR OVEREATING: SEVERAL DAYS
SUM OF ALL RESPONSES TO PHQ QUESTIONS 1-9: 6

## 2025-09-03 ASSESSMENT — ANXIETY QUESTIONNAIRES
IF YOU CHECKED OFF ANY PROBLEMS ON THIS QUESTIONNAIRE, HOW DIFFICULT HAVE THESE PROBLEMS MADE IT FOR YOU TO DO YOUR WORK, TAKE CARE OF THINGS AT HOME, OR GET ALONG WITH OTHER PEOPLE: VERY DIFFICULT
5. BEING SO RESTLESS THAT IT IS HARD TO SIT STILL: SEVERAL DAYS
7. FEELING AFRAID AS IF SOMETHING AWFUL MIGHT HAPPEN: NEARLY EVERY DAY
GAD7 TOTAL SCORE: 14
2. NOT BEING ABLE TO STOP OR CONTROL WORRYING: MORE THAN HALF THE DAYS
3. WORRYING TOO MUCH ABOUT DIFFERENT THINGS: MORE THAN HALF THE DAYS
GAD7 TOTAL SCORE: 14
1. FEELING NERVOUS, ANXIOUS, OR ON EDGE: MORE THAN HALF THE DAYS
6. BECOMING EASILY ANNOYED OR IRRITABLE: NEARLY EVERY DAY

## 2025-09-04 LAB
HCG INTACT+B SERPL-ACNC: <1 MIU/ML
HPV HR 12 DNA CVX QL NAA+PROBE: NEGATIVE
HPV16 DNA CVX QL NAA+PROBE: NEGATIVE
HPV18 DNA CVX QL NAA+PROBE: NEGATIVE
HUMAN PAPILLOMA VIRUS FINAL DIAGNOSIS: NORMAL

## (undated) DEVICE — DRAPE SHEET REV FOLD 3/4 9349

## (undated) DEVICE — LINEN TOWEL PACK X5 5464

## (undated) DEVICE — SOL NACL 0.9% IRRIG 1000ML BOTTLE 07138-09

## (undated) DEVICE — STPL SKIN PROXIMATE 35 WIDE PMW35

## (undated) DEVICE — DRSG STERI STRIP 1/2X4" B1557

## (undated) DEVICE — BLADE CLIPPER 4406

## (undated) DEVICE — LIGHT HANDLE X2

## (undated) DEVICE — DRSG KERLIX FLUFFS X5

## (undated) DEVICE — ESU GROUND PAD UNIVERSAL W/O CORD

## (undated) DEVICE — DRSG ABDOMINAL 07 1/2X8" 7197D

## (undated) DEVICE — SURGICEL POWDER ABSORBABLE HEMOSTAT 3GM 3013SP

## (undated) DEVICE — PACK SET-UP STD 9102

## (undated) DEVICE — PACK C-SECTION LF PL15OTA83B

## (undated) DEVICE — LINEN BABY BLANKET 5434

## (undated) DEVICE — PAD CHUX UNDERPAD 23X24" 7136

## (undated) DEVICE — PREP CHLORAPREP 26ML TINTED ORANGE  260815

## (undated) DEVICE — SUCTION CANISTER MEDIVAC LINER 3000ML W/LID 65651-530

## (undated) DEVICE — SOL WATER IRRIG 1000ML BOTTLE 07139-09

## (undated) RX ORDER — KETOROLAC TROMETHAMINE 30 MG/ML
INJECTION, SOLUTION INTRAMUSCULAR; INTRAVENOUS
Status: DISPENSED
Start: 2023-07-13

## (undated) RX ORDER — LIDOCAINE HCL/EPINEPHRINE/PF 2%-1:200K
VIAL (ML) INJECTION
Status: DISPENSED
Start: 2021-09-07

## (undated) RX ORDER — ONDANSETRON 2 MG/ML
INJECTION INTRAMUSCULAR; INTRAVENOUS
Status: DISPENSED
Start: 2021-09-07

## (undated) RX ORDER — KETOROLAC TROMETHAMINE 30 MG/ML
INJECTION, SOLUTION INTRAMUSCULAR; INTRAVENOUS
Status: DISPENSED
Start: 2021-09-07

## (undated) RX ORDER — FENTANYL CITRATE 50 UG/ML
INJECTION, SOLUTION INTRAMUSCULAR; INTRAVENOUS
Status: DISPENSED
Start: 2021-09-07

## (undated) RX ORDER — ONDANSETRON 2 MG/ML
INJECTION INTRAMUSCULAR; INTRAVENOUS
Status: DISPENSED
Start: 2023-07-13

## (undated) RX ORDER — MORPHINE SULFATE 1 MG/ML
INJECTION, SOLUTION EPIDURAL; INTRATHECAL; INTRAVENOUS
Status: DISPENSED
Start: 2021-09-07

## (undated) RX ORDER — MORPHINE SULFATE 1 MG/ML
INJECTION, SOLUTION EPIDURAL; INTRATHECAL; INTRAVENOUS
Status: DISPENSED
Start: 2023-07-13

## (undated) RX ORDER — OXYTOCIN/0.9 % SODIUM CHLORIDE 30/500 ML
PLASTIC BAG, INJECTION (ML) INTRAVENOUS
Status: DISPENSED
Start: 2021-09-07